# Patient Record
Sex: FEMALE | Race: WHITE | NOT HISPANIC OR LATINO | Employment: OTHER | URBAN - NONMETROPOLITAN AREA
[De-identification: names, ages, dates, MRNs, and addresses within clinical notes are randomized per-mention and may not be internally consistent; named-entity substitution may affect disease eponyms.]

---

## 2017-03-01 ENCOUNTER — OFFICE VISIT (OUTPATIENT)
Dept: ORTHOPEDIC SURGERY | Facility: CLINIC | Age: 67
End: 2017-03-01

## 2017-03-01 VITALS — WEIGHT: 158 LBS | BODY MASS INDEX: 26.33 KG/M2 | RESPIRATION RATE: 18 BRPM | HEIGHT: 65 IN

## 2017-03-01 DIAGNOSIS — M25.561 RIGHT KNEE PAIN, UNSPECIFIED CHRONICITY: Primary | ICD-10-CM

## 2017-03-01 DIAGNOSIS — M17.11 PRIMARY OSTEOARTHRITIS OF RIGHT KNEE: ICD-10-CM

## 2017-03-01 PROCEDURE — 73560 X-RAY EXAM OF KNEE 1 OR 2: CPT | Performed by: ORTHOPAEDIC SURGERY

## 2017-03-01 PROCEDURE — 99202 OFFICE O/P NEW SF 15 MIN: CPT | Performed by: ORTHOPAEDIC SURGERY

## 2017-03-01 RX ORDER — LISINOPRIL 10 MG/1
10 TABLET ORAL DAILY
COMMUNITY
Start: 2017-02-13 | End: 2018-01-22

## 2017-03-01 RX ORDER — HYDROCODONE BITARTRATE AND ACETAMINOPHEN 7.5; 325 MG/1; MG/1
TABLET ORAL
COMMUNITY
Start: 2017-02-16 | End: 2017-07-25

## 2017-03-01 RX ORDER — SIMVASTATIN 40 MG
40 TABLET ORAL NIGHTLY
COMMUNITY
Start: 2017-02-10 | End: 2018-08-01

## 2017-03-01 RX ORDER — CHLORHEXIDINE GLUCONATE 0.12 MG/ML
RINSE ORAL
COMMUNITY
Start: 2017-02-16 | End: 2017-07-25

## 2017-03-01 RX ORDER — TACROLIMUS 1 MG/1
CAPSULE ORAL
Refills: 2 | COMMUNITY
Start: 2017-01-26 | End: 2017-07-25

## 2017-03-01 NOTE — PROGRESS NOTES
Subjective   Patient ID: Sahara Hardwick is a 66 y.o. right hand dominant female is being seen for orthopaedic evaluation today for right knee pain  Pain of the Right Knee    Lower Extremity Issue   This is a chronic problem. The current episode started more than 1 month ago (January, 2017 getting up from a recliner noted increased right knee pain.  It has gradually subsided since that time. No acute injrury or trauma.  ). The problem occurs every several days. The problem has been gradually improving. Associated symptoms include arthralgias. Pertinent negatives include no abdominal pain, chest pain, fever, joint swelling or rash. The symptoms are aggravated by walking and stress. She has tried rest and ice (sitting down, brace, massage, physical therapy) for the symptoms.        Pain Score: 4  Pain Location: Knee  Pain Orientation: Right     Pain Descriptors: Stabbing  Pain Frequency: Several days a week  Pain Onset: Sudden  Date Pain First Started: 01/01/17     Aggravating Factors: Walking, Standing        Pain Intervention(s): Rest, Heat applied, Cold applied, Other (Comment), Massage (brace, physical therapy)  Result of Injury: Yes (got up from recliner)  Work-Related Injury: No    Past Medical History   Diagnosis Date   • Arthritis    • Diverticulitis    • GERD (gastroesophageal reflux disease)    • High cholesterol    • Hypertension         Past Surgical History   Procedure Laterality Date   • Hysterectomy  1991   • Gallbladder surgery  1991   • Hand surgery  2015     right middle finger arthritis nodule removed.       Family History   Problem Relation Age of Onset   • Osteoarthritis Mother    • Heart block Mother    • Cancer Mother      breast   • Heart block Father    • Diabetes Father    • Hyperlipidemia Father    • Cancer Father      colon   • Coronary artery disease Other         Social History     Social History   • Marital status:      Spouse name: N/A   • Number of children: N/A   • Years of  "education: N/A     Occupational History   • retired      Social History Main Topics   • Smoking status: Never Smoker   • Smokeless tobacco: Not on file   • Alcohol use No   • Drug use: No   • Sexual activity: Defer     Other Topics Concern   • Not on file     Social History Narrative       Allergies   Allergen Reactions   • Latex Rash       Review of Systems   Constitutional: Negative for fever.   HENT: Negative for voice change.    Eyes: Negative for visual disturbance.   Respiratory: Negative for shortness of breath.    Cardiovascular: Negative for chest pain.   Gastrointestinal: Negative for abdominal distention and abdominal pain.   Genitourinary: Negative for dysuria.   Musculoskeletal: Positive for arthralgias. Negative for gait problem and joint swelling.   Skin: Negative for rash.   Neurological: Negative for speech difficulty.   Hematological: Does not bruise/bleed easily.   Psychiatric/Behavioral: Negative for confusion.       Objective   Visit Vitals   • Resp 18   • Ht 65\" (165.1 cm)   • Wt 158 lb (71.7 kg)   • BMI 26.29 kg/m2      Physical Exam   Constitutional: She appears well-developed. No distress.   Musculoskeletal: She exhibits deformity (varus right knee).        Right knee: She exhibits no effusion.   Neurological: She is alert.   Skin: Skin is warm and dry. No rash noted. No erythema.   Psychiatric: She has a normal mood and affect. Her speech is normal.   Vitals reviewed.    Right Knee Exam     Tenderness   The patient is experiencing tenderness in the patella and medial retinaculum.    Range of Motion   Extension: 0   Flexion: 110     Muscle Strength     The patient has normal right knee strength.    Tests   Joseph:  Medial - negative   Drawer:       Anterior - 1+    Posterior - 1+  Varus: positive (pseudolaxity)  Valgus: negative  Patellar Apprehension: negative    Other   Erythema: absent  Sensation: normal  Pulse: present  Swelling: mild  Other tests: no effusion present      Back Exam "     Muscle Strength   Right Quadriceps:  5/5   Left Quadriceps:  5/5   Right Hamstrings:  5/5   Left Hamstrings:  5/5         Extremity DVT signs are Negative on physical exam with negative Castillo sign, with no calf pain and with no palpable cords   Neurologic Exam     Mental Status   Attention: normal.   Speech: speech is normal   Level of consciousness: alert  Knowledge: good.     Motor Exam   Overall muscle tone: normal    Strength   Right quadriceps: 5/5  Left quadriceps: 5/5  Right hamstrin/5  Left hamstrin/5    Gait, Coordination, and Reflexes     Gait  Gait: (favors right knee and with right knee varus posture gait.)     Right Knee Exam     Muscle Strength   Normal right knee strength        Assessment/Plan   Independent Review of Radiographic Studies:    Knee images taken at office visit today 3-1-17 with indication to evaluate knee joint condition, and with comparison views (report) available from 17, shows evident chronic advanced varus tri-compartment osteoarthritis.  Laboratory and Other Studies:  No new results reviewed today.   Medical Decision Making:    Ongoing with residual symptoms.  Continue current management and any additional treatments and workup as outlined in plan.    Procedures  [x] No procedures were performed in office today.    Sahara was seen today for pain.    Diagnoses and all orders for this visit:    Right knee pain, unspecified chronicity  -     XR Knee 1 or 2 View Right    Primary osteoarthritis of right knee       Regular exercise as tolerated  Orthopedic activities reviewed and patient expressed appreciation  Discussion of orthopedic goals  Risk, benefits, and merits of treatment alternatives reviewed with the patient and questions answered  Ice, heat, and/or modalities as beneficial    Orthopaedic knee osteoarthritis treatment options reviewed including:  Arthiritis exercises and activity modifications  Knee brace  Medications such as anti-inflammatory, as  tolerated, and if no contraindications  Corticosteroid injection  Visco supplementation injections  Elective knee arthroscopy or arthroplasty as appropriate  Orthopaedic surgery limitations and risks reviewed    Recommendations/Plan:  Exercise, medications, injections, other patient advice, and return appointment as noted.  Brace: No brace was given at today's visit  Referral: No referrals made at today's visit  Test/Studies: No additional studies ordered.  Surgery: No surgery proposed at this visit. and For intractable painful limiting condition, patient to consider elective surgical options.  Work/Activity Status: May perform usual activities as tolerated and No strenuous activity  Patient is encouraged to call or return for any issues or concerns.    Return if symptoms worsen or fail to improve, for as needed.  Patient agreeable to call or return sooner for any concerns.

## 2017-03-21 ENCOUNTER — OFFICE VISIT (OUTPATIENT)
Dept: ORTHOPEDIC SURGERY | Facility: CLINIC | Age: 67
End: 2017-03-21

## 2017-03-21 VITALS — HEIGHT: 65 IN | WEIGHT: 159 LBS | BODY MASS INDEX: 26.49 KG/M2 | RESPIRATION RATE: 16 BRPM

## 2017-03-21 DIAGNOSIS — M17.11 PRIMARY OSTEOARTHRITIS OF RIGHT KNEE: ICD-10-CM

## 2017-03-21 DIAGNOSIS — M25.561 RIGHT KNEE PAIN, UNSPECIFIED CHRONICITY: Primary | ICD-10-CM

## 2017-03-21 PROCEDURE — 20610 DRAIN/INJ JOINT/BURSA W/O US: CPT | Performed by: PHYSICIAN ASSISTANT

## 2017-03-21 PROCEDURE — 99213 OFFICE O/P EST LOW 20 MIN: CPT | Performed by: PHYSICIAN ASSISTANT

## 2017-03-21 RX ORDER — METHYLPREDNISOLONE ACETATE 40 MG/ML
40 INJECTION, SUSPENSION INTRA-ARTICULAR; INTRALESIONAL; INTRAMUSCULAR; SOFT TISSUE
Status: COMPLETED | OUTPATIENT
Start: 2017-03-21 | End: 2017-03-21

## 2017-03-21 RX ORDER — LIDOCAINE HYDROCHLORIDE 10 MG/ML
2 INJECTION, SOLUTION INFILTRATION; PERINEURAL
Status: COMPLETED | OUTPATIENT
Start: 2017-03-21 | End: 2017-03-21

## 2017-03-21 RX ADMIN — LIDOCAINE HYDROCHLORIDE 2 ML: 10 INJECTION, SOLUTION INFILTRATION; PERINEURAL at 11:27

## 2017-03-21 RX ADMIN — METHYLPREDNISOLONE ACETATE 40 MG: 40 INJECTION, SUSPENSION INTRA-ARTICULAR; INTRALESIONAL; INTRAMUSCULAR; SOFT TISSUE at 11:27

## 2017-03-21 NOTE — PROGRESS NOTES
Subjective   Patient ID: Sahara Hardwick is a 66 y.o.  female   Follow-up of the Right Knee         History of Present Illness   Patient presents with right knee pain.  She states she saw Dr. Dewitt proximally 2 weeks ago and was diagnosed with bone-on-bone arthritis to the right knee.  She denies injury or trauma.  She states the pain is interfering with her daily activity.  She requested an intra-articular cortisone injection.  She states she had Dr. Dewitt discussed cortisone injection.  She currently takes Aleve twice per day.  She denies redness or warmth to the knee.     Pain Location: Knee  Pain Orientation: Right     Pain Descriptors: Aching  Pain Frequency: Intermittent           Aggravating Factors: Bending, Walking, Standing           Result of Injury: No  Work-Related Injury: No    Past Medical History   Diagnosis Date   • Arthritis    • Diverticulitis    • GERD (gastroesophageal reflux disease)    • High cholesterol    • Hypertension         Past Surgical History   Procedure Laterality Date   • Hysterectomy  1991   • Gallbladder surgery  1991   • Hand surgery  2015     right middle finger arthritis nodule removed.       Family History   Problem Relation Age of Onset   • Osteoarthritis Mother    • Heart block Mother    • Cancer Mother      breast   • Heart block Father    • Diabetes Father    • Hyperlipidemia Father    • Cancer Father      colon   • Coronary artery disease Other         Social History     Social History   • Marital status:      Spouse name: N/A   • Number of children: N/A   • Years of education: N/A     Occupational History   • retired      Social History Main Topics   • Smoking status: Never Smoker   • Smokeless tobacco: Not on file   • Alcohol use No   • Drug use: No   • Sexual activity: Defer     Other Topics Concern   • Not on file     Social History Narrative       Allergies   Allergen Reactions   • Latex Rash       Review of Systems   Constitutional: Positive for activity  "change. Negative for appetite change, chills, diaphoresis, fatigue, fever and unexpected weight change.   HENT: Negative.    Eyes: Negative.    Respiratory: Negative for apnea, cough, choking, chest tightness, shortness of breath, wheezing and stridor.    Cardiovascular: Negative for chest pain, palpitations and leg swelling.   Gastrointestinal: Negative.    Endocrine: Negative.    Genitourinary: Negative.    Musculoskeletal: Positive for arthralgias. Negative for back pain, gait problem, joint swelling, myalgias, neck pain and neck stiffness.   Skin: Negative.    Allergic/Immunologic: Negative.    Neurological: Negative.    Hematological: Negative.    Psychiatric/Behavioral: Negative.        Objective   Visit Vitals   • Resp 16   • Ht 65\" (165.1 cm)   • Wt 159 lb (72.1 kg)   • BMI 26.46 kg/m2      Physical Exam   Constitutional: She is oriented to person, place, and time. She appears well-developed and well-nourished.   HENT:   Head: Normocephalic.   Neck: No tracheal deviation present.   Pulmonary/Chest: Effort normal.   Musculoskeletal:        Right knee: She exhibits swelling (pes anserine bursa with NO REDNESS). She exhibits no ecchymosis, no deformity, no erythema and normal alignment. Tenderness found. Medial joint line tenderness noted.   Neurological: She is alert and oriented to person, place, and time.   Skin: No rash noted.   Psychiatric: She has a normal mood and affect. Her behavior is normal.   Vitals reviewed.    Right Knee Exam     Range of Motion   Right knee extension: 4.   Flexion: 120     Tests   Drawer:       Anterior - negative        Other   Erythema: absent  Sensation: normal  Pulse: present          Extremity DVT signs are Negative on physical exam with negative Castillo sign, with no calf pain, with no palpable cords, with no increased pain with passive stretch/extension and with no skin tone change   Neurologic Exam     Mental Status   Oriented to person, place, and time.      Right Knee " Exam     Tenderness   The patient is experiencing tenderness in the medial joint line.            Assessment/Plan   Independent Review of Radiographic Studies:    No new imaging done today.   Xray of bilateral knee from recent 3/2017 was reviewed.  There is significant anterior medial joint space and bone on bone interaction with osteophytes and spurring to the right lateral femoral condyle.  Laboratory and Other Studies:  No new results reviewed today.     Large Joint Arthrocentesis  Date/Time: 3/21/2017 11:27 AM  Consent given by: patient  Site marked: site marked  Timeout: Immediately prior to procedure a time out was called to verify the correct patient, procedure, equipment, support staff and site/side marked as required   Supporting Documentation  Indications: pain   Procedure Details  Location: knee - R knee  Preparation: Patient was prepped and draped in the usual sterile fashion  Needle size: 22 G  Approach: anterolateral  Medications administered: 40 mg methylPREDNISolone acetate 40 MG/ML; 2 mL lidocaine 1 %  Patient tolerance: patient tolerated the procedure well with no immediate complications            Sahara was seen today for follow-up.    Diagnoses and all orders for this visit:    Right knee pain, unspecified chronicity  -     Large Joint Arthrocentesis    Primary osteoarthritis of right knee     Orthopedic activities reviewed and patient expressed appreciation  Discussion of orthopedic goals  Risk, benefits, and merits of treatment alternatives reviewed with the patient and questions answered  Call or notify for any adverse effect from injection therapy  Ice, heat, and/or modalities as beneficial    Recommendations/Plan:  Exercise, medications, injections, other patient advice, and return appointment as noted.  Patient is encouraged to call or return for any issues or concerns.    Fu in 3 months or as needed    Patient agreeable to call or return sooner for any concerns.

## 2017-06-28 ENCOUNTER — OFFICE VISIT (OUTPATIENT)
Dept: ORTHOPEDIC SURGERY | Facility: CLINIC | Age: 67
End: 2017-06-28

## 2017-06-28 VITALS — RESPIRATION RATE: 18 BRPM | HEIGHT: 65 IN | BODY MASS INDEX: 26.49 KG/M2 | WEIGHT: 159 LBS

## 2017-06-28 DIAGNOSIS — Z01.818 PRE-OP TESTING: ICD-10-CM

## 2017-06-28 DIAGNOSIS — G89.29 CHRONIC PAIN OF RIGHT KNEE: ICD-10-CM

## 2017-06-28 DIAGNOSIS — E83.19 OTHER DISORDERS OF IRON METABOLISM: ICD-10-CM

## 2017-06-28 DIAGNOSIS — M17.11 PRIMARY OSTEOARTHRITIS OF RIGHT KNEE: Primary | ICD-10-CM

## 2017-06-28 DIAGNOSIS — M25.561 CHRONIC PAIN OF RIGHT KNEE: ICD-10-CM

## 2017-06-28 DIAGNOSIS — Z01.818 PRE-OP EVALUATION: ICD-10-CM

## 2017-06-28 PROCEDURE — 99213 OFFICE O/P EST LOW 20 MIN: CPT | Performed by: ORTHOPAEDIC SURGERY

## 2017-06-29 RX ORDER — SODIUM CHLORIDE 0.9 % (FLUSH) 0.9 %
1-10 SYRINGE (ML) INJECTION AS NEEDED
Status: CANCELLED | OUTPATIENT
Start: 2017-06-29

## 2017-06-29 NOTE — PROGRESS NOTES
Sahara Hardwick is a 66 y.o. right hand dominant female is here today for a discussion of treatment plans, surgery, and rehabilitation.  Follow-up and Pain of the Right Knee       History of Present Illness    Lower Extremity Issue   This is a chronic problem. The current episode started more than 1 month ago (January, 2017 getting up from a recliner noted increased right knee pain. It has gradually subsided since that time. No acute injrury or trauma. ). The problem occurs every several days. The problem has been gradually improving. Associated symptoms include arthralgias. Pertinent negatives include no abdominal pain, chest pain, fever, joint swelling or rash. The symptoms are aggravated by walking and stress. She has tried rest and ice (sitting down, brace, massage, physical therapy) for the symptoms.     More recently she has also tried pain medication and knee injection therapy with only limited short term relief.    PAST MEDICAL HISTORY:    Past Medical History:   Diagnosis Date   • Arthritis    • Diverticulitis    • GERD (gastroesophageal reflux disease)    • High cholesterol    • Hypertension          Current Outpatient Prescriptions:   •  chlorhexidine (PERIDEX) 0.12 % solution, , Disp: , Rfl:   •  HYDROcodone-acetaminophen (NORCO) 7.5-325 MG per tablet, , Disp: , Rfl:   •  lisinopril (PRINIVIL,ZESTRIL) 10 MG tablet, , Disp: , Rfl:   •  simvastatin (ZOCOR) 40 MG tablet, , Disp: , Rfl:   •  tacrolimus (PROGRAF) 1 MG capsule, dissolve 1 capsule in 1000ml of purified water. rinse with 2 teaspoons and hold for 2 minutes, then spit. Repeat 4 times daily. Keep refrige, Disp: , Rfl: 2    Past Surgical History:   Procedure Laterality Date   • GALLBLADDER SURGERY  1991   • HAND SURGERY  2015    right middle finger arthritis nodule removed.   • HYSTERECTOMY  1991       Family History   Problem Relation Age of Onset   • Osteoarthritis Mother    • Heart block Mother    • Cancer Mother      breast   • Heart block Father   "  • Diabetes Father    • Hyperlipidemia Father    • Cancer Father      colon   • Coronary artery disease Other        Social History     Social History   • Marital status:      Spouse name: N/A   • Number of children: N/A   • Years of education: N/A     Occupational History   • retired      Social History Main Topics   • Smoking status: Never Smoker   • Smokeless tobacco: Not on file   • Alcohol use No   • Drug use: No   • Sexual activity: Defer     Other Topics Concern   • Not on file     Social History Narrative        Allergies   Allergen Reactions   • Latex Rash       Review of Systems   Constitutional: Negative for fever.   HENT: Negative for voice change.    Eyes: Negative for visual disturbance.   Respiratory: Negative for shortness of breath.    Cardiovascular: Negative for chest pain.   Gastrointestinal: Negative for abdominal distention and abdominal pain.   Genitourinary: Negative for dysuria.   Musculoskeletal: Positive for arthralgias. Negative for gait problem and joint swelling.   Skin: Negative for rash.   Neurological: Negative for speech difficulty.   Hematological: Does not bruise/bleed easily.   Psychiatric/Behavioral: Negative for confusion.       PHYSICAL EXAMINATION:       Resp 18  Ht 65\" (165.1 cm)  Wt 159 lb (72.1 kg)  BMI 26.46 kg/m2    GENERAL [x] Well developed  []Ill appearing [x] No acute distress    HEENT [x]No acute changes [x] Normocephalic, atraumatic    NECK [x]Supple  [] No midline tenderness    LUNGS [x]Clear bilaterally [x]No wheezes []Rhonchi [] Rales    HEART [x] Regular rate  [x] Regular rhythm [] Irregular    ABDOMEN [x] Soft [x] Not tender [x] Not distended [x] Normal sounds    VAS/EXT [x] Normal Pulses []Edema []Cyanosis             SKIN [x] Warm [x]Dry []Pink []Ecchymosis []Cool    NEURO [x] Sensation Intact [x] Motor Intact [x] Pulse intact  [] Dysesthesias:_________  []Weakness:__________    MUSCULOSKELETAL []          Physical Exam   Constitutional: She " appears well-developed. No distress.   HENT:   Head: Normocephalic and atraumatic.   Eyes: EOM are normal. Pupils are equal, round, and reactive to light.   Neck: Neck supple. No tracheal deviation present.   Cardiovascular: Normal rate and regular rhythm.    Pulmonary/Chest: Breath sounds normal. No respiratory distress. She has no wheezes.   Abdominal: Soft. Bowel sounds are normal. She exhibits no distension. There is no tenderness.   Musculoskeletal: She exhibits deformity (varus right knee).   Neurological: She is alert.   Skin: Skin is warm and dry. No rash noted. No erythema.   Psychiatric: She has a normal mood and affect. Her speech is normal.   Vitals reviewed.    Right Knee Exam     Tenderness   The patient is experiencing tenderness in the medial retinaculum, lateral retinaculum, medial joint line and patella.    Range of Motion   Right knee extension: 7 degrees.   Right knee flexion: 105 degrees.     Muscle Strength     The patient has normal right knee strength.    Tests   Joseph:  Medial - negative   Drawer:       Anterior - 1+    Posterior - 1+  Varus: positive  Valgus: negative  Patellar Apprehension: negative    Other   Erythema: absent  Pulse: present  Swelling: mild      Back Exam     Muscle Strength   Right Quadriceps:  5/5   Left Quadriceps:  5/5   Right Hamstrings:  5/5   Left Hamstrings:  5/5         Extremity DVT signs are Negative on physical exam with negative Castillo sign, with no calf pain and with no palpable cords   Neurologic Exam     Mental Status   Attention: normal.   Speech: speech is normal   Level of consciousness: alert  Knowledge: good.     Cranial Nerves     CN III, IV, VI   Pupils are equal, round, and reactive to light.  Extraocular motions are normal.     Motor Exam   Overall muscle tone: normal    Strength   Right quadriceps: 5/5  Left quadriceps: 5/5  Right hamstrin/5  Left hamstrin/5    Gait, Coordination, and Reflexes     Gait  Gait: (varus posture right knee  limp and mild extension lag.)    Right Knee Exam     Muscle Strength   Normal right knee strength            DVT Screening: No Yes   Smoker [x] []   Personal/Family History of DVT or PE [x] []   Sedentary Lifestyle [x] []   Overweight [x] []        Previous or Active DVT/PE: NO  Cardiac Stent within 1 year: NO  Embolic/Ischemic stroke within 1 year: NO  Creatinine less than 30ml/min: NO  Congenital Thrombophilia: NO  Hypersensitivity to TXA: NO  Color Blindness: NO  NOTE:  TXA  tranexemic acid summary: THIS PATIENT IS A CANDIDATE FOR IV TXA DURING SURGERY.    Independent Review of Radiographic Studies:    No new imaging done today.  Reviewed at a prior visit.  Laboratory and Other Studies:  No new results reviewed today.   Medical Decision Making:    Limited progress with persistent and/or progressive symptoms.  Continue current management and any additional treatments and workup as outlined in plan.        *SPECIAL INSTRUCTIONS:  Tranexamic Acid IV Protocol.  Multi-modal Analgesia, Multi-modal DVT prophylaxis.      Risks, benefits, and alternative treatments discussed with the patient: [x] Yes [] No    Risk benefits and merits of the proposed surgery were discussed and the patient's questions were answered risks discussed including and not limited to:  Anesthesia reactions  Blood loss and possible transfusion  Infection  Deep venous thrombosis and pulmonary embolus  Nerve, vascular or tendon injury  Fracture  Deformity  Stiffness  Weakness  Prosthesis and implant issues such as loosening, material wear or dislocation  Skin necrosis  Revision surgery or further treatment  Recurrence of problem and condition     Informed consent: [] Signed  [x] To be obtained at hospital  [] Both    Sahara was seen today for follow-up and pain.    Diagnoses and all orders for this visit:    Primary osteoarthritis of right knee  -     Inpatient Admission; Standing  -     Case Request; Standing  -     Consult Primary Care Physician  for Medical Clearance  -     Provide instructions to patient regarding NPO status  -     Obtain informed consent  -     Clorhexidine skin prep  -     CBC and Differential  -     Comprehensive metabolic panel  -     Protime-INR  -     APTT  -     MRSA screen  -     Urinalysis w/Culture if Indicated  -     ECG 12 Lead  -     XR chest 2 vw  -     Follow anesthesia standing orders.; Standing  -     Verify NPO Status; Standing  -     TRAVIS hose- To be placed on patient in pre-op; Standing  -     SCD (sequential compression device)- to be placed on patient in Pre-op; Standing  -     POC Glucose Fingerstick; Standing  -     Notify physician (specify); Standing  -     Insert Peripheral IV; Standing  -     Saline Lock & Maintain IV Access; Standing  -     sodium chloride 0.9 % flush 1-10 mL; Infuse 1-10 mL into a venous catheter As Needed for Line Care.  -     ceFAZolin (ANCEF) 2 g in sodium chloride 0.9 % 100 mL IVPB; Infuse 2 g into a venous catheter 1 (One) Time.  -     Tranexamic Acid 721 mg in sodium chloride 0.9 % 250 mL; Infuse 721 mg into a venous catheter 1 (One) Time.  -     Tranexamic Acid 721 mg in sodium chloride 0.9 % 250 mL; Infuse 721 mg into a venous catheter 1 (One) Time.  -     famotidine (PEPCID) injection 20 mg; Infuse 2 mL into a venous catheter On Call to the Operating Room.  -     Case Request    Chronic pain of right knee  -     Inpatient Admission; Standing  -     Case Request; Standing  -     Consult Primary Care Physician for Medical Clearance  -     Provide instructions to patient regarding NPO status  -     Obtain informed consent  -     Clorhexidine skin prep  -     CBC and Differential  -     Comprehensive metabolic panel  -     Protime-INR  -     APTT  -     MRSA screen  -     Urinalysis w/Culture if Indicated  -     ECG 12 Lead  -     XR chest 2 vw  -     Follow anesthesia standing orders.; Standing  -     Verify NPO Status; Standing  -     TRAVIS hose- To be placed on patient in pre-op;  Standing  -     SCD (sequential compression device)- to be placed on patient in Pre-op; Standing  -     POC Glucose Fingerstick; Standing  -     Notify physician (specify); Standing  -     Insert Peripheral IV; Standing  -     Saline Lock & Maintain IV Access; Standing  -     sodium chloride 0.9 % flush 1-10 mL; Infuse 1-10 mL into a venous catheter As Needed for Line Care.  -     ceFAZolin (ANCEF) 2 g in sodium chloride 0.9 % 100 mL IVPB; Infuse 2 g into a venous catheter 1 (One) Time.  -     Tranexamic Acid 721 mg in sodium chloride 0.9 % 250 mL; Infuse 721 mg into a venous catheter 1 (One) Time.  -     Tranexamic Acid 721 mg in sodium chloride 0.9 % 250 mL; Infuse 721 mg into a venous catheter 1 (One) Time.  -     famotidine (PEPCID) injection 20 mg; Infuse 2 mL into a venous catheter On Call to the Operating Room.  -     Case Request    Pre-op testing  -     Inpatient Admission; Standing  -     Case Request; Standing  -     Consult Primary Care Physician for Medical Clearance  -     Provide instructions to patient regarding NPO status  -     Obtain informed consent  -     Clorhexidine skin prep  -     CBC and Differential  -     Comprehensive metabolic panel  -     Protime-INR  -     APTT  -     MRSA screen  -     Urinalysis w/Culture if Indicated  -     ECG 12 Lead  -     XR chest 2 vw  -     Follow anesthesia standing orders.; Standing  -     Verify NPO Status; Standing  -     TRAVIS hose- To be placed on patient in pre-op; Standing  -     SCD (sequential compression device)- to be placed on patient in Pre-op; Standing  -     POC Glucose Fingerstick; Standing  -     Notify physician (specify); Standing  -     Insert Peripheral IV; Standing  -     Saline Lock & Maintain IV Access; Standing  -     sodium chloride 0.9 % flush 1-10 mL; Infuse 1-10 mL into a venous catheter As Needed for Line Care.  -     ceFAZolin (ANCEF) 2 g in sodium chloride 0.9 % 100 mL IVPB; Infuse 2 g into a venous catheter 1 (One) Time.  -      Tranexamic Acid 721 mg in sodium chloride 0.9 % 250 mL; Infuse 721 mg into a venous catheter 1 (One) Time.  -     Tranexamic Acid 721 mg in sodium chloride 0.9 % 250 mL; Infuse 721 mg into a venous catheter 1 (One) Time.  -     famotidine (PEPCID) injection 20 mg; Infuse 2 mL into a venous catheter On Call to the Operating Room.  -     Case Request    Pre-op evaluation  -     Inpatient Admission; Standing  -     Case Request; Standing  -     Consult Primary Care Physician for Medical Clearance  -     Provide instructions to patient regarding NPO status  -     Obtain informed consent  -     Clorhexidine skin prep  -     CBC and Differential  -     Comprehensive metabolic panel  -     Protime-INR  -     APTT  -     MRSA screen  -     Urinalysis w/Culture if Indicated  -     ECG 12 Lead  -     XR chest 2 vw  -     Follow anesthesia standing orders.; Standing  -     Verify NPO Status; Standing  -     TRAVIS hose- To be placed on patient in pre-op; Standing  -     SCD (sequential compression device)- to be placed on patient in Pre-op; Standing  -     POC Glucose Fingerstick; Standing  -     Notify physician (specify); Standing  -     Insert Peripheral IV; Standing  -     Saline Lock & Maintain IV Access; Standing  -     sodium chloride 0.9 % flush 1-10 mL; Infuse 1-10 mL into a venous catheter As Needed for Line Care.  -     ceFAZolin (ANCEF) 2 g in sodium chloride 0.9 % 100 mL IVPB; Infuse 2 g into a venous catheter 1 (One) Time.  -     Tranexamic Acid 721 mg in sodium chloride 0.9 % 250 mL; Infuse 721 mg into a venous catheter 1 (One) Time.  -     Tranexamic Acid 721 mg in sodium chloride 0.9 % 250 mL; Infuse 721 mg into a venous catheter 1 (One) Time.  -     famotidine (PEPCID) injection 20 mg; Infuse 2 mL into a venous catheter On Call to the Operating Room.  -     Case Request    Other disorders of iron metabolism   -     Inpatient Admission; Standing  -     Case Request; Standing  -     Consult Primary Care Physician  for Medical Clearance  -     Provide instructions to patient regarding NPO status  -     Obtain informed consent  -     Clorhexidine skin prep  -     CBC and Differential  -     Comprehensive metabolic panel  -     Protime-INR  -     APTT  -     MRSA screen  -     Urinalysis w/Culture if Indicated  -     ECG 12 Lead  -     XR chest 2 vw  -     Follow anesthesia standing orders.; Standing  -     Verify NPO Status; Standing  -     TRAVIS hose- To be placed on patient in pre-op; Standing  -     SCD (sequential compression device)- to be placed on patient in Pre-op; Standing  -     POC Glucose Fingerstick; Standing  -     Notify physician (specify); Standing  -     Insert Peripheral IV; Standing  -     Saline Lock & Maintain IV Access; Standing  -     sodium chloride 0.9 % flush 1-10 mL; Infuse 1-10 mL into a venous catheter As Needed for Line Care.  -     ceFAZolin (ANCEF) 2 g in sodium chloride 0.9 % 100 mL IVPB; Infuse 2 g into a venous catheter 1 (One) Time.  -     Tranexamic Acid 721 mg in sodium chloride 0.9 % 250 mL; Infuse 721 mg into a venous catheter 1 (One) Time.  -     Tranexamic Acid 721 mg in sodium chloride 0.9 % 250 mL; Infuse 721 mg into a venous catheter 1 (One) Time.  -     famotidine (PEPCID) injection 20 mg; Infuse 2 mL into a venous catheter On Call to the Operating Room.  -     Case Request      Recommendations/Plan:    Regular exercise as tolerated  Orthopedic activities reviewed and patient expressed appreciation  Discussion of orthopedic goals  Risk, benefits, and merits of treatment alternatives reviewed with the patient and questions answered  The nature of the proposed surgery reviewed with the patient including risks, benefits, rehabilitation, recovery timeframe, and outcome expectations  Take prescribed medications as instructed only as tolerated  Reduced physical activity as appropriate  Ice, heat, and/or modalities as beneficial  After care and dental prophylaxis for joint replacement  prosthesis  Watch for signs and symptoms of infection    Exercise, medications, injections, other patient advice, and return appointment as noted.  Brace: No brace was given at today's visit  Referral: No referrals made at today's visit  Test/Studies: No additional studies ordered.  Surgery: Surgery proposed at this visit as noted.  Work/Activity Status: May perform usual activities as tolerated and No strenuous activity.  Patient is encouraged to call or return for any issues or concerns.    PLANNED SURGICAL PROCEDURE: Elective right total knee replacement.    Return in about 6 weeks (around 8/9/2017) for Post-op, Recheck.

## 2017-07-25 ENCOUNTER — APPOINTMENT (OUTPATIENT)
Dept: PREADMISSION TESTING | Facility: HOSPITAL | Age: 67
End: 2017-07-25

## 2017-07-25 ENCOUNTER — OFFICE VISIT (OUTPATIENT)
Dept: ORTHOPEDIC SURGERY | Facility: CLINIC | Age: 67
End: 2017-07-25

## 2017-07-25 ENCOUNTER — HOSPITAL ENCOUNTER (OUTPATIENT)
Dept: GENERAL RADIOLOGY | Facility: HOSPITAL | Age: 67
Discharge: HOME | End: 2017-07-25
Admitting: ORTHOPAEDIC SURGERY

## 2017-07-25 VITALS — BODY MASS INDEX: 26.49 KG/M2 | HEIGHT: 65 IN | RESPIRATION RATE: 18 BRPM | WEIGHT: 159 LBS

## 2017-07-25 VITALS
WEIGHT: 157 LBS | DIASTOLIC BLOOD PRESSURE: 88 MMHG | HEIGHT: 65 IN | BODY MASS INDEX: 26.16 KG/M2 | HEART RATE: 72 BPM | SYSTOLIC BLOOD PRESSURE: 176 MMHG

## 2017-07-25 DIAGNOSIS — M17.11 PRIMARY OSTEOARTHRITIS OF RIGHT KNEE: Primary | ICD-10-CM

## 2017-07-25 DIAGNOSIS — G89.29 CHRONIC PAIN OF RIGHT KNEE: ICD-10-CM

## 2017-07-25 DIAGNOSIS — M25.561 CHRONIC PAIN OF RIGHT KNEE: ICD-10-CM

## 2017-07-25 LAB
ALBUMIN SERPL-MCNC: 4.8 G/DL (ref 3.5–5)
ALBUMIN/GLOB SERPL: 1.8 G/DL (ref 1–2)
ALP SERPL-CCNC: 66 U/L (ref 38–126)
ALT SERPL W P-5'-P-CCNC: 37 U/L (ref 13–69)
ANION GAP SERPL CALCULATED.3IONS-SCNC: 16.8 MMOL/L
APTT PPP: 27.7 SECONDS (ref 25–36)
AST SERPL-CCNC: 27 U/L (ref 15–46)
BASOPHILS # BLD AUTO: 0.05 10*3/MM3 (ref 0–0.2)
BASOPHILS NFR BLD AUTO: 0.6 % (ref 0–2.5)
BILIRUB SERPL-MCNC: 0.6 MG/DL (ref 0.2–1.3)
BILIRUB UR QL STRIP: NEGATIVE
BUN BLD-MCNC: 19 MG/DL (ref 7–20)
BUN/CREAT SERPL: 27.1 (ref 7.1–23.5)
CALCIUM SPEC-SCNC: 9.9 MG/DL (ref 8.4–10.2)
CHLORIDE SERPL-SCNC: 106 MMOL/L (ref 98–107)
CLARITY UR: CLEAR
CO2 SERPL-SCNC: 27 MMOL/L (ref 26–30)
COLOR UR: YELLOW
CREAT BLD-MCNC: 0.7 MG/DL (ref 0.6–1.3)
DEPRECATED RDW RBC AUTO: 46.4 FL (ref 37–54)
EOSINOPHIL # BLD AUTO: 0.06 10*3/MM3 (ref 0–0.7)
EOSINOPHIL NFR BLD AUTO: 0.7 % (ref 0–7)
ERYTHROCYTE [DISTWIDTH] IN BLOOD BY AUTOMATED COUNT: 13.7 % (ref 11.5–14.5)
GFR SERPL CREATININE-BSD FRML MDRD: 84 ML/MIN/1.73
GLOBULIN UR ELPH-MCNC: 2.6 GM/DL
GLUCOSE BLD-MCNC: 104 MG/DL (ref 74–98)
GLUCOSE UR STRIP-MCNC: NEGATIVE MG/DL
HCT VFR BLD AUTO: 46.9 % (ref 37–47)
HGB BLD-MCNC: 14.4 G/DL (ref 12–16)
HGB UR QL STRIP.AUTO: NEGATIVE
IMM GRANULOCYTES # BLD: 0.02 10*3/MM3 (ref 0–0.06)
IMM GRANULOCYTES NFR BLD: 0.2 % (ref 0–0.6)
INR PPP: 1.1 (ref 0.9–1.1)
KETONES UR QL STRIP: NEGATIVE
LEUKOCYTE ESTERASE UR QL STRIP.AUTO: NEGATIVE
LYMPHOCYTES # BLD AUTO: 2.49 10*3/MM3 (ref 0.6–3.4)
LYMPHOCYTES NFR BLD AUTO: 29.2 % (ref 10–50)
MCH RBC QN AUTO: 28 PG (ref 27–31)
MCHC RBC AUTO-ENTMCNC: 30.7 G/DL (ref 30–37)
MCV RBC AUTO: 91.1 FL (ref 81–99)
MONOCYTES # BLD AUTO: 0.73 10*3/MM3 (ref 0–0.9)
MONOCYTES NFR BLD AUTO: 8.6 % (ref 0–12)
NEUTROPHILS # BLD AUTO: 5.18 10*3/MM3 (ref 2–6.9)
NEUTROPHILS NFR BLD AUTO: 60.7 % (ref 37–80)
NITRITE UR QL STRIP: NEGATIVE
NRBC BLD MANUAL-RTO: 0 /100 WBC (ref 0–0)
PH UR STRIP.AUTO: 6 [PH] (ref 5–8)
PLATELET # BLD AUTO: 361 10*3/MM3 (ref 130–400)
PMV BLD AUTO: 9.2 FL (ref 6–12)
POTASSIUM BLD-SCNC: 4.8 MMOL/L (ref 3.5–5.1)
PROT SERPL-MCNC: 7.4 G/DL (ref 6.3–8.2)
PROT UR QL STRIP: NEGATIVE
PROTHROMBIN TIME: 12.1 SECONDS (ref 9.3–12.1)
RBC # BLD AUTO: 5.15 10*6/MM3 (ref 4.2–5.4)
SODIUM BLD-SCNC: 145 MMOL/L (ref 137–145)
SP GR UR STRIP: 1.01 (ref 1–1.03)
UROBILINOGEN UR QL STRIP: NORMAL
WBC NRBC COR # BLD: 8.53 10*3/MM3 (ref 4.8–10.8)

## 2017-07-25 PROCEDURE — 81003 URINALYSIS AUTO W/O SCOPE: CPT | Performed by: ORTHOPAEDIC SURGERY

## 2017-07-25 PROCEDURE — 80053 COMPREHEN METABOLIC PANEL: CPT | Performed by: ORTHOPAEDIC SURGERY

## 2017-07-25 PROCEDURE — S0260 H&P FOR SURGERY: HCPCS | Performed by: PHYSICIAN ASSISTANT

## 2017-07-25 PROCEDURE — 36415 COLL VENOUS BLD VENIPUNCTURE: CPT | Performed by: ORTHOPAEDIC SURGERY

## 2017-07-25 PROCEDURE — 85730 THROMBOPLASTIN TIME PARTIAL: CPT | Performed by: ORTHOPAEDIC SURGERY

## 2017-07-25 PROCEDURE — 85610 PROTHROMBIN TIME: CPT | Performed by: ORTHOPAEDIC SURGERY

## 2017-07-25 PROCEDURE — 71020 HC CHEST PA AND LATERAL: CPT

## 2017-07-25 PROCEDURE — 87081 CULTURE SCREEN ONLY: CPT | Performed by: ORTHOPAEDIC SURGERY

## 2017-07-25 PROCEDURE — 85025 COMPLETE CBC W/AUTO DIFF WBC: CPT | Performed by: ORTHOPAEDIC SURGERY

## 2017-07-25 RX ORDER — OMEPRAZOLE 20 MG/1
20 CAPSULE, DELAYED RELEASE ORAL DAILY
COMMUNITY
End: 2018-08-01

## 2017-07-25 RX ORDER — MELATONIN
1000 DAILY
COMMUNITY
End: 2019-01-07

## 2017-07-25 RX ORDER — ASPIRIN 81 MG/1
81 TABLET ORAL DAILY
COMMUNITY
End: 2017-08-03 | Stop reason: HOSPADM

## 2017-07-25 RX ORDER — TACROLIMUS 1 MG/1
1 CAPSULE ORAL TAKE AS DIRECTED
COMMUNITY

## 2017-07-25 RX ORDER — NAPROXEN SODIUM 220 MG
220 TABLET ORAL DAILY
COMMUNITY
End: 2017-08-03 | Stop reason: HOSPADM

## 2017-07-25 RX ORDER — CALCIUM CARBONATE 200(500)MG
1 TABLET,CHEWABLE ORAL DAILY
COMMUNITY
End: 2018-08-01

## 2017-07-25 NOTE — PROGRESS NOTES
Sahara Hardwick is a 66 y.o. right hand dominant female   is here today for a discussion of treatment plans, surgery, and rehabilitation.  Follow-up and Pain of the Right Knee       History of Present Illness      This is a chronic condition.  Patient presents for continued chronic right knee pain.  She states she has tried ice, rest, elevation, anti-inflammatories, cortisone injections all with no relief.  She had discussed with Dr. Dewitt at her last office visit the option for right knee replacement.  Patient states she is willing to proceed with the knee replacement.  She denies chest pain, shortness of air, fever or chills.    PAST MEDICAL HISTORY:    Past Medical History:   Diagnosis Date   • Arthritis    • Diverticulitis    • GERD (gastroesophageal reflux disease)    • High cholesterol    • Hypertension          Current Outpatient Prescriptions:   •  aspirin 81 MG EC tablet, Take 81 mg by mouth Daily., Disp: , Rfl:   •  calcium carbonate (TUMS) 500 MG chewable tablet, Chew 1 tablet Daily., Disp: , Rfl:   •  cholecalciferol (VITAMIN D3) 1000 UNITS tablet, Take 1,000 Units by mouth Daily., Disp: , Rfl:   •  cimetidine (TAGAMET) 200 MG tablet, Take 200 mg by mouth Daily As Needed., Disp: , Rfl:   •  lisinopril (PRINIVIL,ZESTRIL) 10 MG tablet, Take 10 mg by mouth Daily., Disp: , Rfl:   •  Multiple Vitamins-Minerals (MULTIVITAMIN ADULT PO), Take 1 tablet by mouth Daily., Disp: , Rfl:   •  naproxen sodium (ALEVE) 220 MG tablet, Take 220 mg by mouth Daily., Disp: , Rfl:   •  Omega-3 Fatty Acids (FISH OIL) 1200 MG capsule delayed-release, Take 1 capsule by mouth Daily., Disp: , Rfl:   •  omeprazole (priLOSEC) 20 MG capsule, Take 20 mg by mouth Daily., Disp: , Rfl:   •  simvastatin (ZOCOR) 40 MG tablet, Take 40 mg by mouth Every Night., Disp: , Rfl:   •  tacrolimus (PROGRAF) 1 MG capsule, Take 1 mg by mouth 4 (Four) Times a Day., Disp: , Rfl:     Past Surgical History:   Procedure Laterality Date   • GALLBLADDER SURGERY  " 1991   • HAND SURGERY  2015    right middle finger arthritis nodule removed.   • HYSTERECTOMY  1991       Family History   Problem Relation Age of Onset   • Osteoarthritis Mother    • Heart block Mother    • Cancer Mother      breast   • Heart block Father    • Diabetes Father    • Hyperlipidemia Father    • Cancer Father      colon   • Coronary artery disease Other        Social History     Social History   • Marital status:      Spouse name: N/A   • Number of children: N/A   • Years of education: N/A     Occupational History   • retired      Social History Main Topics   • Smoking status: Never Smoker   • Smokeless tobacco: Never Used   • Alcohol use No   • Drug use: No   • Sexual activity: Defer     Other Topics Concern   • Not on file     Social History Narrative        Allergies   Allergen Reactions   • Milk-Related Compounds GI Intolerance   • Latex Rash     Tape and adhesive bandages cause rash        Review of Systems   Constitutional: Negative for fever.   HENT: Negative for voice change.    Eyes: Negative for visual disturbance.   Respiratory: Negative for shortness of breath.    Cardiovascular: Negative for chest pain.   Gastrointestinal: Negative for abdominal distention and abdominal pain.   Genitourinary: Negative for dysuria.   Musculoskeletal: Positive for arthralgias. Negative for gait problem and joint swelling.   Skin: Negative for rash.   Neurological: Negative for speech difficulty.   Hematological: Does not bruise/bleed easily.   Psychiatric/Behavioral: Negative for confusion.       PHYSICAL EXAMINATION:       Resp 18  Ht 65\" (165.1 cm)  Wt 159 lb (72.1 kg)  BMI 26.46 kg/m2    GENERAL [x] Well developed  []Ill appearing [x] No acute distress    HEENT [x]No acute changes [x] Normocephalic, atraumatic    NECK [x]Supple  [] No midline tenderness    LUNGS [x]Clear bilaterally [x]No wheezes []Rhonchi [] Rales    HEART [x] Regular rate  [x] Regular rhythm [] Irregular    ABDOMEN [x] Soft " [x] Not tender [x] Not distended [x] Normal sounds    VAS/EXT [x] Normal Pulses []Edema []Cyanosis             SKIN [x] Warm [x]Dry []Pink []Ecchymosis []Cool    NEURO [x] Sensation Intact [x] Motor Intact [x] Pulse intact  [] Dysesthesias:_________  []Weakness:__________             Physical Exam   Constitutional: She is oriented to person, place, and time.   HENT:   Head: Normocephalic.   Neck: No tracheal deviation present.   Cardiovascular: Regular rhythm.    Pulmonary/Chest: Effort normal.   Abdominal: Soft. There is no tenderness.   Musculoskeletal:        Right knee: She exhibits decreased range of motion. She exhibits no swelling, no effusion, no ecchymosis and no erythema. Tenderness found. Medial joint line and lateral joint line tenderness noted.   Neurological: She is alert and oriented to person, place, and time.   Skin: No rash noted.   Psychiatric: She has a normal mood and affect. Her behavior is normal.   Vitals reviewed.    Right Knee Exam     Range of Motion   Right knee extension: 5.   Right knee flexion: 102.     Other   Erythema: absent  Sensation: normal  Pulse: present  Other tests: no effusion present          Extremity DVT signs are Negative on physical exam with negative Castillo sign, with no calf pain, with no palpable cords, with no increased pain with passive stretch/extension and with no skin tone change   Neurologic Exam     Mental Status   Oriented to person, place, and time.     Right Knee Exam     Tenderness   The patient is experiencing tenderness in the medial joint line, lateral joint line.            DVT Screening: No Yes   Smoker [x] []   Personal/Family History of DVT or PE [x] []   Sedentary Lifestyle [x] []   Overweight [x] []          Previous or Active DVT/PE: NO  Cardiac Stent within 1 year: NO  Embolic/Ischemic stroke within 1 year: NO  Creatinine less than 30ml/min: NO  Congenital Thrombophilia: NO  Hypersensitivity to TXA: NO  Color Blindness: NO  NOTE:  TXA  tranexemic  acid summary: THIS PATIENT IS A CANDIDATE FOR IV TXA DURING SURGERY.    Independent Review of Radiographic Studies:      Prior x-ray of the right knee reviewed.  There is significant anterior medial joint space narrowing.  Significant patellofemoral degenerative changes.    Risks, benefits, and alternative treatments discussed with the patient: [x] Yes [] No    Risk benefits and merits of the proposed surgery were discussed and the patient's questions were answered risks discussed including and not limited to:  Anesthesia reactions  Blood loss and possible transfusion  Infection  Deep venous thrombosis and pulmonary embolus  Nerve, vascular or tendon injury  Fracture  Deformity  Stiffness  Weakness  Prosthesis and implant issues such as loosening, material wear or dislocation  Skin necrosis  Revision surgery or further treatment  Recurrence of problem and condition     Informed consent: [] Signed  [x] To be obtained at hospital  [] Santa Shoemaker was seen today for follow-up and pain.    Diagnoses and all orders for this visit:    Primary osteoarthritis of right knee    Chronic pain of right knee       Recommendations/Plan:    Orthopedic activities reviewed and patient expressed appreciation  Discussion of orthopedic goals  Risk, benefits, and merits of treatment alternatives reviewed with the patient and questions answered  The nature of the proposed surgery reviewed with the patient including risks, benefits, rehabilitation, recovery timeframe, and outcome expectations    Exercise, medications, injections, other patient advice, and return appointment as noted.  Surgery: Surgery proposed at this visit as noted.  Patient is encouraged to call or return for any issues or concerns.    PLANNED SURGICAL PROCEDURE: Elective Right total knee replacement

## 2017-07-27 LAB — MRSA SPEC QL CULT: NORMAL

## 2017-08-01 ENCOUNTER — ANESTHESIA EVENT (OUTPATIENT)
Dept: PERIOP | Facility: HOSPITAL | Age: 67
End: 2017-08-01

## 2017-08-01 ENCOUNTER — APPOINTMENT (OUTPATIENT)
Dept: GENERAL RADIOLOGY | Facility: HOSPITAL | Age: 67
End: 2017-08-01

## 2017-08-01 ENCOUNTER — HOSPITAL ENCOUNTER (INPATIENT)
Facility: HOSPITAL | Age: 67
LOS: 2 days | Discharge: HOME HEALTH | End: 2017-08-03
Attending: ORTHOPAEDIC SURGERY | Admitting: ORTHOPAEDIC SURGERY

## 2017-08-01 ENCOUNTER — ANESTHESIA (OUTPATIENT)
Dept: PERIOP | Facility: HOSPITAL | Age: 67
End: 2017-08-01

## 2017-08-01 DIAGNOSIS — E83.19 OTHER DISORDERS OF IRON METABOLISM: ICD-10-CM

## 2017-08-01 DIAGNOSIS — Z96.651 STATUS POST TOTAL RIGHT KNEE REPLACEMENT: ICD-10-CM

## 2017-08-01 DIAGNOSIS — Z74.09 IMPAIRED FUNCTIONAL MOBILITY, BALANCE, GAIT, AND ENDURANCE: Primary | ICD-10-CM

## 2017-08-01 DIAGNOSIS — M17.11 PRIMARY OSTEOARTHRITIS OF RIGHT KNEE: ICD-10-CM

## 2017-08-01 DIAGNOSIS — Z01.818 PRE-OP EVALUATION: ICD-10-CM

## 2017-08-01 DIAGNOSIS — G89.29 CHRONIC PAIN OF RIGHT KNEE: ICD-10-CM

## 2017-08-01 DIAGNOSIS — M25.561 CHRONIC PAIN OF RIGHT KNEE: ICD-10-CM

## 2017-08-01 DIAGNOSIS — Z78.9 IMPAIRED MOBILITY AND ADLS: ICD-10-CM

## 2017-08-01 DIAGNOSIS — Z74.09 IMPAIRED MOBILITY AND ADLS: ICD-10-CM

## 2017-08-01 DIAGNOSIS — Z01.818 PRE-OP TESTING: ICD-10-CM

## 2017-08-01 PROCEDURE — 94760 N-INVAS EAR/PLS OXIMETRY 1: CPT

## 2017-08-01 PROCEDURE — 25010000003 CEFAZOLIN PER 500 MG: Performed by: ORTHOPAEDIC SURGERY

## 2017-08-01 PROCEDURE — 25010000002 ONDANSETRON PER 1 MG: Performed by: NURSE ANESTHETIST, CERTIFIED REGISTERED

## 2017-08-01 PROCEDURE — 99221 1ST HOSP IP/OBS SF/LOW 40: CPT | Performed by: NURSE PRACTITIONER

## 2017-08-01 PROCEDURE — 97162 PT EVAL MOD COMPLEX 30 MIN: CPT

## 2017-08-01 PROCEDURE — 25010000002 MEPERIDINE PER 100 MG: Performed by: NURSE ANESTHETIST, CERTIFIED REGISTERED

## 2017-08-01 PROCEDURE — 0SRC0J9 REPLACEMENT OF RIGHT KNEE JOINT WITH SYNTHETIC SUBSTITUTE, CEMENTED, OPEN APPROACH: ICD-10-PCS | Performed by: ORTHOPAEDIC SURGERY

## 2017-08-01 PROCEDURE — 27447 TOTAL KNEE ARTHROPLASTY: CPT | Performed by: ORTHOPAEDIC SURGERY

## 2017-08-01 PROCEDURE — 73560 X-RAY EXAM OF KNEE 1 OR 2: CPT

## 2017-08-01 PROCEDURE — 25010000002 DEXAMETHASONE PER 1 MG: Performed by: NURSE ANESTHETIST, CERTIFIED REGISTERED

## 2017-08-01 PROCEDURE — C1713 ANCHOR/SCREW BN/BN,TIS/BN: HCPCS | Performed by: ORTHOPAEDIC SURGERY

## 2017-08-01 PROCEDURE — 25010000002 MORPHINE PER 10 MG: Performed by: ORTHOPAEDIC SURGERY

## 2017-08-01 PROCEDURE — 25010000002 MIDAZOLAM PER 1 MG: Performed by: NURSE ANESTHETIST, CERTIFIED REGISTERED

## 2017-08-01 PROCEDURE — 97165 OT EVAL LOW COMPLEX 30 MIN: CPT

## 2017-08-01 PROCEDURE — 88300 SURGICAL PATH GROSS: CPT | Performed by: ORTHOPAEDIC SURGERY

## 2017-08-01 PROCEDURE — C1776 JOINT DEVICE (IMPLANTABLE): HCPCS | Performed by: ORTHOPAEDIC SURGERY

## 2017-08-01 DEVICE — COMP FEM/KN VANGUARD INTLK CR 62.5MM NS RT: Type: IMPLANTABLE DEVICE | Site: KNEE | Status: FUNCTIONAL

## 2017-08-01 DEVICE — CAP TOTL KN CMT PREMIUM: Type: IMPLANTABLE DEVICE | Site: KNEE | Status: FUNCTIONAL

## 2017-08-01 DEVICE — BEAR TIB/KN VANGUARD AS 14X71MM NS: Type: IMPLANTABLE DEVICE | Site: KNEE | Status: FUNCTIONAL

## 2017-08-01 DEVICE — TRY TIB CRUC 71MM: Type: IMPLANTABLE DEVICE | Site: KNEE | Status: FUNCTIONAL

## 2017-08-01 DEVICE — PAT 3PEG THN 31X6.2  31MM: Type: IMPLANTABLE DEVICE | Site: KNEE | Status: FUNCTIONAL

## 2017-08-01 DEVICE — CMT BONE SIMPLEX/P FULL DOSE 10/PK: Type: IMPLANTABLE DEVICE | Site: KNEE | Status: FUNCTIONAL

## 2017-08-01 RX ORDER — BUPIVACAINE HYDROCHLORIDE 5 MG/ML
INJECTION, SOLUTION EPIDURAL; INTRACAUDAL AS NEEDED
Status: DISCONTINUED | OUTPATIENT
Start: 2017-08-01 | End: 2017-08-01 | Stop reason: SURG

## 2017-08-01 RX ORDER — ASPIRIN 81 MG/1
81 TABLET ORAL DAILY
Status: DISCONTINUED | OUTPATIENT
Start: 2017-08-01 | End: 2017-08-03 | Stop reason: HOSPADM

## 2017-08-01 RX ORDER — GABAPENTIN 300 MG/1
300 CAPSULE ORAL ONCE
Status: COMPLETED | OUTPATIENT
Start: 2017-08-01 | End: 2017-08-01

## 2017-08-01 RX ORDER — ACETAMINOPHEN 500 MG
1000 TABLET ORAL ONCE
Status: DISCONTINUED | OUTPATIENT
Start: 2017-08-01 | End: 2017-08-01

## 2017-08-01 RX ORDER — ONDANSETRON 2 MG/ML
4 INJECTION INTRAMUSCULAR; INTRAVENOUS ONCE
Status: DISCONTINUED | OUTPATIENT
Start: 2017-08-01 | End: 2017-08-01 | Stop reason: HOSPADM

## 2017-08-01 RX ORDER — ACETAMINOPHEN 325 MG/1
TABLET ORAL
Status: COMPLETED
Start: 2017-08-01 | End: 2017-08-01

## 2017-08-01 RX ORDER — BUPIVACAINE HYDROCHLORIDE 7.5 MG/ML
INJECTION, SOLUTION INTRASPINAL AS NEEDED
Status: DISCONTINUED | OUTPATIENT
Start: 2017-08-01 | End: 2017-08-01 | Stop reason: SURG

## 2017-08-01 RX ORDER — NALOXONE HCL 0.4 MG/ML
0.4 VIAL (ML) INJECTION
Status: DISCONTINUED | OUTPATIENT
Start: 2017-08-01 | End: 2017-08-03 | Stop reason: HOSPADM

## 2017-08-01 RX ORDER — PROMETHAZINE HYDROCHLORIDE 25 MG/ML
6.25 INJECTION, SOLUTION INTRAMUSCULAR; INTRAVENOUS ONCE
Status: DISCONTINUED | OUTPATIENT
Start: 2017-08-01 | End: 2017-08-01 | Stop reason: HOSPADM

## 2017-08-01 RX ORDER — BISACODYL 10 MG
10 SUPPOSITORY, RECTAL RECTAL DAILY PRN
Status: DISCONTINUED | OUTPATIENT
Start: 2017-08-01 | End: 2017-08-03 | Stop reason: HOSPADM

## 2017-08-01 RX ORDER — GABAPENTIN 300 MG/1
CAPSULE ORAL
Status: COMPLETED
Start: 2017-08-01 | End: 2017-08-01

## 2017-08-01 RX ORDER — FONDAPARINUX SODIUM 2.5 MG/.5ML
2.5 INJECTION SUBCUTANEOUS
Status: DISCONTINUED | OUTPATIENT
Start: 2017-08-02 | End: 2017-08-03 | Stop reason: HOSPADM

## 2017-08-01 RX ORDER — DOCUSATE SODIUM 100 MG/1
100 CAPSULE, LIQUID FILLED ORAL 2 TIMES DAILY PRN
Status: DISCONTINUED | OUTPATIENT
Start: 2017-08-01 | End: 2017-08-03 | Stop reason: HOSPADM

## 2017-08-01 RX ORDER — ONDANSETRON 4 MG/1
4 TABLET, FILM COATED ORAL EVERY 6 HOURS PRN
Status: DISCONTINUED | OUTPATIENT
Start: 2017-08-01 | End: 2017-08-03 | Stop reason: HOSPADM

## 2017-08-01 RX ORDER — MIDAZOLAM HYDROCHLORIDE 1 MG/ML
INJECTION INTRAMUSCULAR; INTRAVENOUS AS NEEDED
Status: DISCONTINUED | OUTPATIENT
Start: 2017-08-01 | End: 2017-08-01 | Stop reason: SURG

## 2017-08-01 RX ORDER — TACROLIMUS 0.5 MG/1
5 CAPSULE ORAL EVERY 12 HOURS
Status: DISCONTINUED | OUTPATIENT
Start: 2017-08-01 | End: 2017-08-01

## 2017-08-01 RX ORDER — SODIUM CHLORIDE 0.9 % (FLUSH) 0.9 %
1-10 SYRINGE (ML) INJECTION AS NEEDED
Status: DISCONTINUED | OUTPATIENT
Start: 2017-08-01 | End: 2017-08-03 | Stop reason: HOSPADM

## 2017-08-01 RX ORDER — MEPERIDINE HYDROCHLORIDE 50 MG/ML
INJECTION INTRAMUSCULAR; INTRAVENOUS; SUBCUTANEOUS AS NEEDED
Status: DISCONTINUED | OUTPATIENT
Start: 2017-08-01 | End: 2017-08-01 | Stop reason: SURG

## 2017-08-01 RX ORDER — FAMOTIDINE 10 MG/ML
INJECTION, SOLUTION INTRAVENOUS
Status: COMPLETED
Start: 2017-08-01 | End: 2017-08-01

## 2017-08-01 RX ORDER — CALCIUM CARBONATE 200(500)MG
1 TABLET,CHEWABLE ORAL DAILY
Status: DISCONTINUED | OUTPATIENT
Start: 2017-08-01 | End: 2017-08-03 | Stop reason: HOSPADM

## 2017-08-01 RX ORDER — DEXAMETHASONE SODIUM PHOSPHATE 4 MG/ML
INJECTION, SOLUTION INTRA-ARTICULAR; INTRALESIONAL; INTRAMUSCULAR; INTRAVENOUS; SOFT TISSUE AS NEEDED
Status: DISCONTINUED | OUTPATIENT
Start: 2017-08-01 | End: 2017-08-01 | Stop reason: SURG

## 2017-08-01 RX ORDER — OXYCODONE AND ACETAMINOPHEN 10; 325 MG/1; MG/1
1 TABLET ORAL ONCE
Status: COMPLETED | OUTPATIENT
Start: 2017-08-01 | End: 2017-08-01

## 2017-08-01 RX ORDER — OXYCODONE AND ACETAMINOPHEN 10; 325 MG/1; MG/1
TABLET ORAL
Status: COMPLETED
Start: 2017-08-01 | End: 2017-08-01

## 2017-08-01 RX ORDER — ATORVASTATIN CALCIUM 20 MG/1
20 TABLET, FILM COATED ORAL NIGHTLY
Status: DISCONTINUED | OUTPATIENT
Start: 2017-08-01 | End: 2017-08-03 | Stop reason: HOSPADM

## 2017-08-01 RX ORDER — MELATONIN
1000 DAILY
Status: DISCONTINUED | OUTPATIENT
Start: 2017-08-01 | End: 2017-08-03 | Stop reason: HOSPADM

## 2017-08-01 RX ORDER — MORPHINE SULFATE 4 MG/ML
4 INJECTION, SOLUTION INTRAMUSCULAR; INTRAVENOUS
Status: DISCONTINUED | OUTPATIENT
Start: 2017-08-01 | End: 2017-08-03 | Stop reason: HOSPADM

## 2017-08-01 RX ORDER — SODIUM CHLORIDE 0.9 % (FLUSH) 0.9 %
1-10 SYRINGE (ML) INJECTION AS NEEDED
Status: DISCONTINUED | OUTPATIENT
Start: 2017-08-01 | End: 2017-08-01 | Stop reason: HOSPADM

## 2017-08-01 RX ORDER — HYDROCODONE BITARTRATE AND ACETAMINOPHEN 7.5; 325 MG/1; MG/1
1 TABLET ORAL EVERY 6 HOURS PRN
Status: DISCONTINUED | OUTPATIENT
Start: 2017-08-01 | End: 2017-08-03 | Stop reason: HOSPADM

## 2017-08-01 RX ORDER — LISINOPRIL 10 MG/1
10 TABLET ORAL NIGHTLY
Status: DISCONTINUED | OUTPATIENT
Start: 2017-08-01 | End: 2017-08-02

## 2017-08-01 RX ORDER — MORPHINE SULFATE 2 MG/ML
2 INJECTION, SOLUTION INTRAMUSCULAR; INTRAVENOUS
Status: DISCONTINUED | OUTPATIENT
Start: 2017-08-01 | End: 2017-08-01 | Stop reason: HOSPADM

## 2017-08-01 RX ORDER — CEFAZOLIN SODIUM 1 G/3ML
INJECTION, POWDER, FOR SOLUTION INTRAMUSCULAR; INTRAVENOUS
Status: DISPENSED
Start: 2017-08-01 | End: 2017-08-01

## 2017-08-01 RX ORDER — ONDANSETRON 4 MG/1
4 TABLET, ORALLY DISINTEGRATING ORAL EVERY 6 HOURS PRN
Status: DISCONTINUED | OUTPATIENT
Start: 2017-08-01 | End: 2017-08-03 | Stop reason: HOSPADM

## 2017-08-01 RX ORDER — SODIUM CHLORIDE, SODIUM LACTATE, POTASSIUM CHLORIDE, CALCIUM CHLORIDE 600; 310; 30; 20 MG/100ML; MG/100ML; MG/100ML; MG/100ML
100 INJECTION, SOLUTION INTRAVENOUS CONTINUOUS
Status: DISCONTINUED | OUTPATIENT
Start: 2017-08-01 | End: 2017-08-03

## 2017-08-01 RX ORDER — ACETAMINOPHEN 325 MG/1
650 TABLET ORAL ONCE
Status: COMPLETED | OUTPATIENT
Start: 2017-08-01 | End: 2017-08-01

## 2017-08-01 RX ORDER — MEPERIDINE HYDROCHLORIDE 25 MG/ML
25 INJECTION INTRAMUSCULAR; INTRAVENOUS; SUBCUTANEOUS
Status: DISCONTINUED | OUTPATIENT
Start: 2017-08-01 | End: 2017-08-01 | Stop reason: HOSPADM

## 2017-08-01 RX ORDER — SODIUM CHLORIDE, SODIUM LACTATE, POTASSIUM CHLORIDE, CALCIUM CHLORIDE 600; 310; 30; 20 MG/100ML; MG/100ML; MG/100ML; MG/100ML
1000 INJECTION, SOLUTION INTRAVENOUS CONTINUOUS PRN
Status: DISCONTINUED | OUTPATIENT
Start: 2017-08-01 | End: 2017-08-01 | Stop reason: HOSPADM

## 2017-08-01 RX ORDER — PANTOPRAZOLE SODIUM 40 MG/1
40 TABLET, DELAYED RELEASE ORAL EVERY MORNING
Status: DISCONTINUED | OUTPATIENT
Start: 2017-08-02 | End: 2017-08-03 | Stop reason: HOSPADM

## 2017-08-01 RX ORDER — ONDANSETRON 2 MG/ML
INJECTION INTRAMUSCULAR; INTRAVENOUS AS NEEDED
Status: DISCONTINUED | OUTPATIENT
Start: 2017-08-01 | End: 2017-08-01 | Stop reason: SURG

## 2017-08-01 RX ORDER — ACETAMINOPHEN 325 MG/1
650 TABLET ORAL EVERY 12 HOURS PRN
Status: DISCONTINUED | OUTPATIENT
Start: 2017-08-01 | End: 2017-08-03 | Stop reason: HOSPADM

## 2017-08-01 RX ORDER — MULTIPLE VITAMINS W/ MINERALS TAB 9MG-400MCG
1 TAB ORAL DAILY
Status: DISCONTINUED | OUTPATIENT
Start: 2017-08-01 | End: 2017-08-03 | Stop reason: HOSPADM

## 2017-08-01 RX ORDER — ONDANSETRON 2 MG/ML
4 INJECTION INTRAMUSCULAR; INTRAVENOUS EVERY 6 HOURS PRN
Status: DISCONTINUED | OUTPATIENT
Start: 2017-08-01 | End: 2017-08-03 | Stop reason: HOSPADM

## 2017-08-01 RX ADMIN — BUPIVACAINE HYDROCHLORIDE IN DEXTROSE 1.6 ML: 7.5 INJECTION, SOLUTION SUBARACHNOID at 10:52

## 2017-08-01 RX ADMIN — SODIUM CHLORIDE, POTASSIUM CHLORIDE, SODIUM LACTATE AND CALCIUM CHLORIDE 1000 ML: 600; 310; 30; 20 INJECTION, SOLUTION INTRAVENOUS at 10:09

## 2017-08-01 RX ADMIN — CEFAZOLIN 1 G: 1 INJECTION, POWDER, FOR SOLUTION INTRAMUSCULAR; INTRAVENOUS; PARENTERAL at 18:36

## 2017-08-01 RX ADMIN — DEXAMETHASONE SODIUM PHOSPHATE 4 MG: 4 INJECTION, SOLUTION INTRAMUSCULAR; INTRAVENOUS at 11:31

## 2017-08-01 RX ADMIN — GABAPENTIN 300 MG: 300 CAPSULE ORAL at 10:20

## 2017-08-01 RX ADMIN — Medication 100 MCG: at 12:11

## 2017-08-01 RX ADMIN — FAMOTIDINE 20 MG: 10 INJECTION, SOLUTION INTRAVENOUS at 10:09

## 2017-08-01 RX ADMIN — Medication 10 ML/HR: at 14:19

## 2017-08-01 RX ADMIN — OXYCODONE HYDROCHLORIDE AND ACETAMINOPHEN 1 TABLET: 10; 325 TABLET ORAL at 10:21

## 2017-08-01 RX ADMIN — MIDAZOLAM HYDROCHLORIDE 1 MG: 1 INJECTION, SOLUTION INTRAMUSCULAR; INTRAVENOUS at 12:32

## 2017-08-01 RX ADMIN — ACETAMINOPHEN 650 MG: 325 TABLET ORAL at 10:22

## 2017-08-01 RX ADMIN — MIDAZOLAM HYDROCHLORIDE 1 MG: 1 INJECTION, SOLUTION INTRAMUSCULAR; INTRAVENOUS at 11:46

## 2017-08-01 RX ADMIN — MIDAZOLAM HYDROCHLORIDE 0.5 MG: 1 INJECTION, SOLUTION INTRAMUSCULAR; INTRAVENOUS at 11:40

## 2017-08-01 RX ADMIN — LISINOPRIL 10 MG: 10 TABLET ORAL at 21:04

## 2017-08-01 RX ADMIN — ONDANSETRON 4 MG: 2 INJECTION INTRAMUSCULAR; INTRAVENOUS at 11:31

## 2017-08-01 RX ADMIN — MORPHINE SULFATE 4 MG: 4 INJECTION, SOLUTION INTRAMUSCULAR; INTRAVENOUS at 21:04

## 2017-08-01 RX ADMIN — ACETAMINOPHEN 650 MG: 325 TABLET, FILM COATED ORAL at 10:22

## 2017-08-01 RX ADMIN — Medication 100 MCG: at 11:52

## 2017-08-01 RX ADMIN — MIDAZOLAM HYDROCHLORIDE 0.5 MG: 1 INJECTION, SOLUTION INTRAMUSCULAR; INTRAVENOUS at 11:25

## 2017-08-01 RX ADMIN — SODIUM CHLORIDE, POTASSIUM CHLORIDE, SODIUM LACTATE AND CALCIUM CHLORIDE: 600; 310; 30; 20 INJECTION, SOLUTION INTRAVENOUS at 12:15

## 2017-08-01 RX ADMIN — OXYCODONE AND ACETAMINOPHEN 1 TABLET: 10; 325 TABLET ORAL at 10:21

## 2017-08-01 RX ADMIN — BUPIVACAINE HYDROCHLORIDE 30 ML: 5 INJECTION, SOLUTION EPIDURAL; INTRACAUDAL; PERINEURAL at 14:03

## 2017-08-01 RX ADMIN — CEFAZOLIN SODIUM 2 G: 2 SOLUTION INTRAVENOUS at 10:40

## 2017-08-01 RX ADMIN — HYDROCODONE BITARTRATE AND ACETAMINOPHEN 1 TABLET: 7.5; 325 TABLET ORAL at 22:42

## 2017-08-01 RX ADMIN — MIDAZOLAM HYDROCHLORIDE 2 MG: 1 INJECTION, SOLUTION INTRAMUSCULAR; INTRAVENOUS at 10:43

## 2017-08-01 RX ADMIN — SODIUM CHLORIDE, POTASSIUM CHLORIDE, SODIUM LACTATE AND CALCIUM CHLORIDE 100 ML/HR: 600; 310; 30; 20 INJECTION, SOLUTION INTRAVENOUS at 15:45

## 2017-08-01 RX ADMIN — MORPHINE SULFATE 4 MG: 4 INJECTION, SOLUTION INTRAMUSCULAR; INTRAVENOUS at 18:37

## 2017-08-01 RX ADMIN — MEPERIDINE HYDROCHLORIDE 50 MG: 50 INJECTION INTRAMUSCULAR; INTRAVENOUS; SUBCUTANEOUS at 10:52

## 2017-08-01 RX ADMIN — ATORVASTATIN CALCIUM 20 MG: 20 TABLET, FILM COATED ORAL at 21:04

## 2017-08-01 RX ADMIN — TRANEXAMIC ACID 721 MG: 100 INJECTION, SOLUTION INTRAVENOUS at 12:18

## 2017-08-01 RX ADMIN — TRANEXAMIC ACID 721 MG: 100 INJECTION, SOLUTION INTRAVENOUS at 11:15

## 2017-08-01 NOTE — ANESTHESIA POSTPROCEDURE EVALUATION
Patient: Sahara Hardwick    Procedure Summary     Date Anesthesia Start Anesthesia Stop Room / Location    08/01/17 1046   [facility] /  [facility]       Procedure Diagnosis Surgeon Provider    Elective right total knee replacement (BIOMET) (Right Knee) Pre-op testing; Other disorders of iron metabolism; Pre-op evaluation; Primary osteoarthritis of right knee; Chronic pain of right knee  (Pre-op testing [Z01.818]; Other disorders of iron metabolism [E83.19]; Pre-op evaluation [Z01.818]; Primary osteoarthritis of right knee [M17.11]; Chronic pain of right knee [M25.561, G89.29]) MD Juan R Oconnor CRNA          Anesthesia Type: spinal  Last vitals  BP   127/68 (08/01/17 1338)    Temp   97.8 °F (36.6 °C) (08/01/17 1323)    Pulse   70 (08/01/17 1338)   Resp   12 (08/01/17 1341)    SpO2   97 % (08/01/17 1338)      Post Anesthesia Care and Evaluation    Patient location during evaluation: PACU  Patient participation: complete - patient participated  Level of consciousness: awake and awake and alert  Pain score: 0  Pain management: adequate  Airway patency: patent  Anesthetic complications: No anesthetic complications  PONV Status: none  Cardiovascular status: acceptable  Respiratory status: acceptable, spontaneous ventilation, nonlabored ventilation and nasal cannula  Hydration status: acceptable    Comments: Patient able to move both legs but spinal block not yet totally resolved. Adductor canal OnQ catheter, Anterior Periosteal Infiltration of femur and iPak blocks already placed in PACU for POPC

## 2017-08-01 NOTE — ANESTHESIA PREPROCEDURE EVALUATION
Anesthesia Evaluation     Patient summary reviewed and Nursing notes reviewed   no history of anesthetic complications:  NPO Solid Status: > 8 hours  NPO Liquid Status: > 8 hours     Airway   Mallampati: I  TM distance: >3 FB  Neck ROM: full  no difficulty expected  Dental - normal exam     Pulmonary - negative pulmonary ROS and normal exam    breath sounds clear to auscultation  (-) not a smoker  Cardiovascular - normal exam    ECG reviewed  Rhythm: regular  Rate: normal    (+) hypertension well controlled, hyperlipidemia    ROS comment: EKG NSR 69 Normal    Neuro/Psych- negative ROS  GI/Hepatic/Renal/Endo    (+)  GERD well controlled,     Musculoskeletal     Abdominal    Substance History - negative use     OB/GYN negative ob/gyn ROS         Other   (+) arthritis     ROS/Med Hx Other: H/H 14.4/46.9  K 4.8  PT 12.1 INR 1.1                                Anesthesia Plan    ASA 3     spinal   (Risks of spinal anesthesia discussed , including but not limited to:  Headache, itching, nausea and vomiting, infection, failure of the block, decreased BP, permanent chronic back pain, nerve damage, paralysis, etc.    Patient told that a low dose narcotic, such as morphine, may be added to the spinal local anesthetic which can provide 12-15 hours of pain relief after the spinal block wears off. Side effects can be itching, nausea/vomiting but medicine will be availble to treat these symptoms if needed.  Discussed Adductor Canal block with OnQ catheter to be placed in PACU post procedure along with an iPack block.  All questions answered and informed consent obtained.)  intravenous induction   Anesthetic plan and risks discussed with patient.

## 2017-08-01 NOTE — THERAPY EVALUATION
Acute Care - Physical Therapy Initial Evaluation   Jacob     Patient Name: Sahara Hardwick  : 1950  MRN: [id number]  Today's Date: 2017   Onset of Illness/Injury or Date of Surgery Date: 17  Date of Referral to PT: 17  Referring Physician: Dr. Dewitt      Admit Date: 2017     Visit Dx:    ICD-10-CM ICD-9-CM   1. Impaired functional mobility, balance, gait, and endurance Z74.09 V49.89   2. Primary osteoarthritis of right knee M17.11 715.16   3. Chronic pain of right knee M25.561 719.46    G89.29 338.29   4. Pre-op testing Z01.818 V72.84   5. Pre-op evaluation Z01.818 V72.84   6. Other disorders of iron metabolism  E83.19 275.09   7. Other disorders of iron metabolism E83.19 275.09   8. Impaired mobility and ADLs Z74.09 799.89     Patient Active Problem List   Diagnosis   • Primary osteoarthritis of right knee     Past Medical History:   Diagnosis Date   • Arthritis    • Diverticulitis    • GERD (gastroesophageal reflux disease)    • High cholesterol    • Hypertension      Past Surgical History:   Procedure Laterality Date   • GALLBLADDER SURGERY     • HAND SURGERY      right middle finger arthritis nodule removed.   • HYSTERECTOMY            PT ASSESSMENT (last 72 hours)      PT Evaluation       17 1510 17 1508    Rehab Evaluation    Document Type evaluation  -SD evaluation  -JR    Subjective Information agree to therapy;no complaints  -SD agree to therapy;no complaints  -JR    Patient Effort, Rehab Treatment good  -SD good  -JR    Symptoms Noted During/After Treatment none  -SD none  -JR    General Information    Patient Profile Review yes  -SD yes  -JR    Onset of Illness/Injury or Date of Surgery Date 17  -SD 17  -JR    Referring Physician Dr. Dewitt  -SD John Dewitt MD  -JR    General Observations Supine with snell cath, on-Q in R LE, IV in L UE  -SD Supine with snell cath, On-Q in RLE, IV in LUE  -JR    Pertinent History Of Current Problem  chronic R knee pain, s/p TKA  -SD chronic Right knee pain s/p TKA  -JR    Precautions/Limitations fall precautions  -SD fall precautions  -JR    Prior Level of Function independent:;all household mobility;ADL's;gait  -SD independent:;all household mobility  -JR    Equipment Currently Used at Home cane, straight;shower chair  -SD cane, straight;commode;shower chair  -JR    Plans/Goals Discussed With patient;spouse/S.O.;agreed upon  -SD patient;spouse/S.O.;agreed upon  -JR    Risks Reviewed patient:;spouse/S.O.:;increased discomfort  -SD patient:;spouse/S.O.:;increased discomfort  -JR    Benefits Reviewed patient:;spouse/S.O.:;improve function  -SD patient:;spouse/S.O.:;increase balance;increase strength;increase independence;improve function  -JR    Barriers to Rehab none identified  -SD none identified  -JR    Living Environment    Lives With spouse  -SD spouse  -JR    Living Arrangements house  -SD house  -JR    Home Accessibility bed and bath on same level;stairs to enter home  -SD stairs to enter home  -JR    Number of Stairs to Enter Home 2  -SD 2  -JR    Stair Railings at Home none  -SD     Type of Financial/Environmental Concern none  -SD     Transportation Available family or friend will provide  -SD     Clinical Impression    Date of Referral to PT  08/01/17  -JR    PT Diagnosis  gait abnormality, weakness and decreased AROM of R knee  -JR    Prognosis  Good  -JR    Patient/Family Goals Statement  Pt wants to go home  -JR    Rehab Potential  good, to achieve stated therapy goals  -JR    Pain Assessment    Pain Assessment No/denies pain  -SD No/denies pain  -JR    Vision Assessment/Intervention    Visual Impairment WFL  -SD     Cognitive Assessment/Intervention    Current Cognitive/Communication Assessment functional  -SD functional  -JR    Orientation Status oriented x 4  -SD oriented x 4  -JR    Follows Commands/Answers Questions needs cueing  -SD able to follow multi-step instructions  -JR    Personal  Safety decreased awareness, need for assist;decreased awareness, need for safety  -SD mild impairment  -JR    Personal Safety Interventions  fall prevention program maintained;gait belt  -JR    ROM (Range of Motion)    General ROM lower extremity range of motion deficits identified  -SD     General ROM Detail  Rt knee=5 to 80  -JR    MMT (Manual Muscle Testing)    General MMT Assessment upper extremity strength deficits identified;lower extremity strength deficits identified  -SD     General MMT Assessment Detail  Rt knee = 3/5  -JR    Bed Mobility, Assessment/Treatment    Bed Mobility, Assistive Device bed rails;head of bed elevated  -SD head of bed elevated;bed rails  -JR    Bed Mob, Supine to Sit, [place] minimum assist (75% patient effort)  -SD minimum assist (75% patient effort)  -JR    Bed Mob, Sit to Supine, [place] minimum assist (75% patient effort)  -SD minimum assist (75% patient effort)  -JR    Bed Mobility, Safety Issues decreased use of legs for bridging/pushing  -SD     Bed Mobility, Impairments strength decreased  -SD strength decreased;ROM decreased;coordination impaired;motor control impaired  -JR    Transfer Assessment/Treatment    Transfers, Sit-Stand [place] minimum assist (75% patient effort)  -SD     Transfers, Stand-Sit [place] minimum assist (75% patient effort)  -SD minimum assist (75% patient effort)  -JR    Transfers, Sit-Stand-Sit, Assist Device rolling walker  -SD quad cane  -    Gait Assessment/Treatment    Gait, [place] Level  contact guard assist  -JR    Gait, Assistive Device  rolling walker  -JR    Gait, Distance (Feet)  140  -    Gait, Gait Pattern Analysis  swing-through gait  -JR    Gait, Gait Deviations  stride width increased;decreased heel strike;limb motion velocity decreased  -    Gait, Safety Issues  sequencing ability decreased;step length decreased;weight-shifting ability decreased  -    Positioning and Restraints    Pre-Treatment  Position in bed  -SD in bed  -JR    Post Treatment Position chair  -SD chair  -JR    In Chair reclined;call light within reach;encouraged to call for assist;with family/caregiver  -SD reclined;call light within reach;encouraged to call for assist;with family/caregiver  -      08/01/17 0939       General Information    Equipment Currently Used at Home none  -KR     Living Environment    Lives With spouse  -KR     Living Arrangements house  -KR     Home Accessibility no concerns  -KR     Stair Railings at Home none  -KR     Type of Financial/Environmental Concern none  -KR     Transportation Available none  -KR       User Key  (r) = Recorded By, (t) = Taken By, (c) = Cosigned By    Initials Name Provider Type    JR Betzaida Roy, PT Physical Therapist    EDWIN Blackwell, RN Registered Nurse    ARMAND Ledesma, OT Therapist          Physical Therapy Education     Title: PT OT SLP Therapies (Active)     Topic: Physical Therapy (Active)     Point: Mobility training (Done)    Learning Progress Summary    Learner Readiness Method Response Comment Documented by Status   Patient Acceptance E VU Role of PT/POC  08/01/17 1558 Done   Significant Other Acceptance E VU Role of PT/POC  08/01/17 1558 Done                      User Key     Initials Effective Dates Name Provider Type Discipline     10/26/16 -  Betzaida Roy, PT Physical Therapist PT                PT Recommendation and Plan  Anticipated Discharge Disposition: home with home health  Planned Therapy Interventions: strengthening, transfer training, balance training, bed mobility training, joint mobilization, patient/family education  PT Frequency: 2 times/day, daily (daily sat/sun)  Plan of Care Review  Plan Of Care Reviewed With: patient, spouse  Outcome Summary/Follow up Plan: Patient participated well in skilled PT and demonstrates decreased strength and AROM in Right knee.  She is expected to benefit from additional PT services while hospitalized and upon  discharge to home with home health.          IP PT Goals       08/01/17 1600          Bed Mobility PT LTG    Bed Mobility PT LTG, Date Established 08/01/17  -JR      Bed Mobility PT LTG, Time to Achieve 2 wks  -JR      Bed Mobility PT LTG, Activity Type all bed mobility  -JR      Bed Mobility PT LTG, [place] Level conditional independence  -JR      Bed Mobility PT Goal  LTG, Assist Device bed rails  -JR      Transfer Training PT LTG    Transfer Training PT LTG, Date Established 08/01/17  -JR      Transfer Training PT LTG, Time to Achieve 2 wks  -JR      Transfer Training PT LTG, Activity Type bed to chair /chair to bed;sit to stand/stand to sit  -JR      Transfer Training PT LTG, [place] Level supervision required  -JR      Transfer Training PT LTG, Assist Device walker, rolling  -JR      Gait Training PT LTG    Gait Training Goal PT LTG, Date Established 08/01/17  -JR      Gait Training Goal PT LTG, Time to Achieve 2 wks  -JR      Gait Training Goal PT LTG, [place] Level supervision required  -JR      Gait Training Goal PT LTG, Assist Device walker, rolling  -JR      Gait Training Goal PT LTG, Distance to Achieve 400  -JR      Gait Training Goal PT LTG, Additional Goal with good posture and even step lengths  -JR      Strength Goal PT LTG    Strength Goal PT LTG, Date Established 08/01/17  -JR      Strength Goal PT LTG, Time to Achieve 2 wks  -JR      Strength Goal PT LTG, Measure to Achieve perform exercises x 20 reps  -JR        User Key  (r) = Recorded By, (t) = Taken By, (c) = Cosigned By    Initials Name Provider Type    JR Betzaida Roy PT Physical Therapist                Outcome Measures       08/01/17 1510 08/01/17 1508       How much help from another person do you currently need...    Turning from your back to your side while in flat bed without using bedrails?  3  -JR     Moving from lying on back to sitting on the side of a flat bed without bedrails?  3  -JR     Moving to and from a bed  to a chair (including a wheelchair)?  3  -JR     Standing up from a chair using your arms (e.g., wheelchair, bedside chair)?  3  -JR     Climbing 3-5 steps with a railing?  3  -JR     To walk in hospital room?  3  -JR     AM-PAC 6 Clicks Score  18  -JR     How much help from another is currently needed...    Putting on and taking off regular lower body clothing? 3  -SD      Bathing (including washing, rinsing, and drying) 3  -SD      Toileting (which includes using toilet bed pan or urinal) 3  -SD      Putting on and taking off regular upper body clothing 3  -SD      Taking care of personal grooming (such as brushing teeth) 4  -SD      Eating meals 4  -SD      Score 20  -SD      Functional Assessment    Outcome Measure Options AM-PAC 6 Clicks Daily Activity (OT)  -SD AM-PAC 6 Clicks Basic Mobility (PT)  -       User Key  (r) = Recorded By, (t) = Taken By, (c) = Cosigned By    Initials Name Provider Type    JR Betzaida Roy PT Physical Therapist    ARMAND Ledesma OT Therapist           Time Calculation:         PT Charges       08/01/17 1559          Time Calculation    Start Time 1508  -JR      PT Received On 08/01/17  -      PT Goal Re-Cert Due Date 08/11/17  -        User Key  (r) = Recorded By, (t) = Taken By, (c) = Cosigned By    Initials Name Provider Type    JR Betzaida Roy PT Physical Therapist          Therapy Charges for Today     Code Description Service Date Service Provider Modifiers Qty    42533782388 HC PT EVAL MOD COMPLEXITY 4 8/1/2017 Betzaida Roy, PT GP 1          PT G-Codes  Outcome Measure Options: AM-PAC 6 Clicks Daily Activity (OT)      Betzaida Roy PT  8/1/2017

## 2017-08-01 NOTE — PLAN OF CARE
Problem: Patient Care Overview (Adult)  Goal: Plan of Care Review  Outcome: Ongoing (interventions implemented as appropriate)    08/01/17 1602   Coping/Psychosocial Response Interventions   Plan Of Care Reviewed With patient;spouse   Outcome Evaluation   Outcome Summary/Follow up Plan Patient participated well in skilled PT and demonstrates decreased strength and AROM in Right knee. She is expected to benefit from additional PT services while hospitalized and upon discharge to home with home health.

## 2017-08-01 NOTE — ANESTHESIA PROCEDURE NOTES
Peripheral Block    Patient location during procedure: post-op  Start time: 8/1/2017 1:45 PM  Stop time: 8/1/2017 2:03 PM  Reason for block: at surgeon's request and post-op pain management  Preanesthetic Checklist  Completed: patient identified, site marked, surgical consent, pre-op evaluation, timeout performed, IV checked, risks and benefits discussed and monitors and equipment checked  Prep:  Sterile barriers:cap, gloves, mask and sterile barriers  Prep: ChloraPrep  Patient monitoring: blood pressure monitoring, continuous pulse oximetry and EKG  Procedure  Sedation:no  Guidance:ultrasound guided  Images:still images obtained    Laterality:right  Block Type:adductor canal block    Needle Type:echogenic and Tuohy  Needle Gauge:18 G    Medications  Comment:Time out for blocks done at 13:29  Local Injected:bupivacaine 0.25% Local Amount Injected:20 (10 ml of NS and 10 ml of 0.5% Bup)mL  Post Assessment  Injection Assessment: negative aspiration for heme, no paresthesia on injection and incremental injection  Patient Tolerance:comfortable throughout block  Complications:no  Additional Notes  Procedure:          CATHETER ADDUCTOR CANAL BLOCK                                                             CATHETER at skin: 4                                Patient analgesia was achieved with SAB       The pt was placed in the Supine position.  The Insertion site was  prepped and Draped in sterile fashion.  A  I-Flow 18g echogenic needle was then  inserted approximately midline, mid-thigh and advanced In-plane with Ultrasound guidance.  Normal Saline PSF was utilized for hydrodissection of tissue.  The Vastus medialis and Sartorius muscle where visualized and the needle tip was placed in the adductor canal,  lateral to the femoral artery.  LA injection spread was visualized, injection was incremental 1-5 ml, injection pressure was normal or little; no intraneural injection; no vascular injection.  LA dose was injected thru  the needle (see dose above).  I-flow 20g catheter was placed via the needle with ultrasound visualization and confirmation with NS fluid bolus or air injection. The needle was then removed and the skin was sealed with Skin Affiix at catheter insertion site.  Skin was prepped with benzoin or mastisol and the labeled curled catheter was secured with steristrips and a transparent dressing.

## 2017-08-01 NOTE — PLAN OF CARE
Problem: Patient Care Overview (Adult)  Goal: Plan of Care Review  Outcome: Ongoing (interventions implemented as appropriate)    08/01/17 1944   Coping/Psychosocial Response Interventions   Plan Of Care Reviewed With patient;spouse       Goal: Adult Individualization and Mutuality  Outcome: Ongoing (interventions implemented as appropriate)  Goal: Discharge Needs Assessment  Outcome: Ongoing (interventions implemented as appropriate)    Problem: Perioperative Period (Adult)  Goal: Signs and Symptoms of Listed Potential Problems Will be Absent or Manageable (Perioperative Period)  Outcome: Ongoing (interventions implemented as appropriate)    Problem: Anesthesia/Analgesia, Neuraxial (Adult)  Goal: Signs and Symptoms of Listed Potential Problems Will be Absent or Manageable (Anesthesia/Analgesia, Neuraxial)  Outcome: Ongoing (interventions implemented as appropriate)

## 2017-08-01 NOTE — PLAN OF CARE
Problem: Perioperative Period (Adult)  Intervention: Promote Pulmonary Hygiene and Secretion Clearance    08/01/17 1323   Activity   Activity Type bedrest       Intervention: Monitor/Manage Pain    08/01/17 1323   Safety Interventions   Medication Review/Management medications reviewed   Manage Acute Burn Pain   Pain Management Interventions (nerve block catheter)       Intervention: Promote Normothermia    08/01/17 1323   Cardiac Interventions   Warming Thermoregulation Maintenance skin exposure time minimized;warm blankets applied         Goal: Signs and Symptoms of Listed Potential Problems Will be Absent or Manageable (Perioperative Period)  Outcome: Ongoing (interventions implemented as appropriate)    08/01/17 1426   Perioperative Period   Problems Assessed (Perioperative Period) all   Problems Present (Perioperative Period) none   Pt meets PACU discharge criteria

## 2017-08-01 NOTE — PLAN OF CARE
Problem: Anesthesia/Analgesia, Neuraxial (Adult)  Intervention: Prevent/Manage Hypothermia    08/01/17 1323   Cardiac Interventions   Warming Thermoregulation Maintenance skin exposure time minimized;warm blankets applied       Intervention: Monitor/Manage Urinary Retention    08/01/17 1323   Genitourinary () Interventions   Urinary Elimination Promotion catheter patency maintained         Goal: Signs and Symptoms of Listed Potential Problems Will be Absent or Manageable (Anesthesia/Analgesia, Neuraxial)  Outcome: Ongoing (interventions implemented as appropriate)    08/01/17 1427   Anesthesia/Analgesia, Neuraxial   Problems Assessed (Neuraxial Anesthesia/Analgesia) all   Problems Present (Neuraxial Anesthesia/Analgesia) none   Pt meets PACU discharge criteria.

## 2017-08-01 NOTE — PLAN OF CARE
Problem: Inpatient Physical Therapy  Goal: Bed Mobility Goal LTG- PT  Outcome: Ongoing (interventions implemented as appropriate)    08/01/17 1600   Bed Mobility PT LTG   Bed Mobility PT LTG, Date Established 08/01/17   Bed Mobility PT LTG, Time to Achieve 2 wks   Bed Mobility PT LTG, Activity Type all bed mobility   Bed Mobility PT LTG, [place] Level conditional independence   Bed Mobility PT Goal LTG, Assist Device bed rails       Goal: Transfer Training Goal 1 LTG- PT  Outcome: Ongoing (interventions implemented as appropriate)    08/01/17 1600   Transfer Training PT LTG   Transfer Training PT LTG, Date Established 08/01/17   Transfer Training PT LTG, Time to Achieve 2 wks   Transfer Training PT LTG, Activity Type bed to chair /chair to bed;sit to stand/stand to sit   Transfer Training PT LTG, [place] Level supervision required   Transfer Training PT LTG, Assist Device walker, rolling       Goal: Gait Training Goal LTG- PT  Outcome: Ongoing (interventions implemented as appropriate)    08/01/17 1600   Gait Training PT LTG   Gait Training Goal PT LTG, Date Established 08/01/17   Gait Training Goal PT LTG, Time to Achieve 2 wks   Gait Training Goal PT LTG, [place] Level supervision required   Gait Training Goal PT LTG, Assist Device walker, rolling   Gait Training Goal PT LTG, Distance to Achieve 400   Gait Training Goal PT LTG, Additional Goal with good posture and even step lengths       Goal: Strength Goal LTG- PT  Outcome: Ongoing (interventions implemented as appropriate)    08/01/17 1600   Strength Goal PT LTG   Strength Goal PT LTG, Date Established 08/01/17   Strength Goal PT LTG, Time to Achieve 2 wks   Strength Goal PT LTG, Measure to Achieve perform exercises x 20 reps

## 2017-08-01 NOTE — PLAN OF CARE
Problem: Patient Care Overview (Adult)  Goal: Plan of Care Review  Outcome: Ongoing (interventions implemented as appropriate)    08/01/17 1604   Coping/Psychosocial Response Interventions   Plan Of Care Reviewed With patient   Outcome Evaluation   Outcome Summary/Follow up Plan Patient seen for OT eval this date. Good participation noted. No c/o pain. Patient required Min A for transfers, bed mobility and LB self care; CGA for functional mobility using '. Patient is expected to benefit from OT services to increase strength, mobility and ADL performance.    Patient Care Overview   Progress no change         Problem: Inpatient Occupational Therapy  Goal: Strength Goal LTG- OT  Outcome: Ongoing (interventions implemented as appropriate)    08/01/17 1604   Strength OT LTG   Strength Goal OT LTG, Date Established 08/01/17   Strength Goal OT LTG, Time to Achieve 2 wks   Strength Goal OT LTG, Measure to Achieve Patient will perform 15 reps using red therband in order to increase strength for carryover with ADL tasks   Strength Goal OT LTG, Outcome goal ongoing       Goal: Patient Education Goal LTG- OT  Outcome: Ongoing (interventions implemented as appropriate)    08/01/17 1604   Patient Education OT LTG   Patient Education OT LTG, Date Established 08/01/17   Patient Education OT LTG, Time to Achieve 2 wks   Patient Education OT LTG, Education Type adaptive equipment mgmt   Patient Education OT LTG, Education Understanding independent   Patient Education OT LTG Outcome goal ongoing       Goal: Bathing Goal LTG- OT  Outcome: Ongoing (interventions implemented as appropriate)    08/01/17 1604   Bathing OT LTG   Bathing Goal OT LTG, Date Established 08/01/17   Bathing Goal OT LTG, Time to Achieve 2 wks   Bathing Goal OT LTG, Activity Type lower body bathing   Bathing Goal OT LTG, Assist Device sponge, long handled   Bathing Goal OT LTG, Outcome goal ongoing       Goal: LB Dressing Goal LTG- OT  Outcome: Ongoing  (interventions implemented as appropriate)    08/01/17 1604   LB Dressing OT LTG   LB Dressing Goal OT LTG, Date Established 08/01/17   LB Dressing Goal OT LTG, Time to Achieve 2 wks   LB Dressing Goal OT LTG, [place] Level supervision required   LB Dressing Goal OT LTG, Adaptive Equipment reacher;sock-aid;shoe horn, long handled   LB Dressing Goal OT LTG, Outcome goal ongoing

## 2017-08-01 NOTE — OP NOTE
[facility] [address], [address]  [place]  [address] [phone]      OPERATIVE REPORT      PATIENT NAME:  Sahara Hardwick                            YOB: 1950                     PREOP DIAGNOSIS: Right knee advanced degenerative joint disease    POSTOP DIAGNOSIS: Same.    PROCEDURE:  Right total knee replacement    SURGEON:   John Dewitt M.D.    FIRST ASSIST:  Circulator: Greg Baxter RN; Catherine Rosales RN      Scrub Person: Rena Borjas; Debbi Rios; Maurizio Ventura; Urmila Rios    ANESTHETIST:  CRNA: Juan R Pedraza CRNA    ANESTHESIA:   General    FLUIDS:   1500 ml crystalloid    ESTIM BLOOD LOSS: 50 mL    URINE OUTPUT:  100 ml    SPECIMENS:   Knee bone cuts sent to pathology    DRAINS:   Benjamin to gravity    TOURNIQUET TIME:  57 min @ 275mm Hg    HARDWARE: A Biomet Vanguard cemented cruciate retaining total knee replacement with a size 62.5 mm cobalt chrome femoral component, a size 71 mm tibial component, 14 mm AS anterior stabilized polyethylene liner, and a 31 mm x 6.2 mm polyethylene patella    FINDINGS:                              End-stage DJD, osteophytes, erosions, cysts, bone-on-bone wear, three compartment involvement, significant varus deformity, extensor lag and ligament imbalance.    COMPLICATIONS:  None    DISPOSITION:  Stable to recovery.     INDICATIONS AND NARRATIVE:  Patient present for elective knee arthroplasty to address painful intractable advanced degenerative joint disease with knee swelling, stiffness, deformity, instability and dysfunction.  The patient presents for planned elective total knee replacement.  Risks and benefits of the surgery were discussed and informed consent was obtained with the patient.  Risks discussed including but not limited to anesthesia, infection, nerve/vessel/tendon injury, fracture, prosthetic loosening or wear, blood loss and possible need for transfusion, DVT and pulmonary embolus.  Goals  include pain relief, improved knee alignment, improved knee range of motion and better function for ambulation and activities.      Antibiotic prophylaxis was given.  Tranexemic acid protocol followed.  Surgeon site marking and a time out were performed prior to the procedure.  Anesthesia was effective and well tolerated.  The knee was prepped and draped in the usual sterile fashion.  A padded thigh tourniquet was placed for the procedure.    After sterile prep and drape, an anterior incision was made at the knee.  A medial para-patella arthrotomy was made and dissection continued proximally along the medial one-third of the quadriceps tendon and distally along the medial border of the patella tendon.  A partial inferior patella fat pad release was performed as well as a superior synovectomy.   Next, steps were taken to prepare the femoral, tibial and patella surfaces for the knee replacement implant.  Care was taken to incorporate 4 degrees of valgus and 3 degrees of external rotation in the femoral cut, neutral varus/valgus, neutral to slight external rotation and 3-5 degrees of posterior slope of the tibial cut, and the patella cut prepared to restore the original thickness with implants.  Equal flexion and extension gaps were achieved.  The minimally invasive instruments, sizing templates, cutting blocks, and guides were used together with radiographic x-ray templates.  The knee trial components achieved good alignment, fit, motion and stability. Soft tissue balancing was achieved in flexion and extension. The bone surfaces were thoroughly irrigated with antibiotic pulsed irrigation.  The entry to the femoral canal was closed with a bone plug.      Next, using standard cement technique including vacuum mixing, centrifugation and pressurization, the actual prosthetic components as described above were cemented in place.  Again, a trial liner was placed to check for the best range of motion and stability of the  knee.  The trial was removed and the actual polyethylene liner was placed onto the tibial tray and secured with the locking mechanism.  The knee was again taken through a final range of motion, alignment and stability check which was excellent.  There was also excellent patella tracking through the entire range of motion. The knee and leg showed good alignment.  The tourniquet was taken down and there was good hemostasis.  Minor venous bleeding was controlled with electrocautery.  The wound was irrigated copiously again as well as throughout the procedure and during closure with pulse lavage antibiotic irrigation.  Routine closure was performed consisting of interrupted figure-of-eight #1 Vicryl for the extensor mechanism, 2-0 Vicryl for deep and superficial subcutaneous layers and skin staples.  A sterile xeroform, gauze and Covaderm dressing was applied.  Anesthesia was effective and well tolerated.  There were no complications with the procedure.  The patient was transferred stable to recovery.      John Dewitt MD  8/1/2017  1:42 PM

## 2017-08-01 NOTE — THERAPY EVALUATION
Acute Care - Occupational Therapy Initial Evaluation   Jacob     Patient Name: Sahara Hardwick  : 1950  MRN: [id number]  Today's Date: 2017  Onset of Illness/Injury or Date of Surgery Date: 17  Date of Referral to OT: 17  Referring Physician: Dr. Dewitt    Admit Date: 2017       ICD-10-CM ICD-9-CM   1. Impaired functional mobility, balance, gait, and endurance Z74.09 V49.89   2. Primary osteoarthritis of right knee M17.11 715.16   3. Chronic pain of right knee M25.561 719.46    G89.29 338.29   4. Pre-op testing Z01.818 V72.84   5. Pre-op evaluation Z01.818 V72.84   6. Other disorders of iron metabolism  E83.19 275.09   7. Other disorders of iron metabolism E83.19 275.09   8. Impaired mobility and ADLs Z74.09 799.89     Patient Active Problem List   Diagnosis   • Primary osteoarthritis of right knee     Past Medical History:   Diagnosis Date   • Arthritis    • Diverticulitis    • GERD (gastroesophageal reflux disease)    • High cholesterol    • Hypertension      Past Surgical History:   Procedure Laterality Date   • GALLBLADDER SURGERY     • HAND SURGERY      right middle finger arthritis nodule removed.   • HYSTERECTOMY            OT ASSESSMENT FLOWSHEET (last 72 hours)      OT Evaluation       17 1510 17 1508 17 0939          Rehab Evaluation    Document Type evaluation  -SD evaluation  -JR       Subjective Information agree to therapy;no complaints  -SD agree to therapy;no complaints  -JR       Patient Effort, Rehab Treatment good  -SD good  -JR       Symptoms Noted During/After Treatment none  -SD none  -JR       General Information    Patient Profile Review yes  -SD yes  -JR       Onset of Illness/Injury or Date of Surgery Date 17  -SD 17  -JR       Referring Physician Dr. Dewitt  -SD John Dewitt MD  -JR       General Observations Supine with snell cath, on-Q in R LE, IV in L UE  -SD Supine with snell cath, On-Q in RLE, IV in LUE  -JR        Pertinent History Of Current Problem chronic R knee pain, s/p TKA  -SD chronic Right knee pain s/p TKA  -JR       Precautions/Limitations fall precautions  -SD fall precautions  -JR       Prior Level of Function independent:;all household mobility;ADL's;gait  -SD independent:;all household mobility  -JR       Equipment Currently Used at Home cane, straight;shower chair  -SD cane, straight;commode;shower chair  -JR none  -KR      Plans/Goals Discussed With patient;spouse/S.O.;agreed upon  -SD patient;spouse/S.O.;agreed upon  -JR       Risks Reviewed patient:;spouse/S.O.:;increased discomfort  -SD patient:;spouse/S.O.:;increased discomfort  -JR       Benefits Reviewed patient:;spouse/S.O.:;improve function  -SD patient:;spouse/S.O.:;increase balance;increase strength;increase independence;improve function  -JR       Barriers to Rehab none identified  -SD none identified  -JR       Living Environment    Lives With spouse  -SD spouse  -JR spouse  -KR      Living Arrangements house  -SD house  -JR house  -KR      Home Accessibility bed and bath on same level;stairs to enter home  -SD stairs to enter home  -JR no concerns  -KR      Number of Stairs to Enter Home 2  -SD 2  -JR       Stair Railings at Home none  -SD  none  -KR      Type of Financial/Environmental Concern none  -SD  none  -KR      Transportation Available family or friend will provide  -SD  none  -KR      Clinical Impression    Date of Referral to OT 08/01/17  -SD        OT Diagnosis ADL decline  -SD        Impairments Found (describe specific impairments) aerobic capacity/endurance;gait, locomotion, and balance   ADL decline  -SD        Criteria for Skilled Therapeutic Interventions Met yes  -SD        Rehab Potential good, to achieve stated therapy goals  -SD        Therapy Frequency 3-5 times/wk  -SD        Anticipated Discharge Disposition home with home health  -SD        Functional Level Prior    Ambulation 0-->independent  -SD  0-->independent   -KR      Transferring 0-->independent  -SD  0-->independent  -KR      Toileting 0-->independent  -SD  0-->independent  -KR      Bathing 0-->independent  -SD  0-->independent  -KR      Dressing 0-->independent  -SD  0-->independent  -KR      Eating 0-->independent  -SD  0-->independent  -KR      Communication 0-->understands/communicates without difficulty  -SD  0-->understands/communicates without difficulty  -KR      Swallowing 0-->swallows foods/liquids without difficulty  -SD  0-->swallows foods/liquids without difficulty  -KR      Pain Assessment    Pain Assessment No/denies pain  -SD No/denies pain  -JR       Vision Assessment/Intervention    Visual Impairment WFL  -SD        Cognitive Assessment/Intervention    Current Cognitive/Communication Assessment functional  -SD functional  -JR       Orientation Status oriented x 4  -SD oriented x 4  -JR       Follows Commands/Answers Questions needs cueing  -SD able to follow multi-step instructions  -       Personal Safety decreased awareness, need for assist;decreased awareness, need for safety  -SD mild impairment  -JR       Personal Safety Interventions  fall prevention program maintained;gait belt  -JR       ROM (Range of Motion)    General ROM lower extremity range of motion deficits identified  -SD        General ROM Detail  Rt knee=5 to 80  -       MMT (Manual Muscle Testing)    General MMT Assessment upper extremity strength deficits identified;lower extremity strength deficits identified  -SD        General MMT Assessment Detail  Rt knee = 3/5  -       Bed Mobility, Assessment/Treatment    Bed Mobility, Assistive Device bed rails;head of bed elevated  -SD head of bed elevated;bed rails  -       Bed Mob, Supine to Sit, [place] minimum assist (75% patient effort)  -SD minimum assist (75% patient effort)  -       Bed Mob, Sit to Supine, [place] minimum assist (75% patient effort)  -SD minimum assist (75% patient effort)  -       Bed  Mobility, Safety Issues decreased use of legs for bridging/pushing  -SD        Bed Mobility, Impairments strength decreased  -SD strength decreased;ROM decreased;coordination impaired;motor control impaired  -JR       Transfer Assessment/Treatment    Transfers, Sit-Stand [place] minimum assist (75% patient effort)  -SD        Transfers, Stand-Sit [place] minimum assist (75% patient effort)  -SD minimum assist (75% patient effort)  -JR       Transfers, Sit-Stand-Sit, Assist Device rolling walker  -SD quad cane  -JR       Upper Body Bathing Assessment/Training    UB Bathing Assess/Train, [place] Level contact guard assist  -SD        Lower Body Bathing Assessment/Training    LB Bathing Assess/Train, [place] Level minimum assist (75% patient effort)  -SD        Upper Body Dressing Assessment/Training    UB Dressing Assess/Train, [place] contact guard assist  -SD        Lower Body Dressing Assessment/Training    LB Dressing Assess/Train, [place] minimum assist (75% patient effort)  -SD        Toileting Assessment/Training    Toileting Assess/Train, [place] Level minimum assist (75% patient effort)  -SD        Grooming Assessment/Training    Grooming Assess/Train, [place] Level set up required  -SD        Positioning and Restraints    Pre-Treatment Position in bed  -SD in bed  -JR       Post Treatment Position chair  -SD chair  -JR       In Chair reclined;call light within reach;encouraged to call for assist;with family/caregiver  -SD reclined;call light within reach;encouraged to call for assist;with family/caregiver  -JR         User Key  (r) = Recorded By, (t) = Taken By, (c) = Cosigned By    Initials Name Effective Dates    JR Betzaida Roy, PT 10/26/16 -     EDWIN Blackwell RN 11/07/16 -     ARMAND Ledesma OT 06/30/17 -            Occupational Therapy Education     Title: PT OT SLP Therapies (Active)     Topic: Occupational Therapy (Active)     Point: ADL training (Done)     Description: Instruct learner(s) on proper safety adaptation and remediation techniques during self care or transfers.   Instruct in proper use of assistive devices.    Learning Progress Summary    Learner Readiness Method Response Comment Documented by Status   Patient Acceptance E JEYSON  SD 08/01/17 1601 Done    Acceptance E JEYSON Educated patient on benefits of participating with OT services during her in-patient stay in order to increase strength, mobility and functional independence. SD 08/01/17 1601 Done                      User Key     Initials Effective Dates Name Provider Type Discipline    SD 06/30/17 -  Yeni Ledesma, OT Therapist OT                  OT Recommendation and Plan  Anticipated Discharge Disposition: home with home health  Therapy Frequency: 3-5 times/wk  Plan of Care Review  Plan Of Care Reviewed With: patient  Progress: no change  Outcome Summary/Follow up Plan: Patient seen for OT eval this date. Good participation noted. No c/o pain. Patient required Min A for transfers, bed mobility and LB self care; CGA for functional mobility using '. Patient is expected to benefit from OT services to increase strength, mobility and ADL performance.           OT Goals       08/01/17 1604          Strength OT LTG    Strength Goal OT LTG, Date Established 08/01/17  -SD      Strength Goal OT LTG, Time to Achieve 2 wks  -SD      Strength Goal OT LTG, Measure to Achieve Patient will perform 15 reps using red therband in order to increase strength for carryover with ADL tasks  -SD      Strength Goal OT LTG, Outcome goal ongoing  -SD      Patient Education OT LTG    Patient Education OT LTG, Date Established 08/01/17  -SD      Patient Education OT LTG, Time to Achieve 2 wks  -SD      Patient Education OT LTG, Education Type adaptive equipment mgmt  -SD      Patient Education OT LTG, Education Understanding independent  -SD      Patient Education OT LTG Outcome goal ongoing  -SD      Bathing OT LTG     Bathing Goal OT LTG, Date Established 08/01/17  -SD      Bathing Goal OT LTG, Time to Achieve 2 wks  -SD      Bathing Goal OT LTG, Activity Type lower body bathing  -SD      Bathing Goal OT LTG, Assist Device sponge, long handled  -SD      Bathing Goal OT LTG, Outcome goal ongoing  -SD      LB Dressing OT LTG    LB Dressing Goal OT LTG, Date Established 08/01/17  -SD      LB Dressing Goal OT LTG, Time to Achieve 2 wks  -SD      LB Dressing Goal OT LTG, [place] Level supervision required  -SD      LB Dressing Goal OT LTG, Adaptive Equipment reacher;sock-aid;shoe horn, long handled  -SD      LB Dressing Goal OT LTG, Outcome goal ongoing  -SD        User Key  (r) = Recorded By, (t) = Taken By, (c) = Cosigned By    Initials Name Provider Type    ARMAND Ledesma, OT Therapist                Outcome Measures       08/01/17 1510 08/01/17 1508       How much help from another person do you currently need...    Turning from your back to your side while in flat bed without using bedrails?  3  -JR     Moving from lying on back to sitting on the side of a flat bed without bedrails?  3  -JR     Moving to and from a bed to a chair (including a wheelchair)?  3  -JR     Standing up from a chair using your arms (e.g., wheelchair, bedside chair)?  3  -JR     Climbing 3-5 steps with a railing?  3  -JR     To walk in hospital room?  3  -JR     AM-PAC 6 Clicks Score  18  -JR     How much help from another is currently needed...    Putting on and taking off regular lower body clothing? 3  -SD      Bathing (including washing, rinsing, and drying) 3  -SD      Toileting (which includes using toilet bed pan or urinal) 3  -SD      Putting on and taking off regular upper body clothing 3  -SD      Taking care of personal grooming (such as brushing teeth) 4  -SD      Eating meals 4  -SD      Score 20  -SD      Functional Assessment    Outcome Measure Options AM-PAC 6 Clicks Daily Activity (OT)  -SD AM-PAC 6 Clicks Basic Mobility (PT)   -JR       User Key  (r) = Recorded By, (t) = Taken By, (c) = Cosigned By    Initials Name Provider Type    JR Betzaida Roy, PT Physical Therapist    SD Yeni Ledesma, OT Therapist          Time Calculation:   OT Start Time: 1510    Therapy Charges for Today     Code Description Service Date Service Provider Modifiers Qty    [id number]  OT EVAL LOW COMPLEXITY 4 8/1/2017 Yeni Ledesma OT GO 1               Yeni Ledesma OT  8/1/2017

## 2017-08-01 NOTE — ANESTHESIA PROCEDURE NOTES
Peripheral Block    Patient location during procedure: post-op  Start time: 8/1/2017 1:29 PM  Stop time: 8/1/2017 1:40 PM  Reason for block: at surgeon's request and post-op pain management  Performed by  CRNA: LEXY WHEELER  Preanesthetic Checklist  Completed: patient identified, site marked, surgical consent, pre-op evaluation, timeout performed, IV checked, risks and benefits discussed and monitors and equipment checked  Prep:  Sterile barriers:gloves, sterile barriers, cap and mask  Prep: ChloraPrep  Patient monitoring: continuous pulse oximetry, EKG and blood pressure monitoring  Procedure  Sedation:no  Guidance:ultrasound guided  Images:still images obtained    Laterality:rightInjection Technique:single-shot  Needle Type:echogenic  Needle Gauge:20 G    Medications  Comment:.iPak block with 20 ml 0.5% bupivacaine and Anterior Periosteal Infiltration of femur with 20 ml 0.5% bupivacaine  Local Injected:bupivacaine 0.25% Local Amount Injected:40 (20 ml NS and 20 ml 0.5% Bup)mL  Post Assessment  Injection Assessment: negative aspiration for heme, no paresthesia on injection and incremental injection  Patient Tolerance:comfortable throughout block  Complications:no  Additional Notes  iPak block done with leg elevated on blankets.  Time Out done at 1328

## 2017-08-01 NOTE — ANESTHESIA PROCEDURE NOTES
Spinal Block    Patient location during procedure: OR  Indication:procedure for pain  Performed By  CRNA: LEXY WHEELER  Preanesthetic Checklist  Completed: patient identified, site marked, surgical consent, pre-op evaluation, timeout performed, IV checked, risks and benefits discussed and monitors and equipment checked  Spinal Block Prep:  Patient Position:right lateral decubitus  Sterile Tech:cap, gloves, mask and sterile barriers  Prep:Chloraprep  Patient Monitoring:blood pressure monitoring, continuous pulse oximetry and EKG  Spinal Block Procedure  Approach:midline  Guidance:landmark technique and palpation technique  Location:L3-L4  Needle Type:Pencan  Needle Gauge:25 G  Placement of Spinal needle event:cerebrospinal fluid aspirated  Paresthesia: no  Fluid Appearance:clear  Post Assessment  Patient Tolerance:patient tolerated the procedure well with no apparent complications  Complications no  Additional Notes  Risks of spinal anesthesia discussed , including but not limited to:  Headache, itching, nausea and vomiting, infection, failure of the block, decreased BP, permanent chronic back pain, nerve damage, paralysis, etc.    Skin localized with lidocaine skin wheal.    SPINAL INJECTED INTRATHECALLY WITH    1.6 ml 0.75% Bupivacaine with dextrose  0.1 ml of Epinephrine 1:1000

## 2017-08-01 NOTE — INTERVAL H&P NOTE
"H&P reviewed. The patient was examined and there are no changes to the H&P.     /90 (BP Location: Left arm, Patient Position: Lying)  Pulse 85  Temp 98.3 °F (36.8 °C) (Temporal Artery )   Resp 18  Ht 65\" (165.1 cm)  Wt 156 lb (70.8 kg)  LMP  (LMP Unknown)  SpO2 97%  BMI 25.96 kg/m2       John Dewitt MD  8/1/2017  10:41 AM      "

## 2017-08-01 NOTE — CONSULTS
[facility] HOSPITALIST   CONSULTATION      Name:  Sahara Hardwick   Age:  66 y.o.  Sex:  female  :  1950  MRN:  [id number]   Visit Number:  [id number]  Admission Date:  2017  Date Of Service:  17  Primary Care Physician:  Suzanne Islas MD    Consulting Physician:    PRASAD Rosa    Referring Physician:  Dr. Dewitt      Reason For Consult:  Medical managment      Chief Complaint:   Right knee pain      History Of Presenting Illness:    This is a 66-year-old female with past medical history significant for arthritis, dyslipidemia, hypertension who presented to Dr. Dewitt with complaints of right knee pain.  According to the patient in a been increasingly worse she denied any injury or trauma.  She had tried rest and eyes as well as massage, brace, physical therapy and analgesics with little to no relief.  At the time of evaluation by Dr. Dewitt treatment options were discussed including a right total knee replacement.  At that time patient opted to undergo a right total knee replacement performed by Dr. Dewitt.  She was brought into [facility] today for the procedure for which she tolerated with no complications.  A consult was requested of the hospitalist service for postoperative medical management.  At the bedside in a chair.  Her pain is minimal at this time as she has been given analgesics.  PT OT has been consulted.  Patient was noted to have a home medication of Prograf and upon further questioning patient does take it for lichen planus and she uses it as a swish periodically every few weeks.  This is not a daily medication for this patient.      Review Of Systems:     General ROS: Patient denies any fevers, chills or loss of consciousness. Complains of generalized weakness.   Psychological ROS: Denies any hallucinations and delusions.  Ophthalmic ROS: Denies any diplopia or transient loss of vision.  ENT ROS: Denies sore throat, nasal congestion or  ear pain.   Allergy and Immunology ROS: Denies rash or itching.  Hematological and Lymphatic ROS: Denies neck swelling or easy bleeding.  Endocrine ROS: Denies any recent unintentional weight gain or loss.  Breast ROS: Denies any pain or swelling.  Respiratory ROS: Denies cough or shortness of breath.   Cardiovascular ROS: Denies chest pain or palpitations. No history of exertional chest pain.   Gastrointestinal ROS: Denies nausea and vomiting. Denies any abdominal pain. No diarrhea.   Genito-Urinary ROS: Denies dysuria or hematuria.  Musculoskeletal ROS: Complains of chronic back pain. No muscle pain. No calf pain. Neurological ROS: Denies any focal weakness. No loss of consciousness. Denies any numbness. Denies neck pain.   Dermatological ROS: Denies any redness or pruritis.     Past Medical History:    Past Medical History:   Diagnosis Date   • Arthritis    • Diverticulitis    • GERD (gastroesophageal reflux disease)    • High cholesterol    • Hypertension        Past Surgical history:    Past Surgical History:   Procedure Laterality Date   • GALLBLADDER SURGERY  1991   • HAND SURGERY  2015    right middle finger arthritis nodule removed.   • HYSTERECTOMY  1991       Social History:    Social History     Social History   • Marital status:      Spouse name: N/A   • Number of children: N/A   • Years of education: N/A     Occupational History   • retired      Social History Main Topics   • Smoking status: Never Smoker   • Smokeless tobacco: Never Used   • Alcohol use No   • Drug use: No   • Sexual activity: Defer     Other Topics Concern   • Not on file     Social History Narrative       Family History:    Family History   Problem Relation Age of Onset   • Osteoarthritis Mother    • Heart block Mother    • Cancer Mother      breast   • Heart block Father    • Diabetes Father    • Hyperlipidemia Father    • Cancer Father      colon   • Coronary artery disease Other        Allergies:      Milk-related compounds  and Latex    Home Medications:    Prior to Admission Medications     Prescriptions Last Dose Informant Patient Reported? Taking?    aspirin 81 MG EC tablet Past Week Self Yes Yes    Take 81 mg by mouth Daily.    calcium carbonate (TUMS) 500 MG chewable tablet Past Week Self Yes Yes    Chew 1 tablet Daily.    cholecalciferol (VITAMIN D3) 1000 UNITS tablet Past Week Self Yes Yes    Take 1,000 Units by mouth Daily.    cimetidine (TAGAMET) 200 MG tablet Past Week Self Yes Yes    Take 200 mg by mouth Daily As Needed.    lisinopril (PRINIVIL,ZESTRIL) 10 MG tablet 7/31/2017 Self Yes Yes    Take 10 mg by mouth Daily.    Multiple Vitamins-Minerals (MULTIVITAMIN ADULT PO) Past Week Self Yes Yes    Take 1 tablet by mouth Daily.    naproxen sodium (ALEVE) 220 MG tablet Past Week Self Yes Yes    Take 220 mg by mouth Daily.    Omega-3 Fatty Acids (FISH OIL) 1200 MG capsule delayed-release Past Week Self Yes Yes    Take 1 capsule by mouth Daily.    simvastatin (ZOCOR) 40 MG tablet 7/31/2017 Self Yes Yes    Take 40 mg by mouth Every Night.    omeprazole (priLOSEC) 20 MG capsule More than a month Self Yes No    Take 20 mg by mouth Daily.    tacrolimus (PROGRAF) 1 MG capsule More than a month Self Yes No    Take 1 mg by mouth 4 (Four) Times a Day.             Hospital Scheduled Meds:      aspirin 81 mg Oral Daily   atorvastatin 20 mg Oral Nightly   calcium carbonate 1 tablet Oral Daily   ceFAZolin      ceFAZolin 1 g Intravenous Q8H   ceFAZolin      cholecalciferol 1,000 Units Oral Daily   [START ON 8/2/2017] fondaparinux 2.5 mg Subcutaneous Q24H   lisinopril 10 mg Oral Nightly   multivitamin with minerals 1 tablet Oral Daily   [START ON 8/2/2017] pantoprazole 40 mg Oral QAM         Bupivacaine HCl-NaCl (PF) 10 mL/hr Last Rate: 10 mL/hr (08/01/17 1419)   lactated ringers 100 mL/hr         Vital Signs:    Temp:  [97.6 °F (36.4 °C)-98.3 °F (36.8 °C)] 97.6 °F (36.4 °C)  Heart Rate:  [60-85] 65  Resp:  [11-18] 14  BP: (113-146)/(61-90)  133/69    Last 3 weights    08/01/17  0934   Weight: 156 lb (70.8 kg)       Body mass index is 25.96 kg/(m^2).    Physical Exam:    General Appearance:    Alert and cooperative, not in any acute distress.   Head:    Atraumatic and normocephalic, without obvious abnormality.   Eyes:            PERRLA, conjunctivae and sclerae normal, no Icterus. No pallor. Extra-occular movements are within normal limits.   Ears:    Ears appear intact with no abnormalities noted.   Throat:   No oral lesions, no thrush, oral mucosa moist.   Neck:   Supple, trachea midline, no thyromegaly, no carotid bruit.   Back:     No kyphoscoliosis present. No tenderness to palpation,   range of motion normal.   Lungs:     Chest shape is normal. Breath sounds heard bilaterally equally.  No crackles or wheezing. No Pleural rub or bronchial breathing.      Heart:    Normal S1 and S2, no murmur, no gallop, no rub. No JVD   Abdomen:     Normal bowel sounds, no masses, no organomegaly. Soft        non-tender, non-distended, no guarding, no rebound                tenderness   Extremities:   Moves all extremities well, no edema, no cyanosis, no             clubbing   Pulses:   Pulses palpable and equal bilaterally   Skin:   No bleeding, bruising or rash, no skin breakdown, pink, warm and dry   Lymph nodes:   No palpable adenopathy   Neurologic:   Cranial nerves 2 - 12 grossly intact, sensation intact, Motor power is normal and equal bilaterally.           Labs:    Lab Results (last 24 hours)     Procedure Component Value Units Date/Time    Tissue Pathology Exam [[id number]] Collected:  08/01/17 1132    Specimen:  Bone from Knee, Right Updated:  08/01/17 1500          Radiology:    Imaging Results (last 72 hours)     Procedure Component Value Units Date/Time    XR Knee 1 or 2 View Right [[id number]] Collected:  08/01/17 1427     Updated:  08/01/17 1431    Narrative:       PROCEDURE: XR KNEE 1 OR 2 VW RIGHT-     HISTORY: post op R TKR; M17.11-Unilateral  primary osteoarthritis, right  knee; M25.561-Pain in right knee; G89.29-Other chronic pain;  Z01.818-Encounter for other preprocedural examination; Z01.818-Encounter  for other preprocedural examination; E83.19-Other disorders of iron  metabolism; E83.19-Other disorders of iron metabolism     COMPARISON: None.     FINDINGS:  A 2 view exam demonstrates total right knee arthroplasty.   There are no hardware complications.  There are no fractures. The  expected postoperative fluid and gas is seen within the joint.       Impression:       Status post total right knee arthroplasty with no hardware  complications.                 This report was finalized on 8/1/2017 2:29 PM by Jose Alberto Gonsales M.D..          Assessment and Recommendations/Plan:   1.  Chronic essential hypertension continue home antihypertensive medications    2.  Right total knee replacement-patient on analgesics, DVT prophylaxis, PT OT has been consulted and case management for discharge planning      Oksana Montgomery, PRASAD  08/01/17  3:51 PM

## 2017-08-02 LAB
ANION GAP SERPL CALCULATED.3IONS-SCNC: 14.4 MMOL/L
BASOPHILS # BLD AUTO: 0.02 10*3/MM3 (ref 0–0.2)
BASOPHILS NFR BLD AUTO: 0.1 % (ref 0–2.5)
BUN BLD-MCNC: 12 MG/DL (ref 7–20)
BUN/CREAT SERPL: 17.1 (ref 7.1–23.5)
CALCIUM SPEC-SCNC: 9 MG/DL (ref 8.4–10.2)
CHLORIDE SERPL-SCNC: 104 MMOL/L (ref 98–107)
CO2 SERPL-SCNC: 29 MMOL/L (ref 26–30)
CREAT BLD-MCNC: 0.7 MG/DL (ref 0.6–1.3)
DEPRECATED RDW RBC AUTO: 45.7 FL (ref 37–54)
EOSINOPHIL # BLD AUTO: 0.01 10*3/MM3 (ref 0–0.7)
EOSINOPHIL NFR BLD AUTO: 0.1 % (ref 0–7)
ERYTHROCYTE [DISTWIDTH] IN BLOOD BY AUTOMATED COUNT: 13.5 % (ref 11.5–14.5)
GFR SERPL CREATININE-BSD FRML MDRD: 84 ML/MIN/1.73
GLUCOSE BLD-MCNC: 118 MG/DL (ref 74–98)
HCT VFR BLD AUTO: 40.1 % (ref 37–47)
HGB BLD-MCNC: 12.8 G/DL (ref 12–16)
IMM GRANULOCYTES # BLD: 0.07 10*3/MM3 (ref 0–0.06)
IMM GRANULOCYTES NFR BLD: 0.5 % (ref 0–0.6)
LYMPHOCYTES # BLD AUTO: 2.25 10*3/MM3 (ref 0.6–3.4)
LYMPHOCYTES NFR BLD AUTO: 16.4 % (ref 10–50)
MCH RBC QN AUTO: 29.2 PG (ref 27–31)
MCHC RBC AUTO-ENTMCNC: 31.9 G/DL (ref 30–37)
MCV RBC AUTO: 91.3 FL (ref 81–99)
MONOCYTES # BLD AUTO: 1.64 10*3/MM3 (ref 0–0.9)
MONOCYTES NFR BLD AUTO: 12 % (ref 0–12)
NEUTROPHILS # BLD AUTO: 9.73 10*3/MM3 (ref 2–6.9)
NEUTROPHILS NFR BLD AUTO: 70.9 % (ref 37–80)
NRBC BLD MANUAL-RTO: 0 /100 WBC (ref 0–0)
PLATELET # BLD AUTO: 310 10*3/MM3 (ref 130–400)
PMV BLD AUTO: 9.4 FL (ref 6–12)
POTASSIUM BLD-SCNC: 5.4 MMOL/L (ref 3.5–5.1)
RBC # BLD AUTO: 4.39 10*6/MM3 (ref 4.2–5.4)
SODIUM BLD-SCNC: 142 MMOL/L (ref 137–145)
WBC NRBC COR # BLD: 13.72 10*3/MM3 (ref 4.8–10.8)

## 2017-08-02 PROCEDURE — 97110 THERAPEUTIC EXERCISES: CPT

## 2017-08-02 PROCEDURE — 25010000002 FONDAPARINUX PER 0.5 MG: Performed by: ORTHOPAEDIC SURGERY

## 2017-08-02 PROCEDURE — 99024 POSTOP FOLLOW-UP VISIT: CPT | Performed by: PHYSICIAN ASSISTANT

## 2017-08-02 PROCEDURE — 97530 THERAPEUTIC ACTIVITIES: CPT

## 2017-08-02 PROCEDURE — 99232 SBSQ HOSP IP/OBS MODERATE 35: CPT | Performed by: NURSE PRACTITIONER

## 2017-08-02 PROCEDURE — 85025 COMPLETE CBC W/AUTO DIFF WBC: CPT | Performed by: ORTHOPAEDIC SURGERY

## 2017-08-02 PROCEDURE — 80048 BASIC METABOLIC PNL TOTAL CA: CPT | Performed by: ORTHOPAEDIC SURGERY

## 2017-08-02 PROCEDURE — 97116 GAIT TRAINING THERAPY: CPT

## 2017-08-02 PROCEDURE — 25010000002 MORPHINE PER 10 MG: Performed by: ORTHOPAEDIC SURGERY

## 2017-08-02 RX ORDER — SODIUM POLYSTYRENE SULFONATE 15 G/60ML
15 SUSPENSION ORAL; RECTAL ONCE
Status: COMPLETED | OUTPATIENT
Start: 2017-08-02 | End: 2017-08-02

## 2017-08-02 RX ORDER — HYDROCODONE BITARTRATE AND ACETAMINOPHEN 7.5; 325 MG/1; MG/1
1 TABLET ORAL EVERY 6 HOURS PRN
Qty: 40 TABLET | Refills: 0 | Status: SHIPPED | OUTPATIENT
Start: 2017-08-02 | End: 2017-08-14 | Stop reason: SDUPTHER

## 2017-08-02 RX ADMIN — SODIUM CHLORIDE, POTASSIUM CHLORIDE, SODIUM LACTATE AND CALCIUM CHLORIDE 100 ML/HR: 600; 310; 30; 20 INJECTION, SOLUTION INTRAVENOUS at 04:46

## 2017-08-02 RX ADMIN — PANTOPRAZOLE SODIUM 40 MG: 40 TABLET, DELAYED RELEASE ORAL at 06:52

## 2017-08-02 RX ADMIN — MORPHINE SULFATE 4 MG: 4 INJECTION, SOLUTION INTRAMUSCULAR; INTRAVENOUS at 20:05

## 2017-08-02 RX ADMIN — MULTIPLE VITAMINS W/ MINERALS TAB 1 TABLET: TAB at 08:58

## 2017-08-02 RX ADMIN — MORPHINE SULFATE 4 MG: 4 INJECTION, SOLUTION INTRAMUSCULAR; INTRAVENOUS at 12:11

## 2017-08-02 RX ADMIN — MORPHINE SULFATE 4 MG: 4 INJECTION, SOLUTION INTRAMUSCULAR; INTRAVENOUS at 02:54

## 2017-08-02 RX ADMIN — HYDROCODONE BITARTRATE AND ACETAMINOPHEN 1 TABLET: 7.5; 325 TABLET ORAL at 04:46

## 2017-08-02 RX ADMIN — VITAMIN D, TAB 1000IU (100/BT) 1000 UNITS: 25 TAB at 09:00

## 2017-08-02 RX ADMIN — CEFAZOLIN 1 G: 1 INJECTION, POWDER, FOR SOLUTION INTRAMUSCULAR; INTRAVENOUS; PARENTERAL at 02:56

## 2017-08-02 RX ADMIN — Medication: at 15:39

## 2017-08-02 RX ADMIN — ATORVASTATIN CALCIUM 20 MG: 20 TABLET, FILM COATED ORAL at 20:05

## 2017-08-02 RX ADMIN — FONDAPARINUX SODIUM 2.5 MG: 2.5 INJECTION, SOLUTION SUBCUTANEOUS at 08:59

## 2017-08-02 RX ADMIN — Medication 10 ML: at 08:58

## 2017-08-02 RX ADMIN — MORPHINE SULFATE 4 MG: 4 INJECTION, SOLUTION INTRAMUSCULAR; INTRAVENOUS at 00:57

## 2017-08-02 RX ADMIN — HYDROCODONE BITARTRATE AND ACETAMINOPHEN 1 TABLET: 7.5; 325 TABLET ORAL at 08:59

## 2017-08-02 RX ADMIN — CALCIUM CARBONATE 1 TABLET: 500 TABLET, CHEWABLE ORAL at 08:58

## 2017-08-02 RX ADMIN — SODIUM CHLORIDE, POTASSIUM CHLORIDE, SODIUM LACTATE AND CALCIUM CHLORIDE 100 ML/HR: 600; 310; 30; 20 INJECTION, SOLUTION INTRAVENOUS at 19:05

## 2017-08-02 RX ADMIN — MORPHINE SULFATE 4 MG: 4 INJECTION, SOLUTION INTRAMUSCULAR; INTRAVENOUS at 06:52

## 2017-08-02 RX ADMIN — ASPIRIN 81 MG: 81 TABLET, COATED ORAL at 08:58

## 2017-08-02 RX ADMIN — SODIUM POLYSTYRENE SULFONATE 15 G: 15 SUSPENSION ORAL; RECTAL at 13:38

## 2017-08-02 NOTE — PROGRESS NOTES
[facility]    Nerve Cath Post Op Call    Patient Name: Sahara Hardwick  :  1950  MRN:  [id number]  Date of Discharge:     Nerve Cath Post Op Call:    Analgesia:Good  Pain Score:2/10  Side Effects:None  Patient Controlled ON Q pump infusion rate: 8ml/hr

## 2017-08-02 NOTE — THERAPY TREATMENT NOTE
Acute Care - Physical Therapy Treatment Note  [facility]     Patient Name: Sahara Hardwick  : 1950  MRN: [id number]  Today's Date: 2017  Onset of Illness/Injury or Date of Surgery Date: 17  Date of Referral to PT: 17  Referring Physician: Dr. Dewitt    Admit Date: 2017    Visit Dx:    ICD-10-CM ICD-9-CM   1. Impaired functional mobility, balance, gait, and endurance Z74.09 V49.89   2. Primary osteoarthritis of right knee M17.11 715.16   3. Chronic pain of right knee M25.561 719.46    G89.29 338.29   4. Pre-op testing Z01.818 V72.84   5. Pre-op evaluation Z01.818 V72.84   6. Other disorders of iron metabolism  E83.19 275.09   7. Other disorders of iron metabolism E83.19 275.09   8. Impaired mobility and ADLs Z74.09 799.89     Patient Active Problem List   Diagnosis   • Primary osteoarthritis of right knee               Adult Rehabilitation Note       17 1357 17 1032 17 1002    Rehab Assessment/Intervention    Discipline physical therapy assistant  -RM occupational therapist  - physical therapy assistant  -    Document Type therapy note (daily note)  - therapy note (daily note)  - therapy note (daily note)  -    Subjective Information agree to therapy;complains of;fatigue;pain  -RM agree to therapy;complains of;pain  - agree to therapy;complains of;pain  -RM    Patient Effort, Rehab Treatment good  -RM good  - good  -RM    Symptoms Noted During/After Treatment   none  -RM    Precautions/Limitations fall precautions  -RM fall precautions  - fall precautions  -RM    Recorded by [] Derrek Parekh PTA [] Regla Yin [RM] Derrek Parekh PTA    Pain Assessment    Pain Assessment 0-10  -RM 0-10  -AH 0-10  -RM    Pain Score 6  -RM 4  -AH 4  -RM    Post Pain Score 7  -RM 4  -AH 4  -RM    Pain Type Acute pain;Surgical pain  -RM Acute pain;Surgical pain  -AH Acute pain;Surgical pain  -RM    Pain Location Knee  -RM Knee  -AH Knee  -RM    Pain Orientation  Right  -RM Right  -AH Right  -RM    Recorded by [] Derrek Parekh PTA [] Regla Yin [] Derrek Parekh PTA    Bed Mobility, Assessment/Treatment    Bed Mobility, Assistive Device   bed rails;head of bed elevated  -RM    Bed Mob, Supine to Sit, [place] verbal cues required;conditional independence  -RM  verbal cues required;set up required;contact guard assist  -RM    Bed Mob, Sit to Supine, [place] verbal cues required;conditional independence  -RM  verbal cues required;set up required;contact guard assist  -RM    Bed Mobility, Safety Issues   decreased use of legs for bridging/pushing  -RM    Bed Mobility, Impairments   strength decreased;pain;ROM decreased  -RM    Recorded by [] Derrek Parekh PTA  [] Derrek Parekh PTA    Transfer Assessment/Treatment    Transfers, Sit-Stand [place] stand by assist;contact guard assist  -RM contact guard assist  -AH verbal cues required;contact guard assist  -RM    Transfers, Stand-Sit [place] stand by assist;contact guard assist  -RM contact guard assist  -AH verbal cues required;contact guard assist  -RM    Transfers, Sit-Stand-Sit, Assist Device rolling walker  -RM rolling walker  -AH rolling walker  -RM    Toilet Transfer, [place]  contact guard assist  -AH     Toilet Transfer, Assistive Device  elevated toilet seat;rolling walker  -AH     Recorded by [] Derrek Parekh PTA [] Regla Yin [] Derrek Parekh PTA    Gait Assessment/Treatment    Gait, [place] Level stand by assist;contact guard assist  -RM  contact guard assist  -RM    Gait, Assistive Device rolling walker  -RM  rolling walker  -RM    Gait, Distance (Feet) 412  -RM  392  -RM    Gait, Gait Pattern Analysis swing-through gait  -RM  swing-through gait  -RM    Gait, Gait Deviations toe-to-floor clearance decreased;antalgic  -RM  antalgic;toe-to-floor clearance decreased  -RM    Gait, Maintain Weight Bearing Status   able to maintain weight bearing  status  -RM    Gait, Impairments strength decreased;pain  -RM  strength decreased;ROM decreased;pain  -RM    Recorded by [] Derrek Parekh PTA  [] Derrek Parekh PTA    Functional Mobility    Functional Mobility- Ind. Level  contact guard assist  -     Functional Mobility- Device  rolling walker  -     Functional Mobility-Distance (Feet)  396  -     Recorded by  [] Regla Yin     Toileting Assessment/Training    Toileting Assess/Train, Assistive Device  raised toilet seat  -     Toileting Assess/Train, Position  sitting  -     Toileting Assess/Train, Indepen Level  supervision required  -     Recorded by  [] Regla Yin     Therapy Exercises    Right Lower Extremity   AAROM:;10 reps;supine;ankle pumps/circles;glut sets;heel slides;hip abduction/adduction;SAQ;SLR;quad sets  -    Bilateral Upper Extremity  AROM:;15 reps;elbow flexion/extension;shoulder extension/flexion;shoulder horizontal abd/add;shoulder ER/IR  -     BUE Resistance  theraband  -     Recorded by  [] Regla Yin [] Derrek Parekh PTA    Positioning and Restraints    Pre-Treatment Position in bed  -RM in bed  - in bed  -    Post Treatment Position bed  -RM chair  - chair  -RM    In Bed supine;call light within reach;encouraged to call for assist;notified nsg  -RM      In Chair  reclined;call light within reach;encouraged to call for assist;with family/caregiver  - reclined;call light within reach;encouraged to call for assist;with family/caregiver;notified nsg  -RM    Recorded by [] Derrek Parekh PTA [] Regla Yin [] Derrek Parekh PTA      User Key  (r) = Recorded By, (t) = Taken By, (c) = Cosigned By    Initials Name Effective Dates     Regla Yin 10/26/16 -     JUAN Parekh PTA 10/26/16 -                 IP PT Goals       08/02/17 1641 08/02/17 1600 08/01/17 1600    Bed Mobility PT LTG    Bed Mobility PT LTG, Date Established   08/01/17  -JR    Bed Mobility PT LTG,  Time to Achieve   2 wks  -JR    Bed Mobility PT LTG, Activity Type   all bed mobility  -JR    Bed Mobility PT LTG, [place] Level   conditional independence  -JR    Bed Mobility PT Goal  LTG, Assist Device   bed rails  -JR    Bed Mobility PT LTG, Outcome goal met  -RM goal ongoing  -RM     Transfer Training PT LTG    Transfer Training PT LTG, Date Established   08/01/17  -JR    Transfer Training PT LTG, Time to Achieve   2 wks  -JR    Transfer Training PT LTG, Activity Type   bed to chair /chair to bed;sit to stand/stand to sit  -JR    Transfer Training PT LTG, [place] Level   supervision required  -JR    Transfer Training PT LTG, Assist Device   walker, rolling  -JR    Transfer Training PT LTG, Outcome goal partially met  -RM goal partially met  -RM     Gait Training PT LTG    Gait Training Goal PT LTG, Date Established   08/01/17  -JR    Gait Training Goal PT LTG, Time to Achieve   2 wks  -JR    Gait Training Goal PT LTG, [place] Level   supervision required  -JR    Gait Training Goal PT LTG, Assist Device   walker, rolling  -JR    Gait Training Goal PT LTG, Distance to Achieve   400  -JR    Gait Training Goal PT LTG, Additional Goal   with good posture and even step lengths  -JR    Gait Training Goal PT LTG, Outcome goal partially met  -RM goal partially met  -RM     Strength Goal PT LTG    Strength Goal PT LTG, Date Established   08/01/17  -JR    Strength Goal PT LTG, Time to Achieve   2 wks  -JR    Strength Goal PT LTG, Measure to Achieve   perform exercises x 20 reps  -JR    Strength Goal PT LTG, Outcome goal ongoing  -RM goal ongoing  -RM       User Key  (r) = Recorded By, (t) = Taken By, (c) = Cosigned By    Initials Name Provider Type    JR Betzaida Roy, PT Physical Therapist    JUAN Parekh PTA Physical Therapy Assistant          Physical Therapy Education     Title: PT OT SLP Therapies (Active)     Topic: Physical Therapy (Done)     Point: Mobility training (Done)    Learning Progress  Summary    Learner Readiness Method Response Comment Documented by Status   Patient Acceptance E,TB VU,DU   08/02/17 1646 Done    Acceptance E,D,H VU,DU,NR Exercise tech as well as written HEP copy to pt and copy placed in chart,  08/02/17 1555 Done    Acceptance E VU Role of PT/POC  08/01/17 1558 Done   Significant Other Acceptance E VU Role of PT/POC  08/01/17 1558 Done               Point: Home exercise program (Done)    Learning Progress Summary    Learner Readiness Method Response Comment Documented by Status   Patient Acceptance E,D,H VU,DU,NR Exercise tech as well as written HEP copy to pt and copy placed in chart,  08/02/17 1555 Done               Point: Body mechanics (Done)    Learning Progress Summary    Learner Readiness Method Response Comment Documented by Status   Patient Acceptance E,D,H VU,DU,NR Exercise tech as well as written HEP copy to pt and copy placed in chart,  08/02/17 1555 Done               Point: Precautions (Done)    Learning Progress Summary    Learner Readiness Method Response Comment Documented by Status   Patient Acceptance E,D,H VU,DU,NR Exercise tech as well as written HEP copy to pt and copy placed in chart,  08/02/17 1555 Done                      User Key     Initials Effective Dates Name Provider Type Discipline     10/26/16 -  Betzaida Roy, PT Physical Therapist PT     10/26/16 -  Derrek Parekh PTA Physical Therapy Assistant PT                    PT Recommendation and Plan  Anticipated Discharge Disposition: home with home health  Planned Therapy Interventions: strengthening, transfer training, balance training, bed mobility training, joint mobilization, patient/family education  PT Frequency: 2 times/day, daily (daily sat/sun)  Plan of Care Review  Plan Of Care Reviewed With: patient  Progress: improving  Outcome Summary/Follow up Plan: Pt tolerated increased ambulation distance with decreased antagia as well as decreased assistance with bed mobility  requiring verbal cues only.            Outcome Measures       08/02/17 1357 08/02/17 1032 08/01/17 1510    How much help from another person do you currently need...    Turning from your back to your side while in flat bed without using bedrails? 4  -RM      Moving from lying on back to sitting on the side of a flat bed without bedrails? 4  -RM      Moving to and from a bed to a chair (including a wheelchair)? 3  -RM      Standing up from a chair using your arms (e.g., wheelchair, bedside chair)? 3  -RM      Climbing 3-5 steps with a railing? 3  -RM      To walk in hospital room? 3  -RM      AM-PAC 6 Clicks Score 20  -RM      How much help from another is currently needed...    Putting on and taking off regular lower body clothing?  3  -AH 3  -SD    Bathing (including washing, rinsing, and drying)  3  -AH 3  -SD    Toileting (which includes using toilet bed pan or urinal)  3  -AH 3  -SD    Putting on and taking off regular upper body clothing  4  -AH 3  -SD    Taking care of personal grooming (such as brushing teeth)  4  -AH 4  -SD    Eating meals  4  - 4  -SD    Score  21  -AH 20  -SD    Functional Assessment    Outcome Measure Options AM-PAC 6 Clicks Basic Mobility (PT)  -RM  AM-PAC 6 Clicks Daily Activity (OT)  -SD      08/01/17 1508          How much help from another person do you currently need...    Turning from your back to your side while in flat bed without using bedrails? 3  -JR      Moving from lying on back to sitting on the side of a flat bed without bedrails? 3  -JR      Moving to and from a bed to a chair (including a wheelchair)? 3  -JR      Standing up from a chair using your arms (e.g., wheelchair, bedside chair)? 3  -JR      Climbing 3-5 steps with a railing? 3  -JR      To walk in hospital room? 3  -JR      AM-PAC 6 Clicks Score 18  -JR      Functional Assessment    Outcome Measure Options AM-PAC 6 Clicks Basic Mobility (PT)  -JR        User Key  (r) = Recorded By, (t) = Taken By, (c) =  Cosigned By    Initials Name Provider Type     Regla Yin Occupational Therapist    JR Betzaida Roy, PT Physical Therapist    RM Derrek Parekh PTA Physical Therapy Assistant    ARMAND Ledesma, OT Therapist           Time Calculation:         PT Charges       08/02/17 1647 08/02/17 1604       Time Calculation    Start Time 1357  -RM 1002  -RM     PT Received On 08/02/17  -RM      PT Goal Re-Cert Due Date 08/11/17  -RM      Time Calculation- PT    Total Timed Code Minutes- PT 15 minute(s)  -RM 41 minute(s)  -RM       User Key  (r) = Recorded By, (t) = Taken By, (c) = Cosigned By    Initials Name Provider Type     Derrek Parekh PTA Physical Therapy Assistant          Therapy Charges for Today     Code Description Service Date Service Provider Modifiers Qty    47680777104 HC GAIT TRAINING EA 15 MIN 8/2/2017 Derrek Parekh, RODRIGUE GP 1    57964577963 HC PT THER PROC EA 15 MIN 8/2/2017 Derrek Parekh PTA GP 1    31321861706 HC GAIT TRAINING EA 15 MIN 8/2/2017 Derrek Parekh PTA GP 1          PT G-Codes  Outcome Measure Options: AM-PAC 6 Clicks Basic Mobility (PT)    Derrek Parekh PTA  8/2/2017

## 2017-08-02 NOTE — PROGRESS NOTES
PROGRESS NOTE        Date of Admission: 8/1/2017  Length of Stay: 1  Primary Care Physician: Suzanne Islas MD    Subjective   Chief Complaint: Right knee replacement  HPI:  Patient seen today.  She is sitting up to bedside in chair.  She denies any chest pain, shortness breath, nausea, vomiting.  She did undergo a right total knee replacement yesterday performed by Dr. Dewitt.  She has had no events overnight.  Her pain is adequately controlled at this time.  PT OT has been consulted.  Case management is also been consulted for discharge planning.  Patient is otherwise hemodynamically stable.     I have reviewed labs/imaging/records from this hospitalization, including ER staff and admitting/attending physicians H/P's and progress notes to establish a comprehensive understanding of this patient's clinical hospital course, as well as to establish a transition of care appropriately.        Review Of Systems:   Review of Systems   General ROS: Patient denies any fevers, chills or loss of consciousness. Complains of generalized weakness.   Psychological ROS: Denies any hallucinations and delusions.  Ophthalmic ROS: Denies any diplopia or transient loss of vision.  ENT ROS: Denies sore throat, nasal congestion or ear pain.   Allergy and Immunology ROS: Denies rash or itching.  Hematological and Lymphatic ROS: Denies neck swelling or easy bleeding.  Endocrine ROS: Denies any recent unintentional weight gain or loss.  Breast ROS: Denies any pain or swelling.  Respiratory ROS: Denies cough or shortness of breath.   Cardiovascular ROS: Denies chest pain or palpitations. No history of exertional chest pain.   Gastrointestinal ROS: Denies nausea and vomiting. Denies any abdominal pain. No diarrhea.  Genito-Urinary ROS: Denies dysuria or hematuria.  Musculoskeletal ROS: Denies back pain. No muscle pain. No calf pain.   Neurological ROS: Denies any focal weakness. No loss of consciousness. Denies any numbness. Denies neck pain.    Dermatological ROS: Denies any redness or pruritis.    Objective      Temp:  [97.6 °F (36.4 °C)-98.6 °F (37 °C)] 98.4 °F (36.9 °C)  Heart Rate:  [60-71] 63  Resp:  [11-20] 18  BP: (106-135)/(54-76) 106/54  Physical Exam    General Appearance:  Alert and cooperative, not in any acute distress.   Head:  Atraumatic and normocephalic, without obvious abnormality.   Eyes:          PERRLA, conjunctivae and sclerae normal, no Icterus. No pallor. Extra-occular movements are within normal limits.   Ears:  Ears appear intact with no abnormalities noted.   Throat: No oral lesions, no thrush, oral mucosa moist.   Neck: Supple, trachea midline, no thyromegaly, no carotid bruit.   Back:   No kyphoscoliosis present. No tenderness to palpation,   range of motion normal.   Lungs:   Chest shape is normal. Breath sounds heard bilaterally equally.  No crackles or wheezing. No Pleural rub or bronchial breathing.   Heart:  Normal S1 and S2, no murmur, no gallop, no rub. No JVD   Abdomen:   Normal bowel sounds, no masses, no organomegaly. Soft     non-tender, non-distended, no guarding, no rebound                tenderness   Extremities: Moves all extremities well, no edema, no cyanosis, no clubbing.  Dressing right knee clean and dry   Pulses: Pulses palpable and equal bilaterally   Skin: No bleeding, bruising or rash   Lymph nodes: No palpable adenopathy   Neurologic:    Psychiatric/Behavior:     Cranial nerves 2 - 12 grossly intact, sensation intact, Motor power is normal and equal bilaterally.  Mood normal, behavior normal       Results Review:    I have reviewed the labs, radiology results and diagnostic studies.      Results from last 7 days  Lab Units 08/02/17  0453   WBC 10*3/mm3 13.72*   HEMOGLOBIN g/dL 12.8   PLATELETS 10*3/mm3 310       Results from last 7 days  Lab Units 08/02/17  0453   SODIUM mmol/L 142   POTASSIUM mmol/L 5.4*   CO2 mmol/L 29.0   CREATININE mg/dL 0.70   GLUCOSE mg/dL 118*       Culture Data:    Radiology  Data:   Cardiology Data:    I have reviewed the medications.    Assessment/Plan     Assessment and Plan:  1.  Chronic essential hypertension continue home antihypertensive medications-  BP is stable.  Continue to monitor.       2.  Right total knee replacement-patient on analgesics, DVT prophylaxis, PT OT has been consulted and case management for discharge planning    3.  Hyperkalemia-  Hold Lisinopril.  Give dose of Kayexalate. Recheck in am.             DVT prophylaxis:  Arixtra  Discharge Planning:   PRASAD Rosa 08/02/17 12:08 PM

## 2017-08-02 NOTE — PROGRESS NOTES
Discharge Planning Assessment   Jacob     Patient Name: Sahara Hardwick  MRN: [id number]  Today's Date: 8/2/2017    Admit Date: 8/1/2017          Discharge Needs Assessment       08/02/17 0950    Living Environment    Provides Primary Care For no one    Quality Of Family Relationships supportive    Discharge Needs Assessment    Concerns To Be Addressed no discharge needs identified    Readmission Within The Last 30 Days no previous admission in last 30 days    Anticipated Changes Related to Illness none    Equipment Needed After Discharge walker, rolling;commode            Discharge Plan       08/02/17 0966    Case Management/Social Work Plan    Plan Spoke to pt in room, lives in ranch house with her , has a cane but no other medical equipment, is independent with all ADLS, plans to go home at discharge, will need  a walker with wheels, and a BSC.  Will also need  Home Health.  Chose from list [facility] and [facility] for DME. Plans to discharge tomorrow.  Cm will continue to follow.        Discharge Placement     No information found                Demographic Summary       08/02/17 0949    Referral Information    Admission Type inpatient    Record Reviewed history and physical            Functional Status       08/02/17 0928    IADL    Medications independent    Meal Preparation independent    Housekeeping independent    Laundry independent    Shopping independent    Oral Care independent    Cognitive/Perceptual/Developmental    Current Mental Status/Cognitive Functioning no deficits noted    Recent Changes in Mental Status/Cognitive Functioning no changes            Psychosocial     None            Abuse/Neglect     None            Legal     None            Substance Abuse     None            Patient Forms     None          Machelle Mark

## 2017-08-02 NOTE — PLAN OF CARE
Problem: Knee Replacement, Total (Adult)  Goal: Signs and Symptoms of Listed Potential Problems Will be Absent or Manageable (Knee Replacement, Total)  Outcome: Ongoing (interventions implemented as appropriate)    08/01/17 1561   Knee Replacement, Total   Problems Assessed (Total Knee Replacement) all   Problems Present (Total Knee Replacement) pain

## 2017-08-02 NOTE — PLAN OF CARE
Problem: Patient Care Overview (Adult)  Goal: Plan of Care Review  Outcome: Ongoing (interventions implemented as appropriate)    08/02/17 7142   Coping/Psychosocial Response Interventions   Plan Of Care Reviewed With patient;spouse       Goal: Adult Individualization and Mutuality  Outcome: Ongoing (interventions implemented as appropriate)  Goal: Discharge Needs Assessment  Outcome: Ongoing (interventions implemented as appropriate)    Problem: Perioperative Period (Adult)  Goal: Signs and Symptoms of Listed Potential Problems Will be Absent or Manageable (Perioperative Period)  Outcome: Ongoing (interventions implemented as appropriate)    Problem: Anesthesia/Analgesia, Neuraxial (Adult)  Goal: Signs and Symptoms of Listed Potential Problems Will be Absent or Manageable (Anesthesia/Analgesia, Neuraxial)  Outcome: Ongoing (interventions implemented as appropriate)    Problem: Knee Replacement, Total (Adult)  Goal: Signs and Symptoms of Listed Potential Problems Will be Absent or Manageable (Knee Replacement, Total)  Outcome: Ongoing (interventions implemented as appropriate)

## 2017-08-02 NOTE — PLAN OF CARE
Problem: Patient Care Overview (Adult)  Goal: Plan of Care Review  Outcome: Ongoing (interventions implemented as appropriate)    08/02/17 1641   Coping/Psychosocial Response Interventions   Plan Of Care Reviewed With patient   Outcome Evaluation   Outcome Summary/Follow up Plan Pt tolerated increased ambulation distance with decreased antagia as well as decreased assistance with bed mobility requiring verbal cues only.    Patient Care Overview   Progress improving         Problem: Inpatient Physical Therapy  Goal: Bed Mobility Goal LTG- PT  Outcome: Outcome(s) achieved Date Met:  08/02/17 08/01/17 1600 08/02/17 1641   Bed Mobility PT LTG   Bed Mobility PT LTG, Date Established 08/01/17 --    Bed Mobility PT LTG, Time to Achieve 2 wks --    Bed Mobility PT LTG, Activity Type all bed mobility --    Bed Mobility PT LTG, [place] Level conditional independence --    Bed Mobility PT Goal LTG, Assist Device bed rails --    Bed Mobility PT LTG, Outcome --  goal met       Goal: Transfer Training Goal 1 LTG- PT  Outcome: Ongoing (interventions implemented as appropriate)    08/01/17 1600 08/02/17 1641   Transfer Training PT LTG   Transfer Training PT LTG, Date Established 08/01/17 --    Transfer Training PT LTG, Time to Achieve 2 wks --    Transfer Training PT LTG, Activity Type bed to chair /chair to bed;sit to stand/stand to sit --    Transfer Training PT LTG, [place] Level supervision required --    Transfer Training PT LTG, Assist Device walker, rolling --    Transfer Training PT LTG, Outcome --  goal partially met       Goal: Gait Training Goal LTG- PT  Outcome: Ongoing (interventions implemented as appropriate)    08/01/17 1600 08/02/17 1641   Gait Training PT LTG   Gait Training Goal PT LTG, Date Established 08/01/17 --    Gait Training Goal PT LTG, Time to Achieve 2 wks --    Gait Training Goal PT LTG, [place] Level supervision required --    Gait Training Goal PT LTG, Assist Device walker, rolling  --    Gait Training Goal PT LTG, Distance to Achieve 400 --    Gait Training Goal PT LTG, Additional Goal with good posture and even step lengths --    Gait Training Goal PT LTG, Outcome --  goal partially met       Goal: Strength Goal LTG- PT  Outcome: Ongoing (interventions implemented as appropriate)    08/01/17 1600 08/02/17 1641   Strength Goal PT LTG   Strength Goal PT LTG, Date Established 08/01/17 --    Strength Goal PT LTG, Time to Achieve 2 wks --    Strength Goal PT LTG, Measure to Achieve perform exercises x 20 reps --    Strength Goal PT LTG, Outcome --  goal ongoing

## 2017-08-02 NOTE — PLAN OF CARE
Problem: Patient Care Overview (Adult)  Goal: Plan of Care Review  Outcome: Ongoing (interventions implemented as appropriate)    08/02/17 1600   Coping/Psychosocial Response Interventions   Plan Of Care Reviewed With patient;spouse   Outcome Evaluation   Outcome Summary/Follow up Plan Pt participated well with therapy this date. Pt demonstrated improved ambulation toleranc ewith increased distance and quality . Pt required decreased assist with mobility this treatment Pt also performed HEP and was given copy as well as copy placed in chart.    Patient Care Overview   Progress improving         Problem: Inpatient Physical Therapy  Goal: Bed Mobility Goal LTG- PT  Outcome: Ongoing (interventions implemented as appropriate)    08/01/17 1600 08/02/17 1600   Bed Mobility PT LTG   Bed Mobility PT LTG, Date Established 08/01/17 --    Bed Mobility PT LTG, Time to Achieve 2 wks --    Bed Mobility PT LTG, Activity Type all bed mobility --    Bed Mobility PT LTG, [place] Level conditional independence --    Bed Mobility PT Goal LTG, Assist Device bed rails --    Bed Mobility PT LTG, Outcome --  goal ongoing       Goal: Transfer Training Goal 1 LTG- PT  Outcome: Ongoing (interventions implemented as appropriate)    08/01/17 1600 08/02/17 1600   Transfer Training PT LTG   Transfer Training PT LTG, Date Established 08/01/17 --    Transfer Training PT LTG, Time to Achieve 2 wks --    Transfer Training PT LTG, Activity Type bed to chair /chair to bed;sit to stand/stand to sit --    Transfer Training PT LTG, [place] Level supervision required --    Transfer Training PT LTG, Assist Device walker, rolling --    Transfer Training PT LTG, Outcome --  goal partially met       Goal: Gait Training Goal LTG- PT  Outcome: Ongoing (interventions implemented as appropriate)    08/01/17 1600 08/02/17 1600   Gait Training PT LTG   Gait Training Goal PT LTG, Date Established 08/01/17 --    Gait Training Goal PT LTG, Time to Achieve 2  wks --    Gait Training Goal PT LTG, [place] Level supervision required --    Gait Training Goal PT LTG, Assist Device walker, rolling --    Gait Training Goal PT LTG, Distance to Achieve 400 --    Gait Training Goal PT LTG, Additional Goal with good posture and even step lengths --    Gait Training Goal PT LTG, Outcome --  goal partially met       Goal: Strength Goal LTG- PT  Outcome: Ongoing (interventions implemented as appropriate)    08/01/17 1600 08/02/17 1600   Strength Goal PT LTG   Strength Goal PT LTG, Date Established 08/01/17 --    Strength Goal PT LTG, Time to Achieve 2 wks --    Strength Goal PT LTG, Measure to Achieve perform exercises x 20 reps --    Strength Goal PT LTG, Outcome --  goal ongoing

## 2017-08-02 NOTE — PLAN OF CARE
Problem: Patient Care Overview (Adult)  Goal: Plan of Care Review  Outcome: Ongoing (interventions implemented as appropriate)    08/02/17 1522   Coping/Psychosocial Response Interventions   Plan Of Care Reviewed With patient;spouse   Outcome Evaluation   Outcome Summary/Follow up Plan Pt able to walk 396' with cga using RW. Pt able to manage clothing and toileting hygiene tasks with supervision for safety. Pt educated on BUE strengthening ex and was given a HEP which she was able to verbalize/demonstrate independent understanding of ex.   Patient Care Overview   Progress improving         Problem: Inpatient Occupational Therapy  Goal: Strength Goal LTG- OT  Outcome: Outcome(s) achieved Date Met:  08/02/17 08/01/17 1604 08/02/17 1522   Strength OT LTG   Strength Goal OT LTG, Date Established 08/01/17 --    Strength Goal OT LTG, Time to Achieve 2 wks --    Strength Goal OT LTG, Measure to Achieve Patient will perform 15 reps using red therband in order to increase strength for carryover with ADL tasks --    Strength Goal OT LTG, Date Goal Reviewed --  08/02/17   Strength Goal OT LTG, Outcome --  goal met       Goal: Patient Education Goal LTG- OT  Outcome: Ongoing (interventions implemented as appropriate)    08/01/17 1604 08/02/17 1522   Patient Education OT LTG   Patient Education OT LTG, Date Established 08/01/17 --    Patient Education OT LTG, Time to Achieve 2 wks --    Patient Education OT LTG, Education Type adaptive equipment mgmt --    Patient Education OT LTG, Education Understanding independent --    Patient Education OT LTG, Date Goal Reviewed --  08/02/17   Patient Education OT LTG Outcome --  goal ongoing       Goal: Bathing Goal LTG- OT  Outcome: Ongoing (interventions implemented as appropriate)    08/01/17 1604 08/02/17 1522   Bathing OT LTG   Bathing Goal OT LTG, Date Established 08/01/17 --    Bathing Goal OT LTG, Time to Achieve 2 wks --    Bathing Goal OT LTG, Activity Type lower body bathing  --    Bathing Goal OT LTG, Assist Device sponge, long handled --    Bathing Goal OT LTG, Date Goal Reviewed --  08/02/17   Bathing Goal OT LTG, Outcome --  goal ongoing       Goal: LB Dressing Goal LTG- OT  Outcome: Ongoing (interventions implemented as appropriate)    08/01/17 1604 08/02/17 1522   LB Dressing OT LTG   LB Dressing Goal OT LTG, Date Established 08/01/17 --    LB Dressing Goal OT LTG, Time to Achieve 2 wks --    LB Dressing Goal OT LTG, [place] Level supervision required --    LB Dressing Goal OT LTG, Adaptive Equipment reacher;sock-aid;shoe horn, long handled --    LB Dressing Goal OT LTG, Date Goal Reviewed --  08/02/17   LB Dressing Goal OT LTG, Outcome --  goal ongoing

## 2017-08-02 NOTE — THERAPY TREATMENT NOTE
Acute Care - Physical Therapy Treatment Note  [facility]     Patient Name: Sahara Hardwick  : 1950  MRN: [id number]  Today's Date: 2017  Onset of Illness/Injury or Date of Surgery Date: 17  Date of Referral to PT: 17  Referring Physician: Dr. Dewitt    Admit Date: 2017    Visit Dx:    ICD-10-CM ICD-9-CM   1. Impaired functional mobility, balance, gait, and endurance Z74.09 V49.89   2. Primary osteoarthritis of right knee M17.11 715.16   3. Chronic pain of right knee M25.561 719.46    G89.29 338.29   4. Pre-op testing Z01.818 V72.84   5. Pre-op evaluation Z01.818 V72.84   6. Other disorders of iron metabolism  E83.19 275.09   7. Other disorders of iron metabolism E83.19 275.09   8. Impaired mobility and ADLs Z74.09 799.89     Patient Active Problem List   Diagnosis   • Primary osteoarthritis of right knee               Adult Rehabilitation Note       17 1032 17 1002       Rehab Assessment/Intervention    Discipline occupational therapist  - physical therapy assistant  -     Document Type therapy note (daily note)  - therapy note (daily note)  -     Subjective Information agree to therapy;complains of;pain  - agree to therapy;complains of;pain  -RM     Patient Effort, Rehab Treatment good  - good  -RM     Symptoms Noted During/After Treatment  none  -RM     Precautions/Limitations fall precautions  - fall precautions  -RM     Recorded by [] Regla Yin [] Derrek Parekh PTA     Pain Assessment    Pain Assessment 0-10  - 0-10  -RM     Pain Score 4  -AH 4  -RM     Post Pain Score 4  -AH 4  -RM     Pain Type Acute pain;Surgical pain  - Acute pain;Surgical pain  -RM     Pain Location Knee  - Knee  -RM     Pain Orientation Right  - Right  -RM     Recorded by [] Regla Yin [] Derrek Parekh PTA     Bed Mobility, Assessment/Treatment    Bed Mobility, Assistive Device  bed rails;head of bed elevated  -RM     Bed Mob, Supine to Sit, [place]   verbal cues required;set up required;contact guard assist  -RM     Bed Mob, Sit to Supine, [place]  verbal cues required;set up required;contact guard assist  -RM     Bed Mobility, Safety Issues  decreased use of legs for bridging/pushing  -RM     Bed Mobility, Impairments  strength decreased;pain;ROM decreased  -RM     Recorded by  [] Derrek Parekh, PTA     Transfer Assessment/Treatment    Transfers, Sit-Stand [place] contact guard assist  -AH verbal cues required;contact guard assist  -RM     Transfers, Stand-Sit [place] contact guard assist  -AH verbal cues required;contact guard assist  -RM     Transfers, Sit-Stand-Sit, Assist Device rolling walker  - rolling walker  -RM     Toilet Transfer, [place] contact guard assist  -AH      Toilet Transfer, Assistive Device elevated toilet seat;rolling walker  -      Recorded by [] Regla Yin [] Derrek Parekh, PTA     Gait Assessment/Treatment    Gait, [place] Level  contact guard assist  -RM     Gait, Assistive Device  rolling walker  -RM     Gait, Distance (Feet)  392  -     Gait, Gait Pattern Analysis  swing-through gait  -     Gait, Gait Deviations  antalgic;toe-to-floor clearance decreased  -     Gait, Maintain Weight Bearing Status  able to maintain weight bearing status  -RM     Gait, Impairments  strength decreased;ROM decreased;pain  -RM     Recorded by  [] Derrek Parekh, PTA     Functional Mobility    Functional Mobility- Ind. Level contact guard assist  -      Functional Mobility- Device rolling walker  -      Functional Mobility-Distance (Feet) 396  -      Recorded by [] Relga Yin      Toileting Assessment/Training    Toileting Assess/Train, Assistive Device raised toilet seat  -      Toileting Assess/Train, Position sitting  -      Toileting Assess/Train, Indepen Level supervision required  -      Recorded by [] Regla Yin      Therapy Exercises    Right Lower Extremity  AAROM:;10  reps;supine;ankle pumps/circles;glut sets;heel slides;hip abduction/adduction;SAQ;SLR;quad sets  -RM     Bilateral Upper Extremity AROM:;15 reps;elbow flexion/extension;shoulder extension/flexion;shoulder horizontal abd/add;shoulder ER/IR  -      BUE Resistance theraband  -      Recorded by [] Regla Yin [] Derrek Parekh PTA     Positioning and Restraints    Pre-Treatment Position in bed  - in bed  -     Post Treatment Position chair  - chair  -RM     In Chair reclined;call light within reach;encouraged to call for assist;with family/caregiver  - reclined;call light within reach;encouraged to call for assist;with family/caregiver;notified nsg  -     Recorded by [] Regla Yin [] Derrek Parekh PTA       User Key  (r) = Recorded By, (t) = Taken By, (c) = Cosigned By    Initials Name Effective Dates     Regla Yin 10/26/16 -     RM Derrek Parekh PTA 10/26/16 -                 IP PT Goals       08/02/17 1600 08/01/17 1600       Bed Mobility PT LTG    Bed Mobility PT LTG, Date Established  08/01/17  -JR     Bed Mobility PT LTG, Time to Achieve  2 wks  -JR     Bed Mobility PT LTG, Activity Type  all bed mobility  -JR     Bed Mobility PT LTG, [place] Level  conditional independence  -JR     Bed Mobility PT Goal  LTG, Assist Device  bed rails  -JR     Bed Mobility PT LTG, Outcome goal ongoing  -RM      Transfer Training PT LTG    Transfer Training PT LTG, Date Established  08/01/17  -JR     Transfer Training PT LTG, Time to Achieve  2 wks  -JR     Transfer Training PT LTG, Activity Type  bed to chair /chair to bed;sit to stand/stand to sit  -JR     Transfer Training PT LTG, [place] Level  supervision required  -JR     Transfer Training PT LTG, Assist Device  walker, rolling  -JR     Transfer Training PT LTG, Outcome goal partially met  -RM      Gait Training PT LTG    Gait Training Goal PT LTG, Date Established  08/01/17  -JR     Gait Training Goal PT LTG, Time to  Achieve  2 wks  -JR     Gait Training Goal PT LTG, [place] Level  supervision required  -JR     Gait Training Goal PT LTG, Assist Device  walker, rolling  -JR     Gait Training Goal PT LTG, Distance to Achieve  400  -JR     Gait Training Goal PT LTG, Additional Goal  with good posture and even step lengths  -JR     Gait Training Goal PT LTG, Outcome goal partially met  -RM      Strength Goal PT LTG    Strength Goal PT LTG, Date Established  08/01/17  -JR     Strength Goal PT LTG, Time to Achieve  2 wks  -JR     Strength Goal PT LTG, Measure to Achieve  perform exercises x 20 reps  -JR     Strength Goal PT LTG, Outcome goal ongoing  -RM        User Key  (r) = Recorded By, (t) = Taken By, (c) = Cosigned By    Initials Name Provider Type    JR Betzaida Roy, PT Physical Therapist    RM Derrek Parekh, PTA Physical Therapy Assistant          Physical Therapy Education     Title: PT OT SLP Therapies (Active)     Topic: Physical Therapy (Done)     Point: Mobility training (Done)    Learning Progress Summary    Learner Readiness Method Response Comment Documented by Status   Patient Acceptance E,D,H VU,DU,NR Exercise tech as well as written HEP copy to pt and copy placed in chart,  08/02/17 1555 Done    Acceptance E VU Role of PT/POC  08/01/17 1558 Done   Significant Other Acceptance E VU Role of PT/POC  08/01/17 1558 Done               Point: Home exercise program (Done)    Learning Progress Summary    Learner Readiness Method Response Comment Documented by Status   Patient Acceptance E,D,H VU,DU,NR Exercise tech as well as written HEP copy to pt and copy placed in chart,  08/02/17 1555 Done               Point: Body mechanics (Done)    Learning Progress Summary    Learner Readiness Method Response Comment Documented by Status   Patient Acceptance E,D,H VU,DU,NR Exercise tech as well as written HEP copy to pt and copy placed in chart,  08/02/17 1555 Done               Point: Precautions (Done)    Learning  Progress Summary    Learner Readiness Method Response Comment Documented by Status   Patient Acceptance E,D,H VU,DU,NR Exercise tech as well as written HEP copy to pt and copy placed in chart,  08/02/17 5163 Done                      User Key     Initials Effective Dates Name Provider Type Discipline    JR 10/26/16 -  Betzaida Roy, PT Physical Therapist PT     10/26/16 -  Derrek Parekh, PTA Physical Therapy Assistant PT                    PT Recommendation and Plan  Anticipated Discharge Disposition: home with home health  Planned Therapy Interventions: strengthening, transfer training, balance training, bed mobility training, joint mobilization, patient/family education  PT Frequency: 2 times/day, daily (daily sat/sun)  Plan of Care Review  Plan Of Care Reviewed With: patient, spouse  Progress: improving  Outcome Summary/Follow up Plan: Pt participated well with therapy this date. Pt demonstrated improved ambulation toleranc ewith increased distance and quality . Pt required decreased assist with mobility this treatment Pt also performed HEP and was given copy as well as copy placed in chart.            Outcome Measures       08/02/17 1032 08/01/17 1510 08/01/17 1508    How much help from another person do you currently need...    Turning from your back to your side while in flat bed without using bedrails?   3  -JR    Moving from lying on back to sitting on the side of a flat bed without bedrails?   3  -JR    Moving to and from a bed to a chair (including a wheelchair)?   3  -JR    Standing up from a chair using your arms (e.g., wheelchair, bedside chair)?   3  -JR    Climbing 3-5 steps with a railing?   3  -JR    To walk in hospital room?   3  -JR    AM-PAC 6 Clicks Score   18  -JR    How much help from another is currently needed...    Putting on and taking off regular lower body clothing? 3  -AH 3  -SD     Bathing (including washing, rinsing, and drying) 3  -AH 3  -SD     Toileting (which includes using  toilet bed pan or urinal) 3  -AH 3  -SD     Putting on and taking off regular upper body clothing 4  -AH 3  -SD     Taking care of personal grooming (such as brushing teeth) 4  -AH 4  -SD     Eating meals 4  -AH 4  -SD     Score 21  -AH 20  -SD     Functional Assessment    Outcome Measure Options  AM-PAC 6 Clicks Daily Activity (OT)  -SD AM-PAC 6 Clicks Basic Mobility (PT)  -JR      User Key  (r) = Recorded By, (t) = Taken By, (c) = Cosigned By    Initials Name Provider Type     Regla Yin Occupational Therapist    JR Betzaida Roy, PT Physical Therapist    SD Yeni Ledesma, OT Therapist           Time Calculation:         PT Charges       08/02/17 1604          Time Calculation    Start Time 1002  -RM      Time Calculation- PT    Total Timed Code Minutes- PT 41 minute(s)  -RM        User Key  (r) = Recorded By, (t) = Taken By, (c) = Cosigned By    Initials Name Provider Type     Derrek Parekh PTA Physical Therapy Assistant          Therapy Charges for Today     Code Description Service Date Service Provider Modifiers Qty    04607760234 HC GAIT TRAINING EA 15 MIN 8/2/2017 Derrek Parekh PTA GP 1    93322270339 HC PT THER PROC EA 15 MIN 8/2/2017 Derrek Parekh PTA GP 1          PT G-Codes  Outcome Measure Options: AM-PAC 6 Clicks Daily Activity (OT)    Derrek Parekh PTA  8/2/2017

## 2017-08-02 NOTE — PROGRESS NOTES
[facility]  PROGRESS NOTE    Name:  Sahara Hardwick   Age:  66 y.o.  Sex:  female  :  1950  MRN:  [id number]   Visit Number:  [id number]  Admission Date:  2017  9:06 AM  Date Of Service:  17  Primary Care Physician:  Suzanne Islas MD     LOS: 1 day :  Patient Care Team:  Suzanne Islas MD as PCP - General (Internal Medicine):    Chief Complaint:      Patient offers no complaints    Subjective / Interval History:     Patient is status post day #1 right total knee arthroplasty.  Patient has tried numerous conservative measures to treat her right knee osteoarthritis pain with no success.  Patient is sitting upright in the bed offering no complaints.  She denies chest pain or shortness of breath.    Review of Systems:     General ROS: No fever spike, no chills or loss of consciousness.  Ophthalmic ROS: no acute visual disturbance.  ENT ROS: No sinus congestion or sore throat.   Respiratory ROS: No shortness of breath.   Cardiovascular ROS: No chest pain or palpitations.  Gastrointestinal ROS: No acute abdominal change. Non-distended.  Genito-Urinary ROS: No reported dysuria or hematuria.  Musculoskeletal ROS: No deep calf pain. No acute focal motor deficit  Neurological ROS: No cyanosis, no new numbness or tingling.  Dermatological ROS: No erythema.  No rash or pruritis.    Vital Signs:    Temp:  [97.6 °F (36.4 °C)-98.6 °F (37 °C)] 98.4 °F (36.9 °C)  Heart Rate:  [60-71] 63  Resp:  [11-20] 18  BP: (106-135)/(54-76) 106/54    Intake and output:    I/O last 3 completed shifts:  In: 4160 [P.O.:360; I.V.:3800]  Out: 600 [Urine:550; Blood:50]  I/O this shift:  In: 240 [P.O.:240]  Out: 200 [Urine:200]    Physical Examination:    General Appearance:    Alert and cooperative, no acute distress.   Head:    Atraumatic and normocephalic, without obvious abnormality.   Eyes:    PERRLA.  Extraocular movements are within normal limits.   ENT:   No acute change.   Neck:   Supple, trachea midline.   Lungs:      Normal respirations, unlabored.    Heart:    Regular rate.   Abdomen:     Soft non-tender, non-distended.   Extremities:   No new motor deficit, no calf pain, Castillo sign negative.   Skin:     No rash.  No cyanosis.  No erythema.  No active drainage.  With contact precautions, sterile dressing change done.   Neurologic:   Sensation intact, pulses intact.     Laboratory results:    Lab Results (last 24 hours)     Procedure Component Value Units Date/Time    Tissue Pathology Exam [[id number]] Collected:  08/01/17 1132    Specimen:  Bone from Knee, Right Updated:  08/01/17 1500    CBC & Differential [[id number]] Collected:  08/02/17 0453    Specimen:  Blood Updated:  08/02/17 0633    Narrative:       The following orders were created for panel order CBC & Differential.  Procedure                               Abnormality         Status                     ---------                               -----------         ------                     CBC Auto Differential[[id number]]        Abnormal            Final result                 Please view results for these tests on the individual orders.    CBC Auto Differential [[id number]]  (Abnormal) Collected:  08/02/17 0453    Specimen:  Blood Updated:  08/02/17 0633     WBC 13.72 (H) 10*3/mm3      RBC 4.39 10*6/mm3      Hemoglobin 12.8 g/dL      Hematocrit 40.1 %      MCV 91.3 fL      MCH 29.2 pg      MCHC 31.9 g/dL      RDW 13.5 %      RDW-SD 45.7 fl      MPV 9.4 fL      Platelets 310 10*3/mm3      Neutrophil % 70.9 %      Lymphocyte % 16.4 %      Monocyte % 12.0 %      Eosinophil % 0.1 %      Basophil % 0.1 %      Immature Grans % 0.5 %      Neutrophils, Absolute 9.73 (H) 10*3/mm3      Lymphocytes, Absolute 2.25 10*3/mm3      Monocytes, Absolute 1.64 (H) 10*3/mm3      Eosinophils, Absolute 0.01 10*3/mm3      Basophils, Absolute 0.02 10*3/mm3      Immature Grans, Absolute 0.07 (H) 10*3/mm3      nRBC 0.0 /100 WBC     Basic Metabolic Panel [[id number]]  (Abnormal) Collected:   08/02/17 0453    Specimen:  Blood Updated:  08/02/17 0639     Glucose 118 (H) mg/dL      BUN 12 mg/dL      Creatinine 0.70 mg/dL      Sodium 142 mmol/L      Potassium 5.4 (H) mmol/L      Chloride 104 mmol/L      CO2 29.0 mmol/L      Calcium 9.0 mg/dL      eGFR Non African Amer 84 mL/min/1.73      BUN/Creatinine Ratio 17.1     Anion Gap 14.4 mmol/L     Narrative:       Abnormal estimated GFR should be followed by more specific studies to confirm end stage chronic renal disease. The equation used for calculation may not be accurate for patients less than 19 years old, greater than 70 years old, patients at extremes of weight, malnutrition, or with acute renal dysfunction.          I have reviewed the patient's laboratory results.    Radiology results:    Imaging Results (last 24 hours)     Procedure Component Value Units Date/Time    XR Knee 1 or 2 View Right [[id number]] Collected:  08/01/17 1427     Updated:  08/01/17 1431    Narrative:       PROCEDURE: XR KNEE 1 OR 2 VW RIGHT-     HISTORY: post op R TKR; M17.11-Unilateral primary osteoarthritis, right  knee; M25.561-Pain in right knee; G89.29-Other chronic pain;  Z01.818-Encounter for other preprocedural examination; Z01.818-Encounter  for other preprocedural examination; E83.19-Other disorders of iron  metabolism; E83.19-Other disorders of iron metabolism     COMPARISON: None.     FINDINGS:  A 2 view exam demonstrates total right knee arthroplasty.   There are no hardware complications.  There are no fractures. The  expected postoperative fluid and gas is seen within the joint.       Impression:       Status post total right knee arthroplasty with no hardware  complications.                 This report was finalized on 8/1/2017 2:29 PM by Jose Alberto Gonsales M.D..          I have reviewed the patient's radiology reports.    AP and lateral taken in recovery room shows no acute concern.  Good position and alignment of prosthesis and/or hardware.        Assessment/Plan    Active Problems:    Primary osteoarthritis of right knee      Plan:  S/P Right TKA    Continue current management per the detailed orders and instructions, including skin, safety and fall precautions, respiratory therapy with incentive spirometer, advance diet as tolerated, bowel regimen, multi-modal analgesia, multi-modal DVT prophylaxis, Hospitalist consult, PT/OT following post-surgical rehab protocol, and Case Management for discharge plans.  Patient states she anticipates discharge home with home health.  She states she will require a walker with wheels as well as a bedside commode.    Jose Wisdom PA-C  08/02/17  12:45 PM

## 2017-08-02 NOTE — THERAPY TREATMENT NOTE
Acute Care - Occupational Therapy Treatment Note  [facility]     Patient Name: Sahara Hardwick  : 1950  MRN: [id number]  Today's Date: 2017  Onset of Illness/Injury or Date of Surgery Date: 17  Date of Referral to OT: 17  Referring Physician: Dr. Dewitt      Admit Date: 2017    Visit Dx:     ICD-10-CM ICD-9-CM   1. Impaired functional mobility, balance, gait, and endurance Z74.09 V49.89   2. Primary osteoarthritis of right knee M17.11 715.16   3. Chronic pain of right knee M25.561 719.46    G89.29 338.29   4. Pre-op testing Z01.818 V72.84   5. Pre-op evaluation Z01.818 V72.84   6. Other disorders of iron metabolism  E83.19 275.09   7. Other disorders of iron metabolism E83.19 275.09   8. Impaired mobility and ADLs Z74.09 799.89     Patient Active Problem List   Diagnosis   • Primary osteoarthritis of right knee             Adult Rehabilitation Note       17 1032          Rehab Assessment/Intervention    Discipline occupational therapist  -      Document Type therapy note (daily note)  -      Subjective Information agree to therapy;complains of;pain  -      Patient Effort, Rehab Treatment good  -      Precautions/Limitations fall precautions  -      Recorded by [] Regla Yin      Pain Assessment    Pain Assessment 0-10  -      Pain Score 4  -      Post Pain Score 4  -      Pain Type Acute pain;Surgical pain  -      Pain Location Knee  -      Pain Orientation Right  -      Recorded by [] Regla Yin      Transfer Assessment/Treatment    Transfers, Sit-Stand [place] contact guard assist  -      Transfers, Stand-Sit [place] contact guard assist  -      Transfers, Sit-Stand-Sit, Assist Device rolling walker  -      Toilet Transfer, [place] contact guard assist  -      Toilet Transfer, Assistive Device elevated toilet seat;rolling walker  -      Recorded by [] Regla Yin      Functional Mobility    Functional Mobility- Ind. Level  contact guard assist  -      Functional Mobility- Device rolling walker  -      Functional Mobility-Distance (Feet) 396  -      Recorded by [] Regla Yin      Toileting Assessment/Training    Toileting Assess/Train, Assistive Device raised toilet seat  -      Toileting Assess/Train, Position sitting  -      Toileting Assess/Train, Indepen Level supervision required  -      Recorded by [] Regla Yin      Therapy Exercises    Bilateral Upper Extremity AROM:;15 reps;elbow flexion/extension;shoulder extension/flexion;shoulder horizontal abd/add;shoulder ER/IR  -      BUE Resistance theraband  -      Recorded by [] Regla Yin      Positioning and Restraints    Pre-Treatment Position in bed  -      Post Treatment Position chair  -      In Chair reclined;call light within reach;encouraged to call for assist;with family/caregiver  -      Recorded by [] Regla Yin        User Key  (r) = Recorded By, (t) = Taken By, (c) = Cosigned By    Initials Name Effective Dates     Regla Yin 10/26/16 -                 OT Goals       08/02/17 1522 08/01/17 1604       Strength OT LTG    Strength Goal OT LTG, Date Established  08/01/17  -SD     Strength Goal OT LTG, Time to Achieve  2 wks  -SD     Strength Goal OT LTG, Measure to Achieve  Patient will perform 15 reps using red therband in order to increase strength for carryover with ADL tasks  -SD     Strength Goal OT LTG, Date Goal Reviewed 08/02/17  -      Strength Goal OT LTG, Outcome goal met  - goal ongoing  -SD     Patient Education OT LTG    Patient Education OT LTG, Date Established  08/01/17  -SD     Patient Education OT LTG, Time to Achieve  2 wks  -SD     Patient Education OT LTG, Education Type  adaptive equipment mgmt  -SD     Patient Education OT LTG, Education Understanding  independent  -SD     Patient Education OT LTG, Date Goal Reviewed 08/02/17  -      Patient Education OT LTG Outcome goal ongoing  - goal ongoing   -SD     Bathing OT LTG    Bathing Goal OT LTG, Date Established  08/01/17  -SD     Bathing Goal OT LTG, Time to Achieve  2 wks  -SD     Bathing Goal OT LTG, Activity Type  lower body bathing  -SD     Bathing Goal OT LTG, Assist Device  sponge, long handled  -SD     Bathing Goal OT LTG, Date Goal Reviewed 08/02/17  -AH      Bathing Goal OT LTG, Outcome goal ongoing  -AH goal ongoing  -SD     LB Dressing OT LTG    LB Dressing Goal OT LTG, Date Established  08/01/17  -SD     LB Dressing Goal OT LTG, Time to Achieve  2 wks  -SD     LB Dressing Goal OT LTG, [place] Level  supervision required  -SD     LB Dressing Goal OT LTG, Adaptive Equipment  reacher;sock-aid;shoe horn, long handled  -SD     LB Dressing Goal OT LTG, Date Goal Reviewed 08/02/17  -AH      LB Dressing Goal OT LTG, Outcome goal ongoing  -AH goal ongoing  -SD       User Key  (r) = Recorded By, (t) = Taken By, (c) = Cosigned By    Initials Name Provider Type     Regla Yin Occupational Therapist    ARMAND Ledesma, OT Therapist          Occupational Therapy Education     Title: PT OT SLP Therapies (Active)     Topic: Occupational Therapy (Active)     Point: ADL training (Done)    Description: Instruct learner(s) on proper safety adaptation and remediation techniques during self care or transfers.   Instruct in proper use of assistive devices.    Learning Progress Summary    Learner Readiness Method Response Comment Documented by Status   Patient Acceptance E,H,UBALDO CEJA Pt educated on safety with toileting tasks.  Pt educated on BUE strengthening ex and was given HEP handout along with red theraband.  08/02/17 1521 Done    Acceptance E VU  SD 08/01/17 1601 Done    Acceptance E VU Educated patient on benefits of participating with OT services during her in-patient stay in order to increase strength, mobility and functional independence. SD 08/01/17 1601 Done   Family Acceptance TONNYH,UBALDO CEJA Pt educated on safety with toileting tasks.  Pt educated  on BUE strengthening ex and was given HEP handout along with red theraband.  08/02/17 1521 Done               Point: Home exercise program (Done)    Description: Instruct learner(s) on appropriate technique for monitoring, assisting and/or progressing therapeutic exercises/activities.    Learning Progress Summary    Learner Readiness Method Response Comment Documented by Status   Patient Acceptance KARLOS LANCASTER,VIKRAM BENÍTEZ,UBALDO Pt educated on safety with toileting tasks.  Pt educated on BUE strengthening ex and was given HEP handout along with red theraband.  08/02/17 1521 Done   Family Acceptance E,H,VIKRAM BENÍTEZ,UBALDO Pt educated on safety with toileting tasks.  Pt educated on BUE strengthening ex and was given HEP handout along with red theraband.  08/02/17 1521 Done                      User Key     Initials Effective Dates Name Provider Type Discipline     10/26/16 -  Regla Yin Occupational Therapist OT    SD 06/30/17 -  Yeni Ledesma OT Therapist OT                  OT Recommendation and Plan  Anticipated Discharge Disposition: home with home health  Therapy Frequency: 3-5 times/wk  Plan of Care Review  Plan Of Care Reviewed With: patient, spouse  Progress: improving  Outcome Summary/Follow up Plan: Pt able to walk 396' with cga using RW.  Pt able to manage clothing and toileting hygiene tasks with supervision for safety.  Pt educated on BUE strengthening ex and was given a HEP which she was able to verbalize/demonstrate independent understanding of ex.        Outcome Measures       08/02/17 1032 08/01/17 1510 08/01/17 1508    How much help from another person do you currently need...    Turning from your back to your side while in flat bed without using bedrails?   3  -JR    Moving from lying on back to sitting on the side of a flat bed without bedrails?   3  -JR    Moving to and from a bed to a chair (including a wheelchair)?   3  -JR    Standing up from a chair using your arms (e.g., wheelchair, bedside chair)?   3  -JR     Climbing 3-5 steps with a railing?   3  -JR    To walk in hospital room?   3  -JR    AM-PAC 6 Clicks Score   18  -JR    How much help from another is currently needed...    Putting on and taking off regular lower body clothing? 3  -AH 3  -SD     Bathing (including washing, rinsing, and drying) 3  -AH 3  -SD     Toileting (which includes using toilet bed pan or urinal) 3  -AH 3  -SD     Putting on and taking off regular upper body clothing 4  -AH 3  -SD     Taking care of personal grooming (such as brushing teeth) 4  -AH 4  -SD     Eating meals 4  -AH 4  -SD     Score 21  -AH 20  -SD     Functional Assessment    Outcome Measure Options  AM-PAC 6 Clicks Daily Activity (OT)  -SD AM-PAC 6 Clicks Basic Mobility (PT)  -      User Key  (r) = Recorded By, (t) = Taken By, (c) = Cosigned By    Initials Name Provider Type     Regla Yin Occupational Therapist    JR Betzaida Roy, PT Physical Therapist    ARMAND Ledesma, OT Therapist           Time Calculation:         Time Calculation- OT       08/02/17 1525          Time Calculation- OT    OT Start Time 1032  -      Total Timed Code Minutes- OT 28 minute(s)  -      OT Received On 08/02/17  -      OT Goal Re-Cert Due Date 08/11/17  -        User Key  (r) = Recorded By, (t) = Taken By, (c) = Cosigned By    Initials Name Provider Type     Regla Yin Occupational Therapist           Therapy Charges for Today     Code Description Service Date Service Provider Modifiers Qty    33494692861 HC OT THER PROC EA 15 MIN 8/2/2017 Regla Yin GO 1    [id number]  OT THERAPEUTIC ACT EA 15 MIN 8/2/2017 Regla Yin GO 1               Regla Yin  8/2/2017

## 2017-08-03 VITALS
RESPIRATION RATE: 16 BRPM | BODY MASS INDEX: 30.04 KG/M2 | WEIGHT: 180.3 LBS | TEMPERATURE: 99.5 F | HEIGHT: 65 IN | OXYGEN SATURATION: 95 % | DIASTOLIC BLOOD PRESSURE: 74 MMHG | HEART RATE: 73 BPM | SYSTOLIC BLOOD PRESSURE: 158 MMHG

## 2017-08-03 LAB
ANION GAP SERPL CALCULATED.3IONS-SCNC: 14.4 MMOL/L
BUN BLD-MCNC: 10 MG/DL (ref 7–20)
BUN/CREAT SERPL: 16.7 (ref 7.1–23.5)
CALCIUM SPEC-SCNC: 8.5 MG/DL (ref 8.4–10.2)
CHLORIDE SERPL-SCNC: 101 MMOL/L (ref 98–107)
CO2 SERPL-SCNC: 29 MMOL/L (ref 26–30)
CREAT BLD-MCNC: 0.6 MG/DL (ref 0.6–1.3)
DEPRECATED RDW RBC AUTO: 45.8 FL (ref 37–54)
ERYTHROCYTE [DISTWIDTH] IN BLOOD BY AUTOMATED COUNT: 13.8 % (ref 11.5–14.5)
GFR SERPL CREATININE-BSD FRML MDRD: 100 ML/MIN/1.73
GLUCOSE BLD-MCNC: 112 MG/DL (ref 74–98)
HCT VFR BLD AUTO: 37.4 % (ref 37–47)
HGB BLD-MCNC: 11.9 G/DL (ref 12–16)
MCH RBC QN AUTO: 29 PG (ref 27–31)
MCHC RBC AUTO-ENTMCNC: 31.8 G/DL (ref 30–37)
MCV RBC AUTO: 91 FL (ref 81–99)
PLATELET # BLD AUTO: 292 10*3/MM3 (ref 130–400)
PMV BLD AUTO: 9.2 FL (ref 6–12)
POTASSIUM BLD-SCNC: 3.4 MMOL/L (ref 3.5–5.1)
RBC # BLD AUTO: 4.11 10*6/MM3 (ref 4.2–5.4)
SODIUM BLD-SCNC: 141 MMOL/L (ref 137–145)
WBC NRBC COR # BLD: 11.78 10*3/MM3 (ref 4.8–10.8)

## 2017-08-03 PROCEDURE — 85027 COMPLETE CBC AUTOMATED: CPT | Performed by: NURSE PRACTITIONER

## 2017-08-03 PROCEDURE — 99024 POSTOP FOLLOW-UP VISIT: CPT | Performed by: PHYSICIAN ASSISTANT

## 2017-08-03 PROCEDURE — 99232 SBSQ HOSP IP/OBS MODERATE 35: CPT | Performed by: NURSE PRACTITIONER

## 2017-08-03 PROCEDURE — 80048 BASIC METABOLIC PNL TOTAL CA: CPT | Performed by: NURSE PRACTITIONER

## 2017-08-03 PROCEDURE — 25010000002 FONDAPARINUX PER 0.5 MG: Performed by: ORTHOPAEDIC SURGERY

## 2017-08-03 PROCEDURE — 25010000002 MORPHINE PER 10 MG: Performed by: ORTHOPAEDIC SURGERY

## 2017-08-03 RX ORDER — LISINOPRIL 10 MG/1
10 TABLET ORAL NIGHTLY
Status: DISCONTINUED | OUTPATIENT
Start: 2017-08-03 | End: 2017-08-03 | Stop reason: HOSPADM

## 2017-08-03 RX ADMIN — PANTOPRAZOLE SODIUM 40 MG: 40 TABLET, DELAYED RELEASE ORAL at 06:00

## 2017-08-03 RX ADMIN — MULTIPLE VITAMINS W/ MINERALS TAB 1 TABLET: TAB at 08:39

## 2017-08-03 RX ADMIN — FONDAPARINUX SODIUM 2.5 MG: 2.5 INJECTION, SOLUTION SUBCUTANEOUS at 08:39

## 2017-08-03 RX ADMIN — CALCIUM CARBONATE 1 TABLET: 500 TABLET, CHEWABLE ORAL at 08:39

## 2017-08-03 RX ADMIN — VITAMIN D, TAB 1000IU (100/BT) 1000 UNITS: 25 TAB at 09:40

## 2017-08-03 RX ADMIN — HYDROCODONE BITARTRATE AND ACETAMINOPHEN 1 TABLET: 7.5; 325 TABLET ORAL at 08:40

## 2017-08-03 RX ADMIN — SODIUM CHLORIDE, POTASSIUM CHLORIDE, SODIUM LACTATE AND CALCIUM CHLORIDE 100 ML/HR: 600; 310; 30; 20 INJECTION, SOLUTION INTRAVENOUS at 05:15

## 2017-08-03 RX ADMIN — MORPHINE SULFATE 4 MG: 4 INJECTION, SOLUTION INTRAMUSCULAR; INTRAVENOUS at 03:15

## 2017-08-03 RX ADMIN — ASPIRIN 81 MG: 81 TABLET, COATED ORAL at 08:39

## 2017-08-03 NOTE — DISCHARGE SUMMARY
Name:  Sahara Hardwick   Age:  66 y.o.  Sex:  female  :  1950  MRN:  [id number]   Visit Number:  [id number]  Primary Care Physician:  Suzanne Islas MD  Date of Discharge:  8/3/2017  Admission Date:  2017  9:06 AM      Discharge Diagnosis:     S/P Right knee replacement    Patient Active Problem List   Diagnosis   • Primary osteoarthritis of right knee       Presenting Problem/History of Present Illness:    Pre-op testing [Z01.818]  Other disorders of iron metabolism [E83.19]  Pre-op evaluation [Z01.818]  Primary osteoarthritis of right knee [M17.11]  Chronic pain of right knee [M25.561, G89.29]  Primary osteoarthritis of right knee [M17.11]         Consults:     Consults     Date and Time Order Name Status Description    2017 1502 Inpatient Consult to Hospitalist Completed           Procedures Performed:    Procedure(s):  Elective right total knee replacement (BIOMET)         History of presenting illness:    Ms. Hardwick is a pleasant 66-year-old  female who elected to proceed with right total knee arthroplasty secondary to osteoarthritis.  Patient has tried numerous conservative measures to treat her knee pain all without success.  Patient is status post day #2 right knee replacement.  Patient tolerated the surgery well.  No postop complications.    Patient is pleased with her progress thus far.  Patient denies chest pain, shortness of air, abdominal pain or dizziness.            Vital Signs:    Temp:  [98.3 °F (36.8 °C)-99.6 °F (37.6 °C)] 99.5 °F (37.5 °C)  Heart Rate:  [70-81] 73  Resp:  [14-18] 16  BP: (120-158)/(58-74) 158/74    Physical Exam:    General Appearance: alert, appears stated age and cooperative  Eyes: conjunctivae and sclerae normal  Nose: no drainage  Throat: oral mucosa moist  Lungs: respirations regular, respirations even and respirations unlabored  Heart: regular rhythm & normal rate  Abdomen: soft non-tender  Extremities: no edema, no cyanosis, no redness, Castillo's sign  negative, no ischemic changes, no ulcers and no clubbing  Pulses: Pulses palpable and equal bilaterally  Skin: turgor normal  Lymph Nodes: no palpable adenopathy  Neurologic: Mental Status orientated to person, place, time and situation  Psych: normal    Right knee incision is clean, dry and pristine. No signs of infection    Pertinent Lab Results:     Lab Results (all)     Procedure Component Value Units Date/Time    Tissue Pathology Exam [[id number]] Collected:  08/01/17 1132    Specimen:  Bone from Knee, Right Updated:  08/01/17 1500    CBC & Differential [[id number]] Collected:  08/02/17 0453    Specimen:  Blood Updated:  08/02/17 0633    Narrative:       The following orders were created for panel order CBC & Differential.  Procedure                               Abnormality         Status                     ---------                               -----------         ------                     CBC Auto Differential[[id number]]        Abnormal            Final result                 Please view results for these tests on the individual orders.    CBC Auto Differential [[id number]]  (Abnormal) Collected:  08/02/17 0453    Specimen:  Blood Updated:  08/02/17 0633     WBC 13.72 (H) 10*3/mm3      RBC 4.39 10*6/mm3      Hemoglobin 12.8 g/dL      Hematocrit 40.1 %      MCV 91.3 fL      MCH 29.2 pg      MCHC 31.9 g/dL      RDW 13.5 %      RDW-SD 45.7 fl      MPV 9.4 fL      Platelets 310 10*3/mm3      Neutrophil % 70.9 %      Lymphocyte % 16.4 %      Monocyte % 12.0 %      Eosinophil % 0.1 %      Basophil % 0.1 %      Immature Grans % 0.5 %      Neutrophils, Absolute 9.73 (H) 10*3/mm3      Lymphocytes, Absolute 2.25 10*3/mm3      Monocytes, Absolute 1.64 (H) 10*3/mm3      Eosinophils, Absolute 0.01 10*3/mm3      Basophils, Absolute 0.02 10*3/mm3      Immature Grans, Absolute 0.07 (H) 10*3/mm3      nRBC 0.0 /100 WBC     Basic Metabolic Panel [[id number]]  (Abnormal) Collected:  08/02/17 0453    Specimen:  Blood Updated:   08/02/17 0639     Glucose 118 (H) mg/dL      BUN 12 mg/dL      Creatinine 0.70 mg/dL      Sodium 142 mmol/L      Potassium 5.4 (H) mmol/L      Chloride 104 mmol/L      CO2 29.0 mmol/L      Calcium 9.0 mg/dL      eGFR Non African Amer 84 mL/min/1.73      BUN/Creatinine Ratio 17.1     Anion Gap 14.4 mmol/L     Narrative:       Abnormal estimated GFR should be followed by more specific studies to confirm end stage chronic renal disease. The equation used for calculation may not be accurate for patients less than 19 years old, greater than 70 years old, patients at extremes of weight, malnutrition, or with acute renal dysfunction.    CBC (No Diff) [[id number]]  (Abnormal) Collected:  08/03/17 0507    Specimen:  Blood Updated:  08/03/17 0554     WBC 11.78 (H) 10*3/mm3      RBC 4.11 (L) 10*6/mm3      Hemoglobin 11.9 (L) g/dL      Hematocrit 37.4 %      MCV 91.0 fL      MCH 29.0 pg      MCHC 31.8 g/dL      RDW 13.8 %      RDW-SD 45.8 fl      MPV 9.2 fL      Platelets 292 10*3/mm3     Basic Metabolic Panel [[id number]]  (Abnormal) Collected:  08/03/17 0507    Specimen:  Blood Updated:  08/03/17 0606     Glucose 112 (H) mg/dL      BUN 10 mg/dL      Creatinine 0.60 mg/dL      Sodium 141 mmol/L      Potassium 3.4 (L) mmol/L      Chloride 101 mmol/L      CO2 29.0 mmol/L      Calcium 8.5 mg/dL      eGFR Non African Amer 100 mL/min/1.73      BUN/Creatinine Ratio 16.7     Anion Gap 14.4 mmol/L     Narrative:       Abnormal estimated GFR should be followed by more specific studies to confirm end stage chronic renal disease. The equation used for calculation may not be accurate for patients less than 19 years old, greater than 70 years old, patients at extremes of weight, malnutrition, or with acute renal dysfunction.          Pertinent Radiology Results:    Imaging Results (all)     Procedure Component Value Units Date/Time    XR Knee 1 or 2 View Right [[id number]] Collected:  08/01/17 1427     Updated:  08/01/17 1431    Narrative:        PROCEDURE: XR KNEE 1 OR 2 VW RIGHT-     HISTORY: post op R TKR; M17.11-Unilateral primary osteoarthritis, right  knee; M25.561-Pain in right knee; G89.29-Other chronic pain;  Z01.818-Encounter for other preprocedural examination; Z01.818-Encounter  for other preprocedural examination; E83.19-Other disorders of iron  metabolism; E83.19-Other disorders of iron metabolism     COMPARISON: None.     FINDINGS:  A 2 view exam demonstrates total right knee arthroplasty.   There are no hardware complications.  There are no fractures. The  expected postoperative fluid and gas is seen within the joint.       Impression:       Status post total right knee arthroplasty with no hardware  complications.                 This report was finalized on 8/1/2017 2:29 PM by Jose Alberto Gonsales M.D..          Condition on Discharge:      Stable     Code status during the hospital stay:      Discharge Disposition:    Home-Health Care [facility]    Discharge Medication:     Sahara Hardwick   Home Medication Instructions LUC:[id number]    Printed on:08/03/17 1223   Medication Information                      calcium carbonate (TUMS) 500 MG chewable tablet  Chew 1 tablet Daily.             cholecalciferol (VITAMIN D3) 1000 UNITS tablet  Take 1,000 Units by mouth Daily.             cimetidine (TAGAMET) 200 MG tablet  Take 200 mg by mouth Daily As Needed.             enoxaparin (LOVENOX) 30 MG/0.3ML solution syringe  Inject contents of 1 syringe under the skin Every 12 (Twelve) Hours.             HYDROcodone-acetaminophen (NORCO) 7.5-325 MG per tablet  Take 1 tablet by mouth Every 6 (Six) Hours As Needed (for pain).             lisinopril (PRINIVIL,ZESTRIL) 10 MG tablet  Take 10 mg by mouth Daily.             Multiple Vitamins-Minerals (MULTIVITAMIN ADULT PO)  Take 1 tablet by mouth Daily.             Omega-3 Fatty Acids (FISH OIL) 1200 MG capsule delayed-release  Take 1 capsule by mouth Daily.             omeprazole (priLOSEC) 20 MG capsule  Take  20 mg by mouth Daily.             simvastatin (ZOCOR) 40 MG tablet  Take 40 mg by mouth Every Night.             tacrolimus (PROGRAF) 1 MG capsule  Apply 1 mg to the mouth or throat Take As Directed. Empty contents of 1 capsule into 1 L distilled water; Once dissolved, swish and spit up to 4 times a day.                 Discharge Diet:     Diet Instructions     Advance Diet As Tolerated                     Activity at Discharge:     Activity Instructions     Discharge Activity Restrictions       No driving until cleared by ortho.   WBAT with a walker.   Do not get incision site wet.   Apply ice to the wound with a protective barrier 20 min on and 2 hours off                 Follow-up Appointments:    Future Appointments  Date Time Provider Department Center   8/14/2017 10:00 AM Jose Wisdom PA-C [facility] None     Additional Instructions for the Follow-ups that You Need to Schedule     Ambulatory Referral to Home Health    As directed    Face to Face Visit Date:  8/3/2017   Follow-up Provider for Plan of Care?:  I will be treating the patient on an ongoing basis.  Please send me the Plan of Care for signature.   Follow-up Provider:  REENA WINN   Reason/Clinical Findings:  S/P right knee replacement   Describe mobility limitations that make leaving home difficult:  Pain and weakness   Nursing/Therapeutic Services Requested:   Skilled Nursing  Physical Therapy  Occupational Therapy      Skilled nursing orders:   Wound care dressing/changes  Neurovascular assessments  Medication education      PT orders:   Total joint pathway  Therapeutic exercise  Gait Training  Transfer training  Strengthening      Weight Bearing Status:  As Tolerated   Occupational orders:   Strengthening  Home safety assessment  Activities of daily living      Frequency:  1 Week 1                 Test Results Pending at Discharge:     Order Current Status    Tissue Pathology Exam In process          S/P Elective Right  TKA:    Discharge home today with home health.   Patient will be provided with DME as needed       Jose Wisdom PA-C  08/03/17  12:23 PM    Time: Discharge 25 min

## 2017-08-03 NOTE — PLAN OF CARE
Problem: Patient Care Overview (Adult)  Goal: Plan of Care Review  Outcome: Ongoing (interventions implemented as appropriate)    08/02/17 2000   Coping/Psychosocial Response Interventions   Plan Of Care Reviewed With patient   Outcome Evaluation   Outcome Summary/Follow up Plan pleasant patient with complaint of pain-medications given per physician's orders-IV fluids infusing-will continue to monitor   Patient Care Overview   Progress improving       Goal: Adult Individualization and Mutuality  Outcome: Ongoing (interventions implemented as appropriate)  Goal: Discharge Needs Assessment  Outcome: Ongoing (interventions implemented as appropriate)    Problem: Anesthesia/Analgesia, Neuraxial (Adult)  Goal: Signs and Symptoms of Listed Potential Problems Will be Absent or Manageable (Anesthesia/Analgesia, Neuraxial)  Outcome: Outcome(s) achieved Date Met:  08/02/17    Problem: Knee Replacement, Total (Adult)  Goal: Signs and Symptoms of Listed Potential Problems Will be Absent or Manageable (Knee Replacement, Total)  Outcome: Outcome(s) achieved Date Met:  08/02/17

## 2017-08-03 NOTE — PROGRESS NOTES
Continued Stay Note   Jacob     Patient Name: Sahara Hardwick  MRN: [id number]  Today's Date: 8/3/2017    Admit Date: 8/1/2017          Discharge Plan       08/03/17 1111    Case Management/Social Work Plan    Plan KE faxed order and face sheet to [facility] for follow up. Spoke to nurse regarding teaching patient how to use Lovenox. Called [facility] and gave them order for BSC and walker and face sheet.  Family  left before getting equipment. [facility] will contact them.     Patient/Family In Agreement With Plan yes    Final Note    Final Note Following for social and discharge needs.               Discharge Codes     None        Expected Discharge Date and Time     Expected Discharge Date Expected Discharge Time    Aug 3, 2017             Renetta Lew

## 2017-08-03 NOTE — PLAN OF CARE
Problem: Patient Care Overview (Adult)  Goal: Plan of Care Review  Outcome: Outcome(s) achieved Date Met:  08/03/17 08/03/17 1530   Coping/Psychosocial Response Interventions   Plan Of Care Reviewed With patient   Outcome Evaluation   Outcome Summary/Follow up Plan Patient able to achieve 3/4 outlined goals prior to D/C home with home health care.   Patient Care Overview   Progress improving         Problem: Inpatient Physical Therapy  Goal: Bed Mobility Goal LTG- PT  Outcome: Outcome(s) achieved Date Met:  08/03/17 08/01/17 1600 08/03/17 1530   Bed Mobility PT LTG   Bed Mobility PT LTG, Date Established 08/01/17 --    Bed Mobility PT LTG, Time to Achieve 2 wks --    Bed Mobility PT LTG, Activity Type all bed mobility --    Bed Mobility PT LTG, [place] Level conditional independence --    Bed Mobility PT Goal LTG, Assist Device bed rails --    Bed Mobility PT LTG, Outcome --  goal met   Bed Mobility PT LTG, Reason Goal Not Met --  discharged from facility       Goal: Transfer Training Goal 1 LTG- PT  Outcome: Outcome(s) achieved Date Met:  08/03/17 08/01/17 1600 08/03/17 1530   Transfer Training PT LTG   Transfer Training PT LTG, Date Established 08/01/17 --    Transfer Training PT LTG, Time to Achieve 2 wks --    Transfer Training PT LTG, Activity Type bed to chair /chair to bed;sit to stand/stand to sit --    Transfer Training PT LTG, [place] Level supervision required --    Transfer Training PT LTG, Assist Device walker, rolling --    Transfer Training PT LTG, Outcome --  goal partially met   Transfer Training PT LTG, Reason Goal Not Met --  discharged from facility       Goal: Gait Training Goal LTG- PT  Outcome: Outcome(s) achieved Date Met:  08/03/17 08/01/17 1600 08/03/17 1530   Gait Training PT LTG   Gait Training Goal PT LTG, Date Established 08/01/17 --    Gait Training Goal PT LTG, Time to Achieve 2 wks --    Gait Training Goal PT LTG, [place] Level supervision required --     Gait Training Goal PT LTG, Assist Device walker, rolling --    Gait Training Goal PT LTG, Distance to Achieve 400 --    Gait Training Goal PT LTG, Additional Goal with good posture and even step lengths --    Gait Training Goal PT LTG, Outcome --  goal partially met   Gait Training Goal PT LTG, Reason Goal Not Met --  discharged from facility       Goal: Strength Goal LTG- PT  Outcome: Unable to achieve outcome(s) by discharge Date Met:  08/03/17 08/03/17 1530   Strength Goal PT LTG   Strength Goal PT LTG, Outcome goal not met   Strength Goal PT LTG, Reason Goal Not Met discharged from facility

## 2017-08-03 NOTE — THERAPY DISCHARGE NOTE
Acute Care - Physical Therapy Discharge Summary   Jacob       Patient Name: Sahara Hardwick  : 1950  MRN: [id number]    Today's Date: 8/3/2017  Onset of Illness/Injury or Date of Surgery Date: 17    Date of Referral to PT: 17  Referring Physician: Dr. Dewitt      Admit Date: 2017      PT Recommendation and Plan    Visit Dx:    ICD-10-CM ICD-9-CM   1. Impaired functional mobility, balance, gait, and endurance Z74.09 V49.89   2. Primary osteoarthritis of right knee M17.11 715.16   3. Chronic pain of right knee M25.561 719.46    G89.29 338.29   4. Pre-op testing Z01.818 V72.84   5. Pre-op evaluation Z01.818 V72.84   6. Other disorders of iron metabolism  E83.19 275.09   7. Other disorders of iron metabolism E83.19 275.09   8. Impaired mobility and ADLs Z74.09 799.89   9. Status post total right knee replacement Z96.651 V43.65             Outcome Measures       17 1357 17 1032 17 1510    How much help from another person do you currently need...    Turning from your back to your side while in flat bed without using bedrails? 4  -RM      Moving from lying on back to sitting on the side of a flat bed without bedrails? 4  -RM      Moving to and from a bed to a chair (including a wheelchair)? 3  -RM      Standing up from a chair using your arms (e.g., wheelchair, bedside chair)? 3  -RM      Climbing 3-5 steps with a railing? 3  -RM      To walk in hospital room? 3  -RM      AM-PAC 6 Clicks Score 20  -RM      How much help from another is currently needed...    Putting on and taking off regular lower body clothing?  3  -AH 3  -SD    Bathing (including washing, rinsing, and drying)  3  -AH 3  -SD    Toileting (which includes using toilet bed pan or urinal)  3  -AH 3  -SD    Putting on and taking off regular upper body clothing  4  -AH 3  -SD    Taking care of personal grooming (such as brushing teeth)  4  -AH 4  -SD    Eating meals  4  -AH 4  -SD    Score  21  -AH 20  -SD     Functional Assessment    Outcome Measure Options AM-PAC 6 Clicks Basic Mobility (PT)  -RM  AM-[facility] 6 Clicks Daily Activity (OT)  -SD      08/01/17 1508          How much help from another person do you currently need...    Turning from your back to your side while in flat bed without using bedrails? 3  -JR      Moving from lying on back to sitting on the side of a flat bed without bedrails? 3  -JR      Moving to and from a bed to a chair (including a wheelchair)? 3  -JR      Standing up from a chair using your arms (e.g., wheelchair, bedside chair)? 3  -JR      Climbing 3-5 steps with a railing? 3  -JR      To walk in hospital room? 3  -JR      AM-PAC 6 Clicks Score 18  -JR      Functional Assessment    Outcome Measure Options AM-PAC 6 Clicks Basic Mobility (PT)  -JR        User Key  (r) = Recorded By, (t) = Taken By, (c) = Cosigned By    Initials Name Provider Type     Regla Yin Occupational Therapist    JR Betzaida Roy, PT Physical Therapist    RM Derrek Parekh, PTA Physical Therapy Assistant    ARMAND Ledesma, OT Therapist                      IP PT Goals       08/03/17 1530 08/02/17 1641 08/02/17 1600    Bed Mobility PT LTG    Bed Mobility PT LTG, Outcome goal met  -LM goal met  -RM goal ongoing  -RM    Bed Mobility PT LTG, Reason Goal Not Met discharged from [facility]  -      Transfer Training PT LTG    Transfer Training PT LTG, Outcome goal partially met  -LM goal partially met  -RM goal partially met  -RM    Transfer Training PT LTG, Reason Goal Not Met discharged from [facility]  -      Gait Training PT LTG    Gait Training Goal PT LTG, Outcome goal partially met  -LM goal partially met  -RM goal partially met  -RM    Gait Training Goal PT LTG, Reason Goal Not Met discharged from [facility]  -      Strength Goal PT LTG    Strength Goal PT LTG, Outcome goal not met  -LM goal ongoing  -RM goal ongoing  -RM    Strength Goal PT LTG, Reason Goal Not Met discharged from [facility]  -        08/01/17  1600          Bed Mobility PT LTG    Bed Mobility PT LTG, Date Established 08/01/17  -JR      Bed Mobility PT LTG, Time to Achieve 2 wks  -JR      Bed Mobility PT LTG, Activity Type all bed mobility  -JR      Bed Mobility PT LTG, [place] Level conditional independence  -JR      Bed Mobility PT Goal  LTG, Assist Device bed rails  -JR      Transfer Training PT LTG    Transfer Training PT LTG, Date Established 08/01/17  -JR      Transfer Training PT LTG, Time to Achieve 2 wks  -JR      Transfer Training PT LTG, Activity Type bed to chair /chair to bed;sit to stand/stand to sit  -JR      Transfer Training PT LTG, [place] Level supervision required  -JR      Transfer Training PT LTG, Assist Device walker, rolling  -JR      Gait Training PT LTG    Gait Training Goal PT LTG, Date Established 08/01/17  -JR      Gait Training Goal PT LTG, Time to Achieve 2 wks  -JR      Gait Training Goal PT LTG, [place] Level supervision required  -JR      Gait Training Goal PT LTG, Assist Device walker, rolling  -JR      Gait Training Goal PT LTG, Distance to Achieve 400  -JR      Gait Training Goal PT LTG, Additional Goal with good posture and even step lengths  -JR      Strength Goal PT LTG    Strength Goal PT LTG, Date Established 08/01/17  -JR      Strength Goal PT LTG, Time to Achieve 2 wks  -JR      Strength Goal PT LTG, Measure to Achieve perform exercises x 20 reps  -JR        User Key  (r) = Recorded By, (t) = Taken By, (c) = Cosigned By    Initials Name Provider Type    JR Betzaida Roy, PT Physical Therapist    JOVAN Knutson, PT Physical Therapist    RM Derrek Parekh, PTA Physical Therapy Assistant              PT Discharge Summary  Anticipated Discharge Disposition: home with home health  Reason for Discharge: Discharge from facility  Outcomes Achieved: Refer to plan of care for updates on goals achieved  Discharge Destination: Home with home health      Suzanne Knutson, PT   8/3/2017

## 2017-08-03 NOTE — PROGRESS NOTES
MARTY James    Nerve Cath Post Op Call    Patient Name: Sahara Hardwick  :  1950  MRN:  [id number]  Date of Discharge:     Nerve Cath Post Op Call:    Analgesia:Good  Pain Score:2/10  Side Effects:None  Catheter Site:clean  Patient Controlled ON Q pump infusion rate: 10ml/hr

## 2017-08-03 NOTE — PLAN OF CARE
Problem: Pain, Acute (Adult)  Goal: Identify Related Risk Factors and Signs and Symptoms  Outcome: Outcome(s) achieved Date Met:  08/02/17  Goal: Acceptable Pain Control/Comfort Level  Outcome: Ongoing (interventions implemented as appropriate)

## 2017-08-03 NOTE — PROGRESS NOTES
PROGRESS NOTE        Date of Admission: 8/1/2017  Length of Stay: 2  Primary Care Physician: Suzanne Islas MD    Subjective   Chief Complaint: Right knee replacement  HPI:  Patient seen today.  She is lying in bed.  She denies any chest pain, shortness breath, nausea, vomiting.  She did undergo a right total knee replacement performed by Dr. Dewitt.  She has had no events overnight.  Her pain is adequately controlled at this time.  PT OT has been consulted.  Case management is also been consulted for discharge planning.  Patient is otherwise hemodynamically stable.  The plan is for discharge home today with home health.   I have reviewed labs/imaging/records from this hospitalization, including ER staff and admitting/attending physicians H/P's and progress notes to establish a comprehensive understanding of this patient's clinical hospital course, as well as to establish a transition of care appropriately.        Review Of Systems:   Review of Systems   General ROS: Patient denies any fevers, chills or loss of consciousness. Complains of generalized weakness.   Psychological ROS: Denies any hallucinations and delusions.  Ophthalmic ROS: Denies any diplopia or transient loss of vision.  ENT ROS: Denies sore throat, nasal congestion or ear pain.   Allergy and Immunology ROS: Denies rash or itching.  Hematological and Lymphatic ROS: Denies neck swelling or easy bleeding.  Endocrine ROS: Denies any recent unintentional weight gain or loss.  Breast ROS: Denies any pain or swelling.  Respiratory ROS: Denies cough or shortness of breath.   Cardiovascular ROS: Denies chest pain or palpitations. No history of exertional chest pain.   Gastrointestinal ROS: Denies nausea and vomiting. Denies any abdominal pain. No diarrhea.  Genito-Urinary ROS: Denies dysuria or hematuria.  Musculoskeletal ROS: Denies back pain. No muscle pain. No calf pain.   Neurological ROS: Denies any focal weakness. No loss of consciousness. Denies any  numbness. Denies neck pain.   Dermatological ROS: Denies any redness or pruritis.    Objective      Temp:  [98.3 °F (36.8 °C)-99.6 °F (37.6 °C)] 99.5 °F (37.5 °C)  Heart Rate:  [63-81] 73  Resp:  [14-18] 16  BP: (106-158)/(54-74) 158/74  Physical Exam    General Appearance:  Alert and cooperative, not in any acute distress.   Head:  Atraumatic and normocephalic, without obvious abnormality.   Eyes:          PERRLA, conjunctivae and sclerae normal, no Icterus. No pallor. Extra-occular movements are within normal limits.   Ears:  Ears appear intact with no abnormalities noted.   Throat: No oral lesions, no thrush, oral mucosa moist.   Neck: Supple, trachea midline, no thyromegaly, no carotid bruit.   Back:   No kyphoscoliosis present. No tenderness to palpation,   range of motion normal.   Lungs:   Chest shape is normal. Breath sounds heard bilaterally equally.  No crackles or wheezing. No Pleural rub or bronchial breathing.   Heart:  Normal S1 and S2, no murmur, no gallop, no rub. No JVD   Abdomen:   Normal bowel sounds, no masses, no organomegaly. Soft     non-tender, non-distended, no guarding, no rebound                tenderness   Extremities: Moves all extremities well, no edema, no cyanosis, no clubbing.  Dressing right knee clean and dry   Pulses: Pulses palpable and equal bilaterally   Skin: No bleeding, bruising or rash   Lymph nodes: No palpable adenopathy   Neurologic:    Psychiatric/Behavior:     Cranial nerves 2 - 12 grossly intact, sensation intact, Motor power is normal and equal bilaterally.  Mood normal, behavior normal       Results Review:    I have reviewed the labs, radiology results and diagnostic studies.      Results from last 7 days  Lab Units 08/03/17  0507   WBC 10*3/mm3 11.78*   HEMOGLOBIN g/dL 11.9*   PLATELETS 10*3/mm3 292       Results from last 7 days  Lab Units 08/03/17  0507   SODIUM mmol/L 141   POTASSIUM mmol/L 3.4*   CO2 mmol/L 29.0   CREATININE mg/dL 0.60   GLUCOSE mg/dL 112*        Culture Data:    Radiology Data:   Cardiology Data:    I have reviewed the medications.    Assessment/Plan     Assessment and Plan:  1.  Chronic essential hypertension continue home antihypertensive medications-  BP is stable.  Continue to monitor.       2.  Right total knee replacement-patient on analgesics, DVT prophylaxis, PT OT has been consulted and case management for discharge planning    3.  Hyperkalemia-  Restart lisinopril.  Resolved.     Patient is stable for discharge home.              DVT prophylaxis:  Arixtra  Discharge Planning:   PRASAD Rosa 08/03/17 10:12 AM

## 2017-08-03 NOTE — PLAN OF CARE
Problem: Patient Care Overview (Adult)  Goal: Plan of Care Review  Outcome: Outcome(s) achieved Date Met:  08/03/17 08/03/17 1019   Coping/Psychosocial Response Interventions   Plan Of Care Reviewed With patient   Outcome Evaluation   Outcome Summary/Follow up Plan PATIENT DISCHARGED.    Patient Care Overview   Progress improving

## 2017-08-03 NOTE — PLAN OF CARE
Problem: Fall Risk (Adult)  Goal: Identify Related Risk Factors and Signs and Symptoms  Outcome: Outcome(s) achieved Date Met:  08/02/17  Goal: Absence of Falls  Outcome: Ongoing (interventions implemented as appropriate)

## 2017-08-04 NOTE — THERAPY DISCHARGE NOTE
Acute Care - Occupational Therapy Discharge Summary   James     Patient Name: Sahara Hardwick  : 1950  MRN: [id number]    Today's Date: 2017  Onset of Illness/Injury or Date of Surgery Date: 17    Date of Referral to OT: 17  Referring Physician: Dr. Dewitt      Admit Date: 2017        OT Recommendation and Plan    Visit Dx:    ICD-10-CM ICD-9-CM   1. Impaired functional mobility, balance, gait, and endurance Z74.09 V49.89   2. Primary osteoarthritis of right knee M17.11 715.16   3. Chronic pain of right knee M25.561 719.46    G89.29 338.29   4. Pre-op testing Z01.818 V72.84   5. Pre-op evaluation Z01.818 V72.84   6. Other disorders of iron metabolism  E83.19 275.09   7. Other disorders of iron metabolism E83.19 275.09   8. Impaired mobility and ADLs Z74.09 799.89   9. Status post total right knee replacement Z96.651 V43.65                     OT Goals       17 1522 17 1604       Strength OT LTG    Strength Goal OT LTG, Date Established  17  -SD     Strength Goal OT LTG, Time to Achieve  2 wks  -SD     Strength Goal OT LTG, Measure to Achieve  Patient will perform 15 reps using red therband in order to increase strength for carryover with ADL tasks  -SD     Strength Goal OT LTG, Date Goal Reviewed 17  -      Strength Goal OT LTG, Outcome goal met  - goal ongoing  -SD     Patient Education OT LTG    Patient Education OT LTG, Date Established  17  -SD     Patient Education OT LTG, Time to Achieve  2 wks  -SD     Patient Education OT LTG, Education Type  adaptive equipment mgmt  -SD     Patient Education OT LTG, Education Understanding  independent  -SD     Patient Education OT LTG, Date Goal Reviewed 17  -      Patient Education OT LTG Outcome goal ongoing  - goal ongoing  -SD     Bathing OT LTG    Bathing Goal OT LTG, Date Established  17  -SD     Bathing Goal OT LTG, Time to Achieve  2 wks  -SD     Bathing Goal OT LTG, Activity Type   lower body bathing  -SD     Bathing Goal OT LTG, Assist Device  sponge, long handled  -SD     Bathing Goal OT LTG, Date Goal Reviewed 08/02/17  -AH      Bathing Goal OT LTG, Outcome goal ongoing  -AH goal ongoing  -SD     LB Dressing OT LTG    LB Dressing Goal OT LTG, Date Established  08/01/17  -SD     LB Dressing Goal OT LTG, Time to Achieve  2 wks  -SD     LB Dressing Goal OT LTG, [place] Level  supervision required  -SD     LB Dressing Goal OT LTG, Adaptive Equipment  reacher;sock-aid;shoe horn, long handled  -SD     LB Dressing Goal OT LTG, Date Goal Reviewed 08/02/17  -      LB Dressing Goal OT LTG, Outcome goal ongoing  -AH goal ongoing  -SD       User Key  (r) = Recorded By, (t) = Taken By, (c) = Cosigned By    Initials Name Provider Type    ANDRES Yin Occupational Therapist    ARMAND Ledesma OT Therapist                Outcome Measures       08/02/17 1357 08/02/17 1032 08/01/17 1510    How much help from another person do you currently need...    Turning from your back to your side while in flat bed without using bedrails? 4  -RM      Moving from lying on back to sitting on the side of a flat bed without bedrails? 4  -RM      Moving to and from a bed to a chair (including a wheelchair)? 3  -RM      Standing up from a chair using your arms (e.g., wheelchair, bedside chair)? 3  -RM      Climbing 3-5 steps with a railing? 3  -RM      To walk in hospital room? 3  -RM      AM-PAC 6 Clicks Score 20  -RM      How much help from another is currently needed...    Putting on and taking off regular lower body clothing?  3  -AH 3  -SD    Bathing (including washing, rinsing, and drying)  3  -AH 3  -SD    Toileting (which includes using toilet bed pan or urinal)  3  -AH 3  -SD    Putting on and taking off regular upper body clothing  4  -AH 3  -SD    Taking care of personal grooming (such as brushing teeth)  4  -AH 4  -SD    Eating meals  4  -AH 4  -SD    Score  21  -AH 20  -SD    Functional Assessment     Outcome Measure Options AM-PAC 6 Clicks Basic Mobility (PT)  -[facility] 6 Clicks Daily Activity (OT)  -SD      08/01/17 9153          How much help from another person do you currently need...    Turning from your back to your side while in flat bed without using bedrails? 3  -JR      Moving from lying on back to sitting on the side of a flat bed without bedrails? 3  -JR      Moving to and from a bed to a chair (including a wheelchair)? 3  -JR      Standing up from a chair using your arms (e.g., wheelchair, bedside chair)? 3  -JR      Climbing 3-5 steps with a railing? 3  -JR      To walk in hospital room? 3  -JR      AM-PAC 6 Clicks Score 18  -JR      Functional Assessment    Outcome Measure Options AM-[facility] 6 Clicks Basic Mobility (PT)  -JR        User Key  (r) = Recorded By, (t) = Taken By, (c) = Cosigned By    Initials Name Provider Type     Regla Yin Occupational Therapist    JR Betzaida Roy, PT Physical Therapist    RM Derrek Parekh, RODRIGUE Physical Therapy Assistant    ARMAND Ledesma, OT Therapist              OT Discharge Summary  Anticipated Discharge Disposition: home with home health  Reason for Discharge: Discharge from facility  Outcomes Achieved: Patient able to partially acheive established goals  Discharge Destination: Home with home health      Yeni Ledesma OT  8/4/2017

## 2017-08-07 LAB
LAB AP CASE REPORT: NORMAL
Lab: NORMAL
PATH REPORT.FINAL DX SPEC: NORMAL

## 2017-08-14 ENCOUNTER — OFFICE VISIT (OUTPATIENT)
Dept: ORTHOPEDIC SURGERY | Facility: CLINIC | Age: 67
End: 2017-08-14

## 2017-08-14 VITALS — HEIGHT: 65 IN | BODY MASS INDEX: 29.99 KG/M2 | WEIGHT: 180 LBS | RESPIRATION RATE: 18 BRPM

## 2017-08-14 DIAGNOSIS — Z96.651 STATUS POST TOTAL RIGHT KNEE REPLACEMENT: Primary | ICD-10-CM

## 2017-08-14 PROCEDURE — 99024 POSTOP FOLLOW-UP VISIT: CPT | Performed by: PHYSICIAN ASSISTANT

## 2017-08-14 RX ORDER — HYDROCODONE BITARTRATE AND ACETAMINOPHEN 7.5; 325 MG/1; MG/1
1 TABLET ORAL EVERY 8 HOURS PRN
Qty: 30 TABLET | Refills: 0 | Status: SHIPPED | OUTPATIENT
Start: 2017-08-14 | End: 2017-11-07 | Stop reason: ALTCHOICE

## 2017-08-14 NOTE — PROGRESS NOTES
Subjective   Patient ID: Sahara Hardwick is a 66 y.o. right hand dominant female is here today for a post-operative visit  Pain and Post-op of the Right Knee and Suture / Staple Removal        History of Present Illness         Patient presents for routine scheduled follow-up visit.  This is her first postop visit in regards to right total knee replacement.  Patient states physical therapy is going well.  She denies chest pain or shortness of breath.  She states she has some swelling to the right knee but this is slowly subsiding.    Pain controlled: [] no   [x] yes   Medication refill requested: [x] no   [] yes    Patient compliant with instructions: [] no   [x] yes   Other:      Past Medical History:   Diagnosis Date   • Arthritis    • Diverticulitis    • GERD (gastroesophageal reflux disease)    • High cholesterol    • Hypertension         Past Surgical History:   Procedure Laterality Date   • GALLBLADDER SURGERY  1991   • HAND SURGERY  2015    right middle finger arthritis nodule removed.   • HYSTERECTOMY  1991   • VT TOTAL KNEE ARTHROPLASTY Right 8/1/2017    Procedure: Elective right total knee replacement (BIOMET);  Surgeon: John Dewitt MD;  Location: [facility];  Service: Orthopedics       Allergies   Allergen Reactions   • Milk-Related Compounds GI Intolerance   • Latex Rash     Tape and adhesive bandages cause rash        Review of Systems   Constitutional: Negative for fever.   HENT: Negative for voice change.    Eyes: Negative for visual disturbance.   Respiratory: Negative for shortness of breath.    Cardiovascular: Negative for chest pain.   Gastrointestinal: Negative for abdominal distention and abdominal pain.   Genitourinary: Negative for dysuria.   Musculoskeletal: Positive for arthralgias. Negative for gait problem and joint swelling.   Skin: Negative for rash.   Neurological: Negative for speech difficulty.   Hematological: Does not bruise/bleed easily.   Psychiatric/Behavioral: Negative for  "confusion.       Objective   Resp 18  Ht 65\" (165.1 cm)  Wt 180 lb (81.6 kg)  LMP  (LMP Unknown)  BMI 29.95 kg/m2      Signs of infection: [x] no                    [] yes   Drainage: [x] no                    [] yes   Incision: [x] healing well     []healed well   Motor exam intact: [] no                    [x] yes   Neurovascular exam intact: [] no                    [x] yes   Signs of compartment syndrome: [x] no                    [] yes   Signs of DVT: [x] no                    [] yes   Other:      Physical Exam   Constitutional: She is oriented to person, place, and time. She appears well-nourished.   Eyes: Conjunctivae are normal.   Pulmonary/Chest: Effort normal.   Musculoskeletal:        Right knee: She exhibits decreased range of motion and swelling. She exhibits no deformity and no erythema. Tenderness found. Medial joint line tenderness noted.   Neurological: She is alert and oriented to person, place, and time.   Psychiatric: She has a normal mood and affect.   Vitals reviewed.    Right Knee Exam     Range of Motion   Right knee extension: 13.   Right knee flexion: 75.     Other   Scars: present  Sensation: normal  Pulse: present           Extremity DVT signs are Negative on physical exam with negative Castillo sign, with no calf pain, with no palpable cords, with no increased pain with passive stretch/extension and with no skin tone change  Neurologic Exam     Mental Status   Oriented to person, place, and time.     Right Knee Exam     Tenderness   The patient is experiencing tenderness in the medial joint line.          Assessment/Plan   Independent Review of Radiographic Studies:    No new imaging done today.  Laboratory and Other Studies:  No new results reviewed today.     Procedures  [x] No procedures were performed in office today.     Sahara was seen today for suture / staple removal, pain and post-op.    Diagnoses and all orders for this visit:    Status post total right knee replacement  -   "   HYDROcodone-acetaminophen (NORCO) 7.5-325 MG per tablet; Take 1 tablet by mouth Every 8 (Eight) Hours As Needed (for pain).         Recommendations/Plan:     Sutures Staples or Pins [] Removed today  [] At prior visit  [] Plan removal later   Splint/cast applied: [x]no []SAC []LAC []SLC []LLC []PTB []Splint:    Brace: [x]not provided        []pt provided with:   Physical therapy: [x]not at this time    []home-based    []outpatient referral   Ultrasound: [x]not ordered         []order given to patient   Labs: [x]not ordered         []order given to patient   Weight Bearing status: []Full [x]WBAT []PWB []NWB []Other   Work/Activity status: []Regular []Medium [x]Light []Sedentary []No use extr   Prescriptions: []None given today  [x]As Noted   Imaging at next appt: []Yes  [x]No          Additional instructions: Patient agreeable to return sooner for any new concern.      Orthopedic activities reviewed and patient expressed appreciation  Discussion of orthopedic goals  Risk, benefits, and merits of treatment alternatives reviewed with the patient and questions answered  Ice, heat, and/or modalities as beneficial  After care and dental prophylaxis for joint replacement prosthesis      Exercise, medications, injections, other patient advice, and return appointment as noted.  Patient is encouraged to call or return for any issues or concerns.    FU in 4 weeks    Patient agreeable to call or return sooner for any concerns.

## 2017-09-12 ENCOUNTER — OFFICE VISIT (OUTPATIENT)
Dept: ORTHOPEDIC SURGERY | Facility: CLINIC | Age: 67
End: 2017-09-12

## 2017-09-12 VITALS — RESPIRATION RATE: 18 BRPM | BODY MASS INDEX: 29.99 KG/M2 | HEIGHT: 65 IN | WEIGHT: 180 LBS

## 2017-09-12 DIAGNOSIS — Z96.651 STATUS POST TOTAL RIGHT KNEE REPLACEMENT: Primary | ICD-10-CM

## 2017-09-12 PROCEDURE — 99024 POSTOP FOLLOW-UP VISIT: CPT | Performed by: PHYSICIAN ASSISTANT

## 2017-09-12 RX ORDER — LISINOPRIL 20 MG/1
TABLET ORAL
COMMUNITY
Start: 2017-08-16 | End: 2017-11-07 | Stop reason: ALTCHOICE

## 2017-09-12 NOTE — PROGRESS NOTES
"Subjective   Patient ID: Sahara Hardwick is a 66 y.o.  female   Post-op and Pain of the Right Knee        History of Present Illness     Patient denies chest pain or shortness of breath.  She states she completed home Physical Therapy and Is Ready to Enroll in Formal Therapy.  She States She Has Mild Swelling That Is Worse at Times but Better with Rest.  Pain controlled: [] no   [x] yes   Medication refill requested: [x] no   [] yes    Patient compliant with instructions: [] no   [x] yes   Other:      Past Medical History:   Diagnosis Date   • Arthritis    • Diverticulitis    • GERD (gastroesophageal reflux disease)    • High cholesterol    • Hypertension         Past Surgical History:   Procedure Laterality Date   • GALLBLADDER SURGERY  1991   • HAND SURGERY  2015    right middle finger arthritis nodule removed.   • HYSTERECTOMY  1991   • [place] TOTAL KNEE ARTHROPLASTY Right 8/1/2017    Procedure: Elective right total knee replacement (BIOMET);  Surgeon: John Dewitt MD;  Location: [facility];  Service: Orthopedics       Allergies   Allergen Reactions   • Milk-Related Compounds GI Intolerance   • Latex Rash     Tape and adhesive bandages cause rash        Review of Systems   Constitutional: Negative for fever.   HENT: Negative for voice change.    Eyes: Negative for visual disturbance.   Respiratory: Negative for shortness of breath.    Cardiovascular: Negative for chest pain.   Gastrointestinal: Negative for abdominal distention and abdominal pain.   Genitourinary: Negative for dysuria.   Musculoskeletal: Positive for arthralgias. Negative for gait problem and joint swelling.   Skin: Negative for rash.   Neurological: Negative for speech difficulty.   Hematological: Does not bruise/bleed easily.   Psychiatric/Behavioral: Negative for confusion.       Objective   Resp 18  Ht 65\" (165.1 cm)  Wt 180 lb (81.6 kg)  LMP  (LMP Unknown)  BMI 29.95 kg/m2      Signs of infection: [x] no                    [] yes "   Drainage: [x] no                    [] yes   Incision: [x] healing well     []healed well   Motor exam intact: [] no                    [x] yes   Neurovascular exam intact: [] no                    [x] yes   Signs of compartment syndrome: [x] no                    [] yes   Signs of DVT: [x] no                    [] yes   Other:      Physical Exam   Constitutional: She is oriented to person, place, and time.   Pulmonary/Chest: Effort normal.   Musculoskeletal:        Right knee: She exhibits decreased range of motion and swelling. She exhibits no ecchymosis, no erythema, no LCL laxity and no MCL laxity. Tenderness found. Medial joint line tenderness noted.        Right ankle: No tenderness.   Neurological: She is alert and oriented to person, place, and time.   Skin: No rash noted.   Psychiatric: She has a normal mood and affect. Her behavior is normal.   Vitals reviewed.    Right Knee Exam     Range of Motion   Right knee extension: 5.   Right knee flexion: 85.     Other   Erythema: absent  Scars: present (well healed)  Sensation: decreased (around the right side of the scar. )  Pulse: present           Extremity DVT signs are Negative on physical exam with negative Castillo sign, with no calf pain, with no palpable cords, with no increased pain with passive stretch/extension and with no skin tone change  Neurologic Exam     Mental Status   Oriented to person, place, and time.     Right Knee Exam     Tenderness   The patient is experiencing tenderness in the medial joint line.          Assessment/Plan   Independent Review of Radiographic Studies:    No new imaging done today.  Laboratory and Other Studies:  No new results reviewed today.     Procedures  [x] No procedures were performed in office today.     Sahara was seen today for post-op and pain.    Diagnoses and all orders for this visit:    Status post total right knee replacement  -     Ambulatory Referral to Physical Therapy Evaluate and treat        Recommendations/Plan:     Sutures Staples or Pins [] Removed today  [x] At prior visit  [] Plan removal later   Splint/cast applied: [x]no []SAC []LAC []SLC []LLC []PTB []Splint:    Brace: [x]not provided        []pt provided with:   Physical therapy: []not at this time    []home-based    [x]outpatient referral   Ultrasound: [x]not ordered         []order given to patient   Labs: []not ordered         []order given to patient   Weight Bearing status: []Full [x]WBAT []PWB []NWB []Other   Work/Activity status: []Regular []Medium [x]Light []Sedentary []No use extr   Prescriptions: [x]None given today  []As Noted   Imaging at next appt: []Yes  [x]No          Additional instructions: Patient agreeable to return sooner for any new concern.     Orthopedic activities reviewed and patient expressed appreciation  Discussion of orthopedic goals  Risk, benefits, and merits of treatment alternatives reviewed with the patient and questions answered  Physical therapy referral given  Ice, heat, and/or modalities as beneficial      Referral: Physical and/or Occupational Therapy referral  Patient is encouraged to call or return for any issues or concerns.  FU in 5 weeks  Enroll in formal PT    Patient agreeable to call or return sooner for any concerns.

## 2017-09-27 ENCOUNTER — TREATMENT (OUTPATIENT)
Dept: PHYSICAL THERAPY | Facility: CLINIC | Age: 67
End: 2017-09-27

## 2017-09-27 DIAGNOSIS — M17.11 PRIMARY OSTEOARTHRITIS OF RIGHT KNEE: Primary | ICD-10-CM

## 2017-09-27 DIAGNOSIS — Z96.651 HX OF TOTAL KNEE ARTHROPLASTY, RIGHT: ICD-10-CM

## 2017-09-27 PROCEDURE — G8979 MOBILITY GOAL STATUS: HCPCS | Performed by: PHYSICAL THERAPIST

## 2017-09-27 PROCEDURE — 97110 THERAPEUTIC EXERCISES: CPT | Performed by: PHYSICAL THERAPIST

## 2017-09-27 PROCEDURE — 97161 PT EVAL LOW COMPLEX 20 MIN: CPT | Performed by: PHYSICAL THERAPIST

## 2017-09-27 PROCEDURE — G8978 MOBILITY CURRENT STATUS: HCPCS | Performed by: PHYSICAL THERAPIST

## 2017-09-27 NOTE — PROGRESS NOTES
Physical Therapy Initial Evaluation and Plan of Care      Patient: Sahara Hardwick   : 1950  Diagnosis/ICD-10 Code:  No primary diagnosis found.  Referring practitioner: EUGENIO Mukherjee*    Subjective Evaluation    History of Present Illness  Mechanism of injury: PT reports 10 years of R knee pain.  Most recently in  she had her knee catch and get stuck.  She had pain that would not relent and she had to have a TKA on 17.        Patient Occupation: retired Quality of life: good    Pain  Current pain ratin  At worst pain rating: 3 (walking and standing long periods)  Location: anterior knee.   Aggravating factors: standing, ambulation, squatting and repetitive movement  Progression: improved    Treatments  Previous treatment: physical therapy ([facility] )  Discharged from (in last 30 days): home health care  Patient Goals  Patient goals for therapy: return to sport/leisure activities, decreased pain and increased motion             Objective     Active Range of Motion     Right Knee   Flexion: 83 degrees with pain  Extensor la degrees with pain    Additional Active Range of Motion Details  Knee flexion tightness and guarding.     Passive Range of Motion     Additional Passive Range of Motion Details  MM guarding and tightness noted on the R LE.     Strength/Myotome Testing     Right Knee   Flexion: 3+  Extension: 3+  Quadriceps contraction: fair    Swelling     Right Knee Girth Measurement (cm)   Joint line: 43 cm    General Comments     Knee Comments  5cm above mid patella 44.1cm  5cm below mid patella 39.4cm      gastroc tightness on the R LE.           Assessment & Plan     Assessment  Impairments: abnormal gait, abnormal muscle tone, abnormal or restricted ROM, activity intolerance, impaired balance, impaired physical strength, lacks appropriate home exercise program and pain with function  Assessment details: Patient is a 67 year old female who comes to physical therapy with R  knee OA. Signs and symptoms are consistent with s/p TKA on 8/1/17.  The patient currently has pain, decreased ROM, decreased strength, and inability to perform all essential functional activities. Pt will benefit from skilled PT services to address the above issues.     Prognosis: good  Prognosis details:   SHORT TERM GOALS:  2 weeks       1. Pt independent with HEP  2. Pt to demonstrate rudy hip strength 4/5 or greater to improve stability with ambulation  3. Pt to report being able to walk for 10 minutes without increasing pain in the right knee    LONG TERM GOALS:   6 weeks  1. Pt to demonstrate ability to perform full functional squat with good form and control of the knees and without increasing pain  2. Pt to demonstrate rudy hip strength to 4+/5 or greater to improve safety with ambulation on uneven surfaces  3. Pt to return to work full duty without increased pain in the right knee   4. Pt to demonstrate ability to perform step up/down 8 inch step x10 safely and without pain in the right knee   Functional Limitations: carrying objects, walking, uncomfortable because of pain, standing, stooping and unable to perform repetitive tasks  Plan  Therapy options: will be seen for skilled physical therapy services  Planned modality interventions: cryotherapy, electrical stimulation/Russian stimulation, thermotherapy (hydrocollator packs) and ultrasound  Planned therapy interventions: stretching, strengthening, soft tissue mobilization, manual therapy, motor coordination training, neuromuscular re-education, ADL retraining, balance/weight-bearing training, flexibility, functional ROM exercises, gait training and home exercise program  Treatment plan discussed with: patient  Plan details: Pt will be seen 2 times per week for skilled PT.         Manual Therapy:    4     mins  55862;  Therapeutic Exercise:    22     mins  82554;     Neuromuscular Valdez:        mins  14840;    Therapeutic Activity:          mins  75422;      Gait Training:           mins  10124;     Ultrasound:          mins  14206;    Electrical Stimulation:         mins  19295 ( );  Dry Needling          mins self-pay    Timed Treatment:   26   mins   Total Treatment:     50   mins    PT SIGNATURE: Bolivar Chakraborty, PT   DATE TREATMENT INITIATED: 9/27/2017    Medicare Initial Certification  Certification Period: 12/26/2017  I certify that the therapy services are furnished while this patient is under my care.  The services outlined above are required by this patient, and will be reviewed every 90 days.     PHYSICIAN: Jose Wisdom PA-C      DATE:     Please sign and return via fax to  .. Thank you, [facility].

## 2017-10-03 ENCOUNTER — TREATMENT (OUTPATIENT)
Dept: PHYSICAL THERAPY | Facility: CLINIC | Age: 67
End: 2017-10-03

## 2017-10-03 DIAGNOSIS — M17.11 PRIMARY OSTEOARTHRITIS OF RIGHT KNEE: ICD-10-CM

## 2017-10-03 DIAGNOSIS — Z96.651 HX OF TOTAL KNEE ARTHROPLASTY, RIGHT: Primary | ICD-10-CM

## 2017-10-03 PROCEDURE — 97140 MANUAL THERAPY 1/> REGIONS: CPT | Performed by: PHYSICAL THERAPIST

## 2017-10-03 PROCEDURE — 97110 THERAPEUTIC EXERCISES: CPT | Performed by: PHYSICAL THERAPIST

## 2017-10-05 ENCOUNTER — TREATMENT (OUTPATIENT)
Dept: PHYSICAL THERAPY | Facility: CLINIC | Age: 67
End: 2017-10-05

## 2017-10-05 DIAGNOSIS — Z96.651 HX OF TOTAL KNEE ARTHROPLASTY, RIGHT: Primary | ICD-10-CM

## 2017-10-05 PROCEDURE — 97110 THERAPEUTIC EXERCISES: CPT | Performed by: PHYSICAL THERAPIST

## 2017-10-05 PROCEDURE — 97140 MANUAL THERAPY 1/> REGIONS: CPT | Performed by: PHYSICAL THERAPIST

## 2017-10-05 NOTE — PROGRESS NOTES
Physical Therapy Daily Progress Note      Visit # : 3    Sahara Hardwick reports 2-3/10 pain today at rest.  Pt reports the R knee and the back are a little irritated today.  The ball bridges seemed to have made the .         Objective Pt present to PT today with no distress at rest.     Pt ambulating without AD into PT area.     AROM:  R knee flexion 92 degrees,  Ext 2 degrees        See Exercise, Manual, and Modality Logs for complete treatment.     Assessment/Plan  Pt s/p TKA on the R LE.  Pt with slow progress with the knee motion.       Progress per Plan of Care             Manual Therapy:    13     mins  02859;  Therapeutic Exercise:    32     mins  86653;     Neuromuscular Valdez:        mins  47290;    Therapeutic Activity:          mins  89629;     Gait Training:        ___  mins  99827;     Ultrasound:          mins  14271;    Electrical Stimulation:         mins  74350 ( );  Dry Needling          mins self-pay    Timed Treatment:   45   mins   Total Treatment:     51   mins    Bolivar Chakraborty, PT  Physical Therapist

## 2017-10-10 ENCOUNTER — TREATMENT (OUTPATIENT)
Dept: PHYSICAL THERAPY | Facility: CLINIC | Age: 67
End: 2017-10-10

## 2017-10-10 DIAGNOSIS — Z96.651 HX OF TOTAL KNEE ARTHROPLASTY, RIGHT: Primary | ICD-10-CM

## 2017-10-10 DIAGNOSIS — M17.11 PRIMARY OSTEOARTHRITIS OF RIGHT KNEE: ICD-10-CM

## 2017-10-10 PROCEDURE — 97530 THERAPEUTIC ACTIVITIES: CPT | Performed by: PHYSICAL THERAPIST

## 2017-10-10 PROCEDURE — 97110 THERAPEUTIC EXERCISES: CPT | Performed by: PHYSICAL THERAPIST

## 2017-10-10 NOTE — PROGRESS NOTES
Physical Therapy Daily Progress Note    Visit # : 4        Subjective Pt reports she has some discomfort medial knee. Had a sharp pain in this area when riding bike last visit. Feels that this area of her knee may be a little swollen as well. Pain today 3/10. Says knee feels stiff today.     Objective    See Exercise, Manual, and Modality Logs for complete treatment.   Mild edema medial knee. No sig TTP. No sig TTP at pes anserine.     AROM right knee flexion 100 (after stretching)       Assessment/Plan Able to perform 1/2 revolutions on bike today without increased pain. Did well with exercises today. Still some limitation with knee flexion. Instructed on seated knee flexion stretch for home.     Progress per Plan of Care           Manual Therapy:    10     mins  86209;  Therapeutic Exercise:    28     mins  92627;     Neuromuscular Valdez:         mins  01324;    Therapeutic Activity:     8     mins  09587;     Gait Training:            mins  52022;     Ultrasound:          mins  49717;    Electrical Stimulation:         mins  74658 ( );  Dry Needling          mins self-pay    Timed Treatment:   46   mins   Total Treatment:     60   mins    Chan Hunter, PT, DPT  Physical Therapist

## 2017-10-12 ENCOUNTER — TREATMENT (OUTPATIENT)
Dept: PHYSICAL THERAPY | Facility: CLINIC | Age: 67
End: 2017-10-12

## 2017-10-12 DIAGNOSIS — M17.11 PRIMARY OSTEOARTHRITIS OF RIGHT KNEE: ICD-10-CM

## 2017-10-12 DIAGNOSIS — Z96.651 HX OF TOTAL KNEE ARTHROPLASTY, RIGHT: Primary | ICD-10-CM

## 2017-10-12 PROCEDURE — 97110 THERAPEUTIC EXERCISES: CPT | Performed by: PHYSICAL THERAPIST

## 2017-10-12 PROCEDURE — 97140 MANUAL THERAPY 1/> REGIONS: CPT | Performed by: PHYSICAL THERAPIST

## 2017-10-12 NOTE — PROGRESS NOTES
Physical Therapy Daily Progress Note        Subjective Pt reports she is still having some discomfort medial knee. Thinks it looks a little swollen. Rates pain 3/10. Did okay with therapy last visit. Was okay doing 1/2 revolutions on the bike. Did not like full revolutions that were done the visit before.     Objective    See Exercise, Manual, and Modality Logs for complete treatment.   AROM right knee flexion 95 degrees      Assessment/Plan Pt did well with exercises today. There does appear to be some mild edema, however nothing significant. Was close to full revolution ROM on the bike today, however pt still fearful. Talked with pt about how to perform heel slides at home.     Progress per Plan of Care           Manual Therapy:    10     mins  58806;  Therapeutic Exercise:    32     mins  67294;     Neuromuscular Valdez:         mins  70315;    Therapeutic Activity:          mins  98141;     Gait Training:            mins  62563;     Ultrasound:          mins  53642;    Electrical Stimulation:         mins  69945 ( );  Dry Needling          mins self-pay    Timed Treatment:   42   mins   Total Treatment:     58   mins    Chan Hunter, PT, DPT  Physical Therapist

## 2017-10-16 ENCOUNTER — TREATMENT (OUTPATIENT)
Dept: PHYSICAL THERAPY | Facility: CLINIC | Age: 67
End: 2017-10-16

## 2017-10-16 DIAGNOSIS — Z96.651 HX OF TOTAL KNEE ARTHROPLASTY, RIGHT: Primary | ICD-10-CM

## 2017-10-16 PROCEDURE — 97530 THERAPEUTIC ACTIVITIES: CPT | Performed by: PHYSICAL THERAPIST

## 2017-10-16 PROCEDURE — 97110 THERAPEUTIC EXERCISES: CPT | Performed by: PHYSICAL THERAPIST

## 2017-10-16 NOTE — PROGRESS NOTES
Physical Therapy Daily Progress Note      Visit # : 6    Sahara Hardwick reports 0/10 pain today at rest.  Pt reports the stiffness in the knee is less.         Objective Pt present to PT today with no distress at rest.     R knee AROM: 93 degrees prior to stretching.  Ext 1 degree from neutral.       See Exercise, Manual, and Modality Logs for complete treatment.     Assessment/Plan  Pt with R knee function improving.  ROM limitations are still noted with flexion      Progress per Plan of Care             Manual Therapy:     7    mins  87320;  Therapeutic Exercise:    42     mins  62618;     Neuromuscular Valdez:        mins  68970;    Therapeutic Activity:     9     mins  72874;     Gait Training:        ___  mins  40735;     Ultrasound:          mins  48787;    Electrical Stimulation:         mins  69707 ( );  Dry Needling          mins self-pay    Timed Treatment:   58   mins   Total Treatment:     62   mins    Bolivar Chakraborty, PT  Physical Therapist

## 2017-10-18 ENCOUNTER — TREATMENT (OUTPATIENT)
Dept: PHYSICAL THERAPY | Facility: CLINIC | Age: 67
End: 2017-10-18

## 2017-10-18 DIAGNOSIS — Z96.651 HX OF TOTAL KNEE ARTHROPLASTY, RIGHT: Primary | ICD-10-CM

## 2017-10-18 PROCEDURE — 97110 THERAPEUTIC EXERCISES: CPT | Performed by: PHYSICAL THERAPIST

## 2017-10-18 NOTE — PROGRESS NOTES
Physical Therapy Daily Progress Note      Visit # : 7    Sahara Hardwick reports 0/10 pain today at rest.  Pt reports stiffness as the only issue at this time.  No pain with function.         Objective Pt present to PT today with no distress noted.     Pt with no antalgia noted with ambulation.       See Exercise, Manual, and Modality Logs for complete treatment.     Assessment/Plan  Pt with R knee TKA on 8/1/17.  Pt with less pain and stiffness with functional activity.       Progress per Plan of Care             Manual Therapy:         mins  57765;  Therapeutic Exercise:    39     mins  31719;     Neuromuscular Valdez:        mins  17019;    Therapeutic Activity:          mins  61083;     Gait Training:        ___  mins  46883;     Ultrasound:          mins  02430;    Electrical Stimulation:         mins  39715 ( );  Dry Needling          mins self-pay    Timed Treatment:   39   mins   Total Treatment:     55   mins    Bolivar Chakraborty, PT  Physical Therapist

## 2017-10-23 ENCOUNTER — TREATMENT (OUTPATIENT)
Dept: PHYSICAL THERAPY | Facility: CLINIC | Age: 67
End: 2017-10-23

## 2017-10-23 DIAGNOSIS — M17.11 PRIMARY OSTEOARTHRITIS OF RIGHT KNEE: ICD-10-CM

## 2017-10-23 DIAGNOSIS — Z96.651 HX OF TOTAL KNEE ARTHROPLASTY, RIGHT: Primary | ICD-10-CM

## 2017-10-23 PROCEDURE — 97110 THERAPEUTIC EXERCISES: CPT | Performed by: PHYSICAL THERAPIST

## 2017-10-23 NOTE — PROGRESS NOTES
Physical Therapy Daily Progress Note      Visit # : 8    Sahara Hardwick reports 2/10 pain today at rest.  Pt reports the pain and stiffness in the knee has been elevated over the past 2 days.         Objective Pt presents to PT today with no distress noted at rest.      AROM:  R knee flexion 99 degrees,  On  degrees flexion.       See Exercise, Manual, and Modality Logs for complete treatment.     Assessment/Plan  Pt with R knee TKA on 8/1/17.  Pt with her best ROM measurements taken today.       Progress per Plan of Care             Manual Therapy:         mins  48950;  Therapeutic Exercise:    42     mins  35424;     Neuromuscular Valdez:        mins  00262;    Therapeutic Activity:          mins  36637;     Gait Training:        ___  mins  24593;     Ultrasound:          mins  08597;    Electrical Stimulation:         mins  35907 ( );  Dry Needling          mins self-pay    Timed Treatment:   42   mins   Total Treatment:     52   mins    Bolivar Chakraborty, PT  Physical Therapist

## 2017-10-26 ENCOUNTER — TREATMENT (OUTPATIENT)
Dept: PHYSICAL THERAPY | Facility: CLINIC | Age: 67
End: 2017-10-26

## 2017-10-26 DIAGNOSIS — Z96.651 HX OF TOTAL KNEE ARTHROPLASTY, RIGHT: Primary | ICD-10-CM

## 2017-10-26 PROCEDURE — 97140 MANUAL THERAPY 1/> REGIONS: CPT | Performed by: PHYSICAL THERAPIST

## 2017-10-26 PROCEDURE — 97110 THERAPEUTIC EXERCISES: CPT | Performed by: PHYSICAL THERAPIST

## 2017-10-26 NOTE — PROGRESS NOTES
Physical Therapy Daily Progress Note      Visit # : 9    Sahara Hardwick reports 0/10 pain today at rest.  Pt reports she is continuing to do well with her ROM.          Objective Pt present to PT today with no distress noted.      AROM:  Knee flexion 100 degrees,        See Exercise, Manual, and Modality Logs for complete treatment.     Assessment/Plan  Pt with good progress noted with function and ROM.       Progress per Plan of Care             Manual Therapy:    5     mins  97968;  Therapeutic Exercise:    49     mins  17420;     Neuromuscular Valdez:        mins  29903;    Therapeutic Activity:          mins  60880;     Gait Training:        ___  mins  08625;     Ultrasound:          mins  08202;    Electrical Stimulation:         mins  72032 ( );  Dry Needling          mins self-pay    Timed Treatment:   54   mins   Total Treatment:     65   mins    Bolivar Chakraborty, PT  Physical Therapist

## 2017-11-01 ENCOUNTER — TREATMENT (OUTPATIENT)
Dept: PHYSICAL THERAPY | Facility: CLINIC | Age: 67
End: 2017-11-01

## 2017-11-01 DIAGNOSIS — Z96.651 HX OF TOTAL KNEE ARTHROPLASTY, RIGHT: Primary | ICD-10-CM

## 2017-11-01 PROCEDURE — 97140 MANUAL THERAPY 1/> REGIONS: CPT | Performed by: PHYSICAL THERAPIST

## 2017-11-01 PROCEDURE — 97110 THERAPEUTIC EXERCISES: CPT | Performed by: PHYSICAL THERAPIST

## 2017-11-01 NOTE — PROGRESS NOTES
Physical Therapy Daily Progress Note      Visit # : 10    Sahara Hardwick reports 0/10 pain today at rest.  Pt reports that her BP was elevated over the past few days.          Objective Pt present to PT today with no distress noted.     AROM:  R knee flexion 105 degrees.  106 degrees after exercises.       See Exercise, Manual, and Modality Logs for complete treatment.     Assessment/Plan  Pt with knee still moving good.  She has pain with higher level function.       Progress per Plan of Care             Manual Therapy:    12     mins  90057;  Therapeutic Exercise:    42     mins  92528;     Neuromuscular Valdez:        mins  19841;    Therapeutic Activity:          mins  35571;     Gait Training:        ___  mins  08016;     Ultrasound:          mins  43099;    Electrical Stimulation:         mins  50048 ( );  Dry Needling          mins self-pay    Timed Treatment:   54   mins   Total Treatment:     58   mins    Bolivar Chakraborty, PT  Physical Therapist

## 2017-11-06 ENCOUNTER — TREATMENT (OUTPATIENT)
Dept: PHYSICAL THERAPY | Facility: CLINIC | Age: 67
End: 2017-11-06

## 2017-11-06 DIAGNOSIS — Z96.651 HX OF TOTAL KNEE ARTHROPLASTY, RIGHT: Primary | ICD-10-CM

## 2017-11-06 PROCEDURE — 97110 THERAPEUTIC EXERCISES: CPT | Performed by: PHYSICAL THERAPIST

## 2017-11-06 NOTE — PROGRESS NOTES
Physical Therapy Daily Progress Note      Visit # : 11    Sahara Hardwick reports 0/10 pain today at rest.  Stiffness is the only issue she has been having.         Objective Pt present to PT today with no distress.     AROM:  R knee flexion 109 degrees.   Ext is +1 degree.   Knee flexion 117 degrees on the [employer]     Circumference 43.0 cm at mid patella on the R LE.     Pt ascending and descending stairs independently.      See Exercise, Manual, and Modality Logs for complete treatment.     Assessment/Plan  Pt has met all goals set at IE.  She is doing very well with function and strength.  Her ROM is improved greatly as well.        D/C to HEP.            Manual Therapy:         mins  27627;  Therapeutic Exercise:    53     mins  26975;     Neuromuscular Valdez:        mins  04195;    Therapeutic Activity:          mins  19423;     Gait Training:        ___  mins  23457;     Ultrasound:          mins  59145;    Electrical Stimulation:         mins  39727 ( );  Dry Needling          mins self-pay    Timed Treatment:   53   mins   Total Treatment:    70    mins    Bolivar Chakraborty, PT  Physical Therapist

## 2017-11-07 ENCOUNTER — OFFICE VISIT (OUTPATIENT)
Dept: ORTHOPEDIC SURGERY | Facility: CLINIC | Age: 67
End: 2017-11-07

## 2017-11-07 VITALS — HEIGHT: 65 IN | RESPIRATION RATE: 18 BRPM | BODY MASS INDEX: 29.99 KG/M2 | WEIGHT: 180 LBS

## 2017-11-07 DIAGNOSIS — Z96.651 STATUS POST TOTAL RIGHT KNEE REPLACEMENT: Primary | ICD-10-CM

## 2017-11-07 PROCEDURE — 99213 OFFICE O/P EST LOW 20 MIN: CPT | Performed by: PHYSICIAN ASSISTANT

## 2017-11-07 RX ORDER — AMOXICILLIN 500 MG/1
CAPSULE ORAL
Refills: 0 | COMMUNITY
Start: 2017-11-01 | End: 2018-01-22

## 2017-11-07 RX ORDER — METOPROLOL SUCCINATE 25 MG/1
TABLET, EXTENDED RELEASE ORAL
COMMUNITY
Start: 2017-10-30 | End: 2019-01-07

## 2017-11-07 RX ORDER — NAPROXEN SODIUM 220 MG
TABLET ORAL
COMMUNITY
Start: 2017-01-01 | End: 2019-01-14

## 2017-11-07 RX ORDER — ACETAMINOPHEN 500 MG
TABLET ORAL
COMMUNITY
Start: 2017-08-21 | End: 2018-08-01

## 2017-11-07 NOTE — PROGRESS NOTES
Subjective   Patient ID: Sahara Hardwick is a 67 y.o. right hand dominant female is here today for follow-up for right TKA Follow-up of the Right Knee         History of Present Illness    Patient is following up in regards to right total knee replacement.  Her date of surgery was 8/1/2017.  She states she is doing great.  She denies pain.  She states she graduated from formal physical therapy and has been instructed on proper home exercises.    Pain Score: no pain       Past Medical History:   Diagnosis Date   • Arthritis    • Diverticulitis    • GERD (gastroesophageal reflux disease)    • High cholesterol    • Hypertension         Past Surgical History:   Procedure Laterality Date   • GALLBLADDER SURGERY  1991   • HAND SURGERY  2015    right middle finger arthritis nodule removed.   • HYSTERECTOMY  1991   • MA TOTAL KNEE ARTHROPLASTY Right 8/1/2017    Procedure: Elective right total knee replacement (BIOMET);  Surgeon: John Dewitt MD;  Location: [facility];  Service: Orthopedics       Family History   Problem Relation Age of Onset   • Osteoarthritis Mother    • Heart block Mother    • Cancer Mother      breast   • Heart block Father    • Diabetes Father    • Hyperlipidemia Father    • Cancer Father      colon   • Coronary artery disease Other        Social History     Social History   • Marital status:      Spouse name: N/A   • Number of children: N/A   • Years of education: N/A     Occupational History   • retired      Social History Main Topics   • Smoking status: Never Smoker   • Smokeless tobacco: Never Used   • Alcohol use No   • Drug use: No   • Sexual activity: Defer     Other Topics Concern   • Not on file     Social History Narrative       Allergies   Allergen Reactions   • Milk-Related Compounds GI Intolerance   • Latex Rash     Tape and adhesive bandages cause rash        Review of Systems   Constitutional: Negative for fever.   HENT: Negative for voice change.    Eyes: Negative for visual  "disturbance.   Respiratory: Negative for shortness of breath.    Cardiovascular: Negative for chest pain.   Gastrointestinal: Negative for abdominal distention and abdominal pain.   Genitourinary: Negative for dysuria.   Musculoskeletal: Negative for arthralgias, gait problem and joint swelling.   Skin: Negative for rash.   Neurological: Negative for speech difficulty.   Hematological: Does not bruise/bleed easily.   Psychiatric/Behavioral: Negative for confusion.       Objective   Resp 18  Ht 65\" (165.1 cm)  Wt 180 lb (81.6 kg)  LMP  (LMP Unknown)  BMI 29.95 kg/m2   Physical Exam   Constitutional: She is oriented to person, place, and time. She appears well-nourished.   Eyes: Conjunctivae are normal.   Neck: No tracheal deviation present.   Pulmonary/Chest: Effort normal.   Musculoskeletal:        Right knee: She exhibits normal range of motion, no ecchymosis, no erythema and normal alignment. No medial joint line and no lateral joint line tenderness noted.   Neurological: She is alert and oriented to person, place, and time.   Skin: No rash noted.   Psychiatric: She has a normal mood and affect. Her behavior is normal.   Vitals reviewed.    Right Knee Exam     Range of Motion   Extension: normal   Flexion: normal     Other   Erythema: absent  Scars: present           Extremity DVT signs are Negative on physical exam with negative Castillo sign, with no calf pain, with no palpable cords, with no increased pain with passive stretch/extension and with no skin tone change   Neurologic Exam     Mental Status   Oriented to person, place, and time.      Right Knee Exam     Tenderness   The patient is experiencing tenderness in the no medial joint line, no lateral joint line.               Assessment/Plan   Independent Review of Radiographic Studies:    No new imaging done today.  Laboratory and Other Studies:  No new results reviewed today.       Procedures  [x] No procedures were performed in office today.     Sahara" was seen today for follow-up.    Diagnoses and all orders for this visit:    Status post total right knee replacement      Regular exercise as tolerated  Orthopedic activities reviewed and patient expressed appreciation  Discussion of orthopedic goals  Risk, benefits, and merits of treatment alternatives reviewed with the patient and questions answered    Recommendations/Plan:  Patient is encouraged to call or return for any issues or concerns.  FU in 3 months or PRN    Patient agreeable to call or return sooner for any concerns.

## 2018-01-22 ENCOUNTER — OFFICE VISIT (OUTPATIENT)
Dept: ORTHOPEDIC SURGERY | Facility: CLINIC | Age: 68
End: 2018-01-22

## 2018-01-22 VITALS — RESPIRATION RATE: 18 BRPM | WEIGHT: 159 LBS | HEIGHT: 65 IN | BODY MASS INDEX: 26.49 KG/M2

## 2018-01-22 DIAGNOSIS — Z96.651 STATUS POST TOTAL RIGHT KNEE REPLACEMENT USING CEMENT: Primary | ICD-10-CM

## 2018-01-22 PROCEDURE — 99213 OFFICE O/P EST LOW 20 MIN: CPT | Performed by: ORTHOPAEDIC SURGERY

## 2018-01-22 RX ORDER — AMLODIPINE, VALSARTAN AND HYDROCHLOROTHIAZIDE 5; 160; 12.5 MG/1; MG/1; MG/1
TABLET ORAL
COMMUNITY
Start: 2017-12-13 | End: 2018-08-01

## 2018-01-22 NOTE — PROGRESS NOTES
Subjective   Patient ID: Sahara Hardwick is a 67 y.o. right hand dominant female is here today for orthopaedic evaluation of recovery progress with treatment. and is here today for a treatment planning discussion.  Follow-up of the Right Knee       History of Present Illness    Progress Note:  Patient reports that with treatments and since the last visit, overall pain is resolved, strength is full, and range of motion is full.  Patient is currently using prn medication (Naproxen).   Physical therapy has been effective.  Injection therapy has not been given..   Patient does not  present with recent labs or studies for review.      Past Medical History:   Diagnosis Date   • Arthritis    • Diverticulitis    • GERD (gastroesophageal reflux disease)    • High cholesterol    • Hypertension         Past Surgical History:   Procedure Laterality Date   • GALLBLADDER SURGERY  1991   • HAND SURGERY  2015    right middle finger arthritis nodule removed.   • HYSTERECTOMY  1991   • [place] TOTAL KNEE ARTHROPLASTY Right 8/1/2017    Procedure: Elective right total knee replacement (BIOMET);  Surgeon: John Dewitt MD;  Location: [facility];  Service: Orthopedics        Family History   Problem Relation Age of Onset   • Osteoarthritis Mother    • Heart block Mother    • Cancer Mother      breast   • Heart block Father    • Diabetes Father    • Hyperlipidemia Father    • Cancer Father      colon   • Coronary artery disease Other         Social History     Social History   • Marital status:      Spouse name: N/A   • Number of children: N/A   • Years of education: N/A     Occupational History   • retired      Social History Main Topics   • Smoking status: Never Smoker   • Smokeless tobacco: Never Used   • Alcohol use No   • Drug use: No   • Sexual activity: Defer     Other Topics Concern   • Not on file     Social History Narrative       Allergies   Allergen Reactions   • Milk-Related Compounds GI Intolerance   • Latex Rash     Tape  "and adhesive bandages cause rash        Review of Systems   Constitutional: Negative for fever.   HENT: Negative for voice change.    Eyes: Negative for visual disturbance.   Respiratory: Negative for shortness of breath.    Cardiovascular: Negative for chest pain.   Gastrointestinal: Negative for abdominal distention and abdominal pain.   Genitourinary: Negative for dysuria.   Musculoskeletal: Negative for arthralgias, gait problem and joint swelling.   Skin: Negative for rash.   Neurological: Negative for speech difficulty.   Hematological: Does not bruise/bleed easily.   Psychiatric/Behavioral: Negative for confusion.         Objective   Resp 18  Ht 165.1 cm (65\")  Wt 72.1 kg (159 lb)  LMP  (LMP Unknown)  BMI 26.46 kg/m2    Physical Exam   Constitutional: She appears well-developed. No distress.   Musculoskeletal: She exhibits no deformity.        Right knee: She exhibits no effusion.   Neurological: She is alert. Gait normal.   Skin: Skin is warm and dry. No rash noted. No erythema.   Psychiatric: She has a normal mood and affect. Her speech is normal.   Vitals reviewed.    Right Knee Exam     Tenderness   The patient is experiencing no tenderness.         Range of Motion   Extension: 0   Right knee flexion: 117 degrees.     Muscle Strength     The patient has normal right knee strength.    Tests   Varus: negative  Valgus: negative  Patellar Apprehension: negative    Other   Erythema: absent  Scars: present (healed pristine)  Pulse: present  Swelling: none  Other tests: no effusion present      Back Exam     Muscle Strength   Right Quadriceps:  5/5   Left Quadriceps:  5/5   Right Hamstrings:  5/5   Left Hamstrings:  5/5         Extremity DVT signs are Negative on physical exam with negative Castillo sign, with no calf pain, with no palpable cords, with no increased pain with passive stretch/extension and with no skin tone change   Neurologic Exam     Mental Status   Attention: normal.   Speech: speech is normal "   Level of consciousness: alert  Knowledge: good.     Motor Exam   Overall muscle tone: normal    Strength   Right quadriceps: 5/5  Left quadriceps: 5/5  Right hamstrin/5  Left hamstrin/5    Gait, Coordination, and Reflexes     Gait  Gait: normal    Right Knee Exam     Muscle Strength   Normal right knee strength        Assessment/Plan   Independent Review of Radiographic Studies:    No new imaging done today.  Laboratory and Other Studies:  No new results reviewed today.   Medical Decision Making:    Excellent progress.  May resume routine activity, work, sports and/or exercise as tolerated.    Procedures  [x]  No procedures were performed in office today.    Sahara was seen today for follow-up.    Diagnoses and all orders for this visit:    Status post total right knee replacement using cement       Regular exercise as tolerated  Orthopedic activities reviewed and patient expressed appreciation  Discussion of orthopedic goals  After care and dental prophylaxis for joint replacement prosthesis  Guided on proper techniques for mobility, strength, agility and/or conditioning exercises    Recommendations/Plan:  Exercise, medications, injections, other patient advice, and return appointment as noted.  Brace: No brace was given at today's visit  Referral: No referrals made at today's visit  Test/Studies: No additional studies ordered.  Surgery: No surgery proposed at this visit.  Work/Activity Status: May perform usual activities as tolerated  Patient is encouraged to call or return for any issues or concerns.     Return in 7 months (on 2018) for x-ray right knee on arrival, Recheck.  Patient agreeable to call or return sooner for any concerns.      Scribed for John Dewitt MD by Shamika Scherer R.N.. 2018  10:44 AM

## 2018-07-30 DIAGNOSIS — Z96.651 STATUS POST TOTAL RIGHT KNEE REPLACEMENT USING CEMENT: Primary | ICD-10-CM

## 2018-08-01 ENCOUNTER — OFFICE VISIT (OUTPATIENT)
Dept: ORTHOPEDIC SURGERY | Facility: CLINIC | Age: 68
End: 2018-08-01

## 2018-08-01 VITALS — WEIGHT: 161 LBS | BODY MASS INDEX: 26.82 KG/M2 | RESPIRATION RATE: 18 BRPM | HEIGHT: 65 IN

## 2018-08-01 DIAGNOSIS — Z96.651 STATUS POST TOTAL RIGHT KNEE REPLACEMENT USING CEMENT: Primary | ICD-10-CM

## 2018-08-01 PROCEDURE — 73560 X-RAY EXAM OF KNEE 1 OR 2: CPT | Performed by: ORTHOPAEDIC SURGERY

## 2018-08-01 PROCEDURE — 99213 OFFICE O/P EST LOW 20 MIN: CPT | Performed by: ORTHOPAEDIC SURGERY

## 2018-08-01 RX ORDER — SIMVASTATIN 40 MG
TABLET ORAL
COMMUNITY
End: 2019-01-07 | Stop reason: SDUPTHER

## 2018-08-01 RX ORDER — CHLORAL HYDRATE 500 MG
CAPSULE ORAL
COMMUNITY
End: 2019-01-07

## 2018-08-01 RX ORDER — OMEPRAZOLE 20 MG/1
CAPSULE, DELAYED RELEASE ORAL
COMMUNITY
Start: 2013-05-21 | End: 2019-08-13

## 2018-08-01 RX ORDER — CALCIUM CARBONATE 200(500)MG
TABLET,CHEWABLE ORAL
COMMUNITY
End: 2019-08-13

## 2018-08-01 RX ORDER — ASPIRIN 81 MG/1
TABLET, CHEWABLE ORAL
COMMUNITY
End: 2019-01-07

## 2018-08-01 RX ORDER — VALSARTAN AND HYDROCHLOROTHIAZIDE 160; 12.5 MG/1; MG/1
TABLET, FILM COATED ORAL
COMMUNITY
End: 2019-01-07

## 2018-08-01 RX ORDER — METOPROLOL SUCCINATE 25 MG/1
TABLET, EXTENDED RELEASE ORAL
COMMUNITY
End: 2019-01-07 | Stop reason: SDUPTHER

## 2018-08-01 NOTE — PROGRESS NOTES
Subjective   Patient ID: Sahara Hardwick is a 67 y.o. right hand dominant female is here today for orthopaedic evaluation of recovery progress with treatment.  Annual Exam of the Right Knee       History of Present Illness     Progress Note:  Patient reports that with treatments and since the last visit, overall pain is resolved, strength is full, and range of motion is full. Physical therapy has most recently been performed at home.  Injection therapy has not been given.. Patient does not  present with recent labs, imaging or other studies for review.    Patient is very satisfied with her pain relief and good function of her right knee replacement.    Past Medical History:   Diagnosis Date   • Arthritis    • Diverticulitis    • GERD (gastroesophageal reflux disease)    • High cholesterol    • Hypertension         Past Surgical History:   Procedure Laterality Date   • GALLBLADDER SURGERY  1991   • HAND SURGERY  2015    right middle finger arthritis nodule removed.   • HYSTERECTOMY  1991   • CT TOTAL KNEE ARTHROPLASTY Right 8/1/2017    Procedure: Elective right total knee replacement (BIOMET);  Surgeon: John Dewitt MD;  Location: [facility];  Service: Orthopedics        Family History   Problem Relation Age of Onset   • Osteoarthritis Mother    • Heart block Mother    • Cancer Mother         breast   • Heart block Father    • Diabetes Father    • Hyperlipidemia Father    • Cancer Father         colon   • Coronary artery disease Other         Social History     Social History   • Marital status:      Spouse name: N/A   • Number of children: N/A   • Years of education: N/A     Occupational History   • retired      Social History Main Topics   • Smoking status: Never Smoker   • Smokeless tobacco: Never Used   • Alcohol use No   • Drug use: No   • Sexual activity: Defer     Other Topics Concern   • Not on file     Social History Narrative   • No narrative on file       Allergies   Allergen Reactions   •  "Milk-Related Compounds GI Intolerance   • Latex Rash     Tape and adhesive bandages cause rash        Review of Systems   Constitutional: Negative for fever.   Musculoskeletal: Negative for arthralgias.         Objective   Resp 18   Ht 165.1 cm (65\")   Wt 73 kg (161 lb)   LMP  (LMP Unknown)   BMI 26.79 kg/m²     Physical Exam   Constitutional: She appears well-developed. No distress.   Musculoskeletal: She exhibits no deformity.        Right knee: She exhibits no effusion.   Neurological: She is alert. Gait normal.   Skin: Skin is warm and dry. No rash noted. No erythema.   Psychiatric: She has a normal mood and affect. Her speech is normal.   Vitals reviewed.    Right Knee Exam     Tenderness   The patient is experiencing no tenderness.         Range of Motion   Extension: 0   Right knee flexion: 125 degrees.     Muscle Strength     The patient has normal right knee strength.    Tests   Varus: negative  Valgus: negative  Patellar Apprehension: negative    Other   Erythema: absent  Sensation: normal  Pulse: present  Swelling: none  Other tests: no effusion present      Back Exam     Muscle Strength   Right Quadriceps:  5/5   Left Quadriceps:  5/5   Right Hamstrings:  5/5   Left Hamstrings:  5/5         Extremity DVT signs are Negative on physical exam with negative Castillo sign, with no calf pain, with no palpable cords and with no skin tone change   Neurologic Exam     Mental Status   Attention: normal.   Speech: speech is normal   Level of consciousness: alert  Knowledge: good.     Motor Exam   Overall muscle tone: normal    Strength   Right quadriceps: 5/5  Left quadriceps: 5/5  Right hamstrin/5  Left hamstrin/5    Gait, Coordination, and Reflexes     Gait  Gait: normal    Right Knee Exam     Muscle Strength   Normal right knee strength        Assessment/Plan   Independent Review of Radiographic Studies:    AP both knees and lateral of right knee, indication to evaluate status of prosthesis and joint, " and compared with prior imaging, shows no acute fracture or dislocation. Good position and alignment of prosthesis with no radiographic signs of loosening.  There is moderate anteromedial osteoarthritis of left knee.   Laboratory and Other Studies:  No new results reviewed today.   Medical Decision Making:    No complications of procedure and treatments.  Stable neurovascular exam.  Excellent progress.  May resume routine activity, work, sports and/or exercise as tolerated.    Procedures    Sahara was seen today for annual exam.    Diagnoses and all orders for this visit:    Status post total right knee replacement using cement       Discussion of orthopaedic goals and activities and patient and/or guardian expressed appreciation.  Regular exercise as tolerated  Guided on proper techniques for mobility, strength, agility and/or conditioning exercises  After care and dental prophylaxis for joint replacement prosthesis    Recommendations/Plan:  Exercise, medications, injections, other patient advice, and return appointment as noted.  Brace: No brace was given at today's visit  Referral: No referrals made at today's visit  Test/Studies: No additional studies ordered.  Surgery: No surgery proposed at this visit.  Work/Activity Status: May perform usual activities as tolerated and May return to routine exercise and physical work as tolerated.  Patient is encouraged to call or return for any issues or concerns.     Return in about 1 year (around 8/1/2019) for XOA Right Knee, repeat exam, update plan, annual exam.  Patient agreeable to call or return sooner for any concerns.           Portions of this note have been scribed for John Dewitt MD by Donovan JAMES(R), ROT, OBT. 8/1/2018  10:55 AM

## 2019-01-07 ENCOUNTER — OFFICE VISIT (OUTPATIENT)
Dept: INTERNAL MEDICINE | Facility: CLINIC | Age: 69
End: 2019-01-07

## 2019-01-07 VITALS
BODY MASS INDEX: 26.91 KG/M2 | HEIGHT: 65 IN | OXYGEN SATURATION: 99 % | HEART RATE: 73 BPM | SYSTOLIC BLOOD PRESSURE: 150 MMHG | TEMPERATURE: 97.9 F | DIASTOLIC BLOOD PRESSURE: 78 MMHG | WEIGHT: 161.5 LBS | RESPIRATION RATE: 16 BRPM

## 2019-01-07 DIAGNOSIS — E78.2 MIXED HYPERLIPIDEMIA: ICD-10-CM

## 2019-01-07 DIAGNOSIS — I10 BENIGN ESSENTIAL HYPERTENSION: Primary | ICD-10-CM

## 2019-01-07 LAB
ALBUMIN SERPL-MCNC: 4.7 G/DL (ref 3.5–5)
ALBUMIN/GLOB SERPL: 2.1 G/DL (ref 1–2)
ALP SERPL-CCNC: 68 U/L (ref 38–126)
ALT SERPL-CCNC: 36 U/L (ref 13–69)
AST SERPL-CCNC: 31 U/L (ref 15–46)
BASOPHILS # BLD AUTO: 0.04 10*3/MM3 (ref 0–0.2)
BASOPHILS NFR BLD AUTO: 0.4 % (ref 0–2.5)
BILIRUB SERPL-MCNC: 0.7 MG/DL (ref 0.2–1.3)
BUN SERPL-MCNC: 20 MG/DL (ref 7–20)
BUN/CREAT SERPL: 28.6 (ref 7.1–23.5)
CALCIUM SERPL-MCNC: 9.8 MG/DL (ref 8.4–10.2)
CHLORIDE SERPL-SCNC: 101 MMOL/L (ref 98–107)
CHOLEST SERPL-MCNC: 196 MG/DL (ref 0–199)
CO2 SERPL-SCNC: 29 MMOL/L (ref 26–30)
CREAT SERPL-MCNC: 0.7 MG/DL (ref 0.6–1.3)
EOSINOPHIL # BLD AUTO: 0.1 10*3/MM3 (ref 0–0.7)
EOSINOPHIL NFR BLD AUTO: 1.1 % (ref 0–7)
ERYTHROCYTE [DISTWIDTH] IN BLOOD BY AUTOMATED COUNT: 14.1 % (ref 11.5–14.5)
GLOBULIN SER CALC-MCNC: 2.2 GM/DL
GLUCOSE SERPL-MCNC: 89 MG/DL (ref 74–98)
HCT VFR BLD AUTO: 44.4 % (ref 37–47)
HDLC SERPL-MCNC: 47 MG/DL (ref 40–60)
HGB BLD-MCNC: 14 G/DL (ref 12–16)
IMM GRANULOCYTES # BLD AUTO: 0.02 10*3/MM3 (ref 0–0.06)
IMM GRANULOCYTES NFR BLD AUTO: 0.2 % (ref 0–0.6)
LDLC SERPL CALC-MCNC: 117 MG/DL (ref 0–99)
LYMPHOCYTES # BLD AUTO: 3.17 10*3/MM3 (ref 0.6–3.4)
LYMPHOCYTES NFR BLD AUTO: 35.2 % (ref 10–50)
MCH RBC QN AUTO: 28.4 PG (ref 27–31)
MCHC RBC AUTO-ENTMCNC: 31.5 G/DL (ref 30–37)
MCV RBC AUTO: 90.1 FL (ref 81–99)
MONOCYTES # BLD AUTO: 0.71 10*3/MM3 (ref 0–0.9)
MONOCYTES NFR BLD AUTO: 7.9 % (ref 0–12)
NEUTROPHILS # BLD AUTO: 4.96 10*3/MM3 (ref 2–6.9)
NEUTROPHILS NFR BLD AUTO: 55.2 % (ref 37–80)
NRBC BLD AUTO-RTO: 0 /100 WBC (ref 0–0)
PLATELET # BLD AUTO: 372 10*3/MM3 (ref 130–400)
POTASSIUM SERPL-SCNC: 3.8 MMOL/L (ref 3.5–5.1)
PROT SERPL-MCNC: 6.9 G/DL (ref 6.3–8.2)
RBC # BLD AUTO: 4.93 10*6/MM3 (ref 4.2–5.4)
SODIUM SERPL-SCNC: 140 MMOL/L (ref 137–145)
TRIGL SERPL-MCNC: 159 MG/DL
VLDLC SERPL CALC-MCNC: 31.8 MG/DL
WBC # BLD AUTO: 9 10*3/MM3 (ref 4.8–10.8)

## 2019-01-07 PROCEDURE — 99203 OFFICE O/P NEW LOW 30 MIN: CPT | Performed by: INTERNAL MEDICINE

## 2019-01-07 RX ORDER — METOPROLOL SUCCINATE 25 MG/1
25 TABLET, EXTENDED RELEASE ORAL DAILY
Qty: 90 TABLET | Refills: 3 | Status: SHIPPED | OUTPATIENT
Start: 2019-01-07 | End: 2019-05-07

## 2019-01-07 RX ORDER — LISINOPRIL AND HYDROCHLOROTHIAZIDE 20; 12.5 MG/1; MG/1
1 TABLET ORAL DAILY
Qty: 30 TABLET | Refills: 5 | Status: SHIPPED | OUTPATIENT
Start: 2019-01-07 | End: 2019-02-05 | Stop reason: SDUPTHER

## 2019-01-07 RX ORDER — SIMVASTATIN 20 MG
20 TABLET ORAL NIGHTLY
Qty: 90 TABLET | Refills: 3 | Status: SHIPPED | OUTPATIENT
Start: 2019-01-07 | End: 2019-11-20 | Stop reason: SDUPTHER

## 2019-01-07 NOTE — PROGRESS NOTES
Subjective   Sahara Hardwick is a 68 y.o. female.     Chief Complaint   Patient presents with   • Establish Care   • Hyperlipidemia   • Hypertension       History of Present Illness   HPI: Patient is here to follow up on the blood pressure  The patient is taking the blood pressure medications as prescribed and has had no side effects. The patient is also here to follow up on the cholesterol and is trying to follow a diet. The patient is   due to get lab work done .  The patient also needs refills on medications .  She would like to change valsartan due to fda recall , she get her mammogram at [facility], she needs a colonoscopy 2019 - dr burton  And dexa scan in 2 yrs   Hyperlipidemia   Pertinent negatives include no chest pain or shortness of breath.   Hypertension   Pertinent negatives include no chest pain, palpitations or shortness of breath.      Review of Systems   Constitutional: Negative for appetite change, fatigue and fever.   HENT: Negative for congestion, ear discharge, ear pain, sinus pressure and sore throat.    Eyes: Negative for pain and discharge.   Respiratory: Negative for cough, chest tightness, shortness of breath and wheezing.    Cardiovascular: Negative for chest pain, palpitations and leg swelling.   Gastrointestinal: Negative for abdominal pain, blood in stool, constipation, diarrhea and nausea.   Endocrine: Negative for cold intolerance and heat intolerance.   Genitourinary: Negative for dysuria, flank pain and frequency.   Musculoskeletal: Negative for back pain and joint swelling.   Skin: Negative for color change.   Allergic/Immunologic: Negative for environmental allergies and food allergies.   Neurological: Negative for dizziness, weakness, numbness and headaches.   Hematological: Negative for adenopathy. Does not bruise/bleed easily.   Psychiatric/Behavioral: Negative for behavioral problems and dysphoric mood. The patient is not nervous/anxious.        Past Medical History:   Diagnosis  Date   • Allergic     seasonal   • Arthritis    • Diverticulitis    • Diverticulosis    • GERD (gastroesophageal reflux disease)    • High cholesterol    • Hypertension    • Kidney stone    • Osteopenia 2018    osteopenia       Past Surgical History:   Procedure Laterality Date   • APPENDECTOMY     • CHOLECYSTECTOMY     • COLONOSCOPY     • GALLBLADDER SURGERY     • HAND SURGERY      right middle finger arthritis nodule removed.   • HYSTERECTOMY     • JOINT REPLACEMENT  2017   • LA TOTAL KNEE ARTHROPLASTY Right 2017    Procedure: Elective right total knee replacement (BIOMET);  Surgeon: John Dewitt MD;  Location: [facility];  Service: Orthopedics       Family History   Problem Relation Age of Onset   • Osteoarthritis Mother    • Heart block Mother    • Cancer Mother         breast/    • Arthritis Mother    • Thyroid disease Mother    • Heart block Father    • Diabetes Father             • Hyperlipidemia Father    • Cancer Father         colon/    • Heart disease Father    • Coronary artery disease Other         reports that  has never smoked. she has never used smokeless tobacco. She reports that she does not drink alcohol or use drugs.    Allergies   Allergen Reactions   • Milk-Related Compounds GI Intolerance   • Latex Rash     Tape and adhesive bandages cause rash            Current Outpatient Medications:   •  calcium carbonate (TUMS) 500 MG chewable tablet, Tums 200 mg calcium (500 mg) chewable tablet  Two times a day, Disp: , Rfl:   •  cimetidine (TAGAMET) 200 MG tablet, Take 200 mg by mouth Daily As Needed., Disp: , Rfl:   •  metoprolol succinate XL (TOPROL XL) 25 MG 24 hr tablet, Take 1 tablet by mouth Daily., Disp: 90 tablet, Rfl: 3  •  Multiple Vitamins-Minerals (MULTIVITAMIN ADULT PO), Take 1 tablet by mouth Daily., Disp: , Rfl:   •  naproxen sodium (ALEVE) 220 MG tablet, , Disp: , Rfl:   •  Omega-3 Fatty Acids (FISH OIL) 1200 MG  "capsule delayed-release, Take 1 capsule by mouth Daily., Disp: , Rfl:   •  omeprazole (PRILOSEC) 20 MG capsule, Prilosec 20 mg capsule,delayed release  Daily, Disp: , Rfl:   •  simvastatin (ZOCOR) 20 MG tablet, Take 1 tablet by mouth Every Night., Disp: 90 tablet, Rfl: 3  •  tacrolimus (PROGRAF) 1 MG capsule, Apply 1 mg to the mouth or throat Take As Directed. Empty contents of 1 capsule into 1 L distilled water; Once dissolved, swish and spit up to 4 times a day., Disp: , Rfl:   •  lisinopril-hydrochlorothiazide (PRINZIDE,ZESTORETIC) 20-12.5 MG per tablet, Take 1 tablet by mouth Daily., Disp: 30 tablet, Rfl: 5      Objective   Blood pressure 150/78, pulse 73, temperature 97.9 °F (36.6 °C), temperature source Temporal, resp. rate 16, height 165.1 cm (65\"), weight 73.3 kg (161 lb 8 oz), SpO2 99 %, not currently breastfeeding.    Physical Exam   Constitutional: She is oriented to person, place, and time. She appears well-developed and well-nourished. No distress.   HENT:   Head: Normocephalic and atraumatic.   Right Ear: External ear normal.   Left Ear: External ear normal.   Nose: Nose normal.   Mouth/Throat: Oropharynx is clear and moist.   Eyes: Conjunctivae and EOM are normal. Pupils are equal, round, and reactive to light.   Neck: Neck supple. No thyromegaly present.   Cardiovascular: Normal rate, regular rhythm and normal heart sounds.   Pulmonary/Chest: Effort normal and breath sounds normal. No respiratory distress.   Abdominal: Soft. Bowel sounds are normal. She exhibits no distension. There is no tenderness. There is no rebound.   Musculoskeletal: Normal range of motion. She exhibits no edema.   Lymphadenopathy:     She has no cervical adenopathy.   Neurological: She is alert and oriented to person, place, and time.   No gross motor or sensory deficits   Skin: Skin is warm. She is not diaphoretic.   Psychiatric: She has a normal mood and affect.   Nursing note and vitals reviewed.        Results for orders " placed or performed in visit on 01/07/19   Comprehensive Metabolic Panel   Result Value Ref Range    Glucose 89 74 - 98 mg/dL    BUN 20 7 - 20 mg/dL    Creatinine 0.70 0.60 - 1.30 mg/dL    eGFR Non African Am 83 >60 mL/min/1.73    eGFR African Am 101 >60 mL/min/1.73    BUN/Creatinine Ratio 28.6 (H) 7.1 - 23.5    Sodium 140 137 - 145 mmol/L    Potassium 3.8 3.5 - 5.1 mmol/L    Chloride 101 98 - 107 mmol/L    Total CO2 29.0 26.0 - 30.0 mmol/L    Calcium 9.8 8.4 - 10.2 mg/dL    Total Protein 6.9 6.3 - 8.2 g/dL    Albumin 4.70 3.50 - 5.00 g/dL    Globulin 2.2 gm/dL    A/G Ratio 2.1 (H) 1.0 - 2.0 g/dL    Total Bilirubin 0.7 0.2 - 1.3 mg/dL    Alkaline Phosphatase 68 38 - 126 U/L    AST (SGOT) 31 15 - 46 U/L    ALT (SGPT) 36 13 - 69 U/L   Lipid Panel   Result Value Ref Range    Total Cholesterol 196 0 - 199 mg/dL    Triglycerides 159 (H) <150 mg/dL    HDL Cholesterol 47 40 - 60 mg/dL    VLDL Cholesterol 31.8 mg/dL    LDL Cholesterol  117 (H) 0 - 99 mg/dL   CBC & Differential   Result Value Ref Range    WBC 9.00 4.80 - 10.80 10*3/mm3    RBC 4.93 4.20 - 5.40 10*6/mm3    Hemoglobin 14.0 12.0 - 16.0 g/dL    Hematocrit 44.4 37.0 - 47.0 %    MCV 90.1 81.0 - 99.0 fL    MCH 28.4 27.0 - 31.0 pg    MCHC 31.5 30.0 - 37.0 g/dL    RDW 14.1 11.5 - 14.5 %    Platelets 372 130 - 400 10*3/mm3    Neutrophil Rel % 55.2 37.0 - 80.0 %    Lymphocyte Rel % 35.2 10.0 - 50.0 %    Monocyte Rel % 7.9 0.0 - 12.0 %    Eosinophil Rel % 1.1 0.0 - 7.0 %    Basophil Rel % 0.4 0.0 - 2.5 %    Neutrophils Absolute 4.96 2.00 - 6.90 10*3/mm3    Lymphocytes Absolute 3.17 0.60 - 3.40 10*3/mm3    Monocytes Absolute 0.71 0.00 - 0.90 10*3/mm3    Eosinophils Absolute 0.10 0.00 - 0.70 10*3/mm3    Basophils Absolute 0.04 0.00 - 0.20 10*3/mm3    Immature Granulocyte Rel % 0.2 0.0 - 0.6 %    Immature Grans Absolute 0.02 0.00 - 0.06 10*3/mm3    nRBC 0.0 0.0 - 0.0 /100 WBC         Assessment/Plan   Sahara was seen today for establish care, hyperlipidemia and  hypertension.    Diagnoses and all orders for this visit:    Benign essential hypertension    Mixed hyperlipidemia  -     CBC & Differential  -     Comprehensive Metabolic Panel  -     Lipid Panel    Other orders  -     simvastatin (ZOCOR) 20 MG tablet; Take 1 tablet by mouth Every Night.  -     metoprolol succinate XL (TOPROL XL) 25 MG 24 hr tablet; Take 1 tablet by mouth Daily.  -     lisinopril-hydrochlorothiazide (PRINZIDE,ZESTORETIC) 20-12.5 MG per tablet; Take 1 tablet by mouth Daily.      Plan:  1.  Benign essential hypertension: Will  Stop current medication, start lisinopril /hctz 29-12.5 mg qd , low-sodium diet advised , continue toprol  2.mixed hyperlipidemia: will obtain   fasting CMP and lipid panel.  Diet and exercise counseled,  Will change to zocor 20 mg qhs , ldl 93           Shona Horn MD

## 2019-01-09 ENCOUNTER — TELEPHONE (OUTPATIENT)
Dept: INTERNAL MEDICINE | Facility: CLINIC | Age: 69
End: 2019-01-09

## 2019-01-09 NOTE — TELEPHONE ENCOUNTER
Patient stating that she had a missed call from our office and was wanting to find out who called and why. Did not see telephone note in chart. She states that she would like a return call if nurse or someone else in office was attempting to reach her. Her call back number if needed is [phone]

## 2019-01-10 ENCOUNTER — TELEPHONE (OUTPATIENT)
Dept: INTERNAL MEDICINE | Facility: CLINIC | Age: 69
End: 2019-01-10

## 2019-01-14 ENCOUNTER — OFFICE VISIT (OUTPATIENT)
Dept: INTERNAL MEDICINE | Facility: CLINIC | Age: 69
End: 2019-01-14

## 2019-01-14 VITALS
WEIGHT: 160.25 LBS | TEMPERATURE: 98.2 F | RESPIRATION RATE: 16 BRPM | BODY MASS INDEX: 26.67 KG/M2 | OXYGEN SATURATION: 99 % | HEART RATE: 71 BPM | DIASTOLIC BLOOD PRESSURE: 67 MMHG | SYSTOLIC BLOOD PRESSURE: 108 MMHG

## 2019-01-14 DIAGNOSIS — E78.2 MIXED HYPERLIPIDEMIA: ICD-10-CM

## 2019-01-14 DIAGNOSIS — I10 BENIGN ESSENTIAL HYPERTENSION: Primary | ICD-10-CM

## 2019-01-14 PROCEDURE — 99213 OFFICE O/P EST LOW 20 MIN: CPT | Performed by: INTERNAL MEDICINE

## 2019-01-14 NOTE — PROGRESS NOTES
Subjective   Sahara Hardwick is a 68 y.o. female.     Chief Complaint   Patient presents with   • Hypertension   • Hyperlipidemia       History of Present Illness   HPI: Patient is here to follow up on the blood pressure  The patient is taking the blood pressure medications as prescribed and has had no side effects. The patient is also here to follow up on the cholesterol and is trying to follow a diet. The patient  Had  lab work done .     Hyperlipidemia   Pertinent negatives include no chest pain or shortness of breath.   Hypertension   Pertinent negatives include no chest pain, palpitations or shortness of breath.    The following portions of the patient's history were reviewed and updated as appropriate: allergies, current medications, past family history, past medical history, past social history, past surgical history and problem list.    Review of Systems   Constitutional: Negative for appetite change, fatigue and fever.   HENT: Negative for congestion, ear discharge, ear pain, sinus pressure and sore throat.    Eyes: Negative for pain and discharge.   Respiratory: Negative for cough, chest tightness, shortness of breath and wheezing.    Cardiovascular: Negative for chest pain, palpitations and leg swelling.   Gastrointestinal: Negative for abdominal pain, blood in stool, constipation, diarrhea and nausea.   Endocrine: Negative for cold intolerance and heat intolerance.   Genitourinary: Negative for dysuria, flank pain and frequency.   Musculoskeletal: Negative for back pain and joint swelling.   Skin: Negative for color change.   Allergic/Immunologic: Negative for environmental allergies and food allergies.   Neurological: Negative for dizziness, weakness, numbness and headaches.   Hematological: Negative for adenopathy. Does not bruise/bleed easily.   Psychiatric/Behavioral: Negative for behavioral problems and dysphoric mood. The patient is not nervous/anxious.          Current Outpatient Medications:   •   calcium carbonate (TUMS) 500 MG chewable tablet, Tums 200 mg calcium (500 mg) chewable tablet  Two times a day, Disp: , Rfl:   •  cimetidine (TAGAMET) 200 MG tablet, Take 200 mg by mouth Daily As Needed., Disp: , Rfl:   •  lisinopril-hydrochlorothiazide (PRINZIDE,ZESTORETIC) 20-12.5 MG per tablet, Take 1 tablet by mouth Daily., Disp: 30 tablet, Rfl: 5  •  metoprolol succinate XL (TOPROL XL) 25 MG 24 hr tablet, Take 1 tablet by mouth Daily., Disp: 90 tablet, Rfl: 3  •  Multiple Vitamins-Minerals (MULTIVITAMIN ADULT PO), Take 1 tablet by mouth Daily., Disp: , Rfl:   •  Omega-3 Fatty Acids (FISH OIL) 1200 MG capsule delayed-release, Take 1 capsule by mouth Daily., Disp: , Rfl:   •  omeprazole (PRILOSEC) 20 MG capsule, Prilosec 20 mg capsule,delayed release  Daily, Disp: , Rfl:   •  simvastatin (ZOCOR) 20 MG tablet, Take 1 tablet by mouth Every Night., Disp: 90 tablet, Rfl: 3  •  tacrolimus (PROGRAF) 1 MG capsule, Apply 1 mg to the mouth or throat Take As Directed. Empty contents of 1 capsule into 1 L distilled water; Once dissolved, swish and spit up to 4 times a day., Disp: , Rfl:     Objective     Blood pressure 108/67, pulse 71, temperature 98.2 °F (36.8 °C), temperature source Temporal, resp. rate 16, weight 72.7 kg (160 lb 4 oz), SpO2 99 %, not currently breastfeeding.    Physical Exam   Constitutional: She is oriented to person, place, and time. She appears well-developed and well-nourished. No distress.   HENT:   Head: Normocephalic and atraumatic.   Right Ear: External ear normal.   Left Ear: External ear normal.   Nose: Nose normal.   Mouth/Throat: Oropharynx is clear and moist.   Eyes: Conjunctivae and EOM are normal. Pupils are equal, round, and reactive to light.   Neck: Neck supple. No thyromegaly present.   Cardiovascular: Normal rate, regular rhythm and normal heart sounds.   Pulmonary/Chest: Effort normal and breath sounds normal. No respiratory distress.   Abdominal: Soft. Bowel sounds are normal. She  exhibits no distension. There is no tenderness. There is no rebound.   Musculoskeletal: Normal range of motion. She exhibits no edema.   Lymphadenopathy:     She has no cervical adenopathy.   Neurological: She is alert and oriented to person, place, and time.   No gross motor or sensory deficits   Skin: Skin is warm. She is not diaphoretic.   Psychiatric: She has a normal mood and affect.   Nursing note and vitals reviewed.      Results for orders placed or performed in visit on 01/07/19   Comprehensive Metabolic Panel   Result Value Ref Range    Glucose 89 74 - 98 mg/dL    BUN 20 7 - 20 mg/dL    Creatinine 0.70 0.60 - 1.30 mg/dL    eGFR Non African Am 83 >60 mL/min/1.73    eGFR African Am 101 >60 mL/min/1.73    BUN/Creatinine Ratio 28.6 (H) 7.1 - 23.5    Sodium 140 137 - 145 mmol/L    Potassium 3.8 3.5 - 5.1 mmol/L    Chloride 101 98 - 107 mmol/L    Total CO2 29.0 26.0 - 30.0 mmol/L    Calcium 9.8 8.4 - 10.2 mg/dL    Total Protein 6.9 6.3 - 8.2 g/dL    Albumin 4.70 3.50 - 5.00 g/dL    Globulin 2.2 gm/dL    A/G Ratio 2.1 (H) 1.0 - 2.0 g/dL    Total Bilirubin 0.7 0.2 - 1.3 mg/dL    Alkaline Phosphatase 68 38 - 126 U/L    AST (SGOT) 31 15 - 46 U/L    ALT (SGPT) 36 13 - 69 U/L   Lipid Panel   Result Value Ref Range    Total Cholesterol 196 0 - 199 mg/dL    Triglycerides 159 (H) <150 mg/dL    HDL Cholesterol 47 40 - 60 mg/dL    VLDL Cholesterol 31.8 mg/dL    LDL Cholesterol  117 (H) 0 - 99 mg/dL   CBC & Differential   Result Value Ref Range    WBC 9.00 4.80 - 10.80 10*3/mm3    RBC 4.93 4.20 - 5.40 10*6/mm3    Hemoglobin 14.0 12.0 - 16.0 g/dL    Hematocrit 44.4 37.0 - 47.0 %    MCV 90.1 81.0 - 99.0 fL    MCH 28.4 27.0 - 31.0 pg    MCHC 31.5 30.0 - 37.0 g/dL    RDW 14.1 11.5 - 14.5 %    Platelets 372 130 - 400 10*3/mm3    Neutrophil Rel % 55.2 37.0 - 80.0 %    Lymphocyte Rel % 35.2 10.0 - 50.0 %    Monocyte Rel % 7.9 0.0 - 12.0 %    Eosinophil Rel % 1.1 0.0 - 7.0 %    Basophil Rel % 0.4 0.0 - 2.5 %    Neutrophils Absolute  4.96 2.00 - 6.90 10*3/mm3    Lymphocytes Absolute 3.17 0.60 - 3.40 10*3/mm3    Monocytes Absolute 0.71 0.00 - 0.90 10*3/mm3    Eosinophils Absolute 0.10 0.00 - 0.70 10*3/mm3    Basophils Absolute 0.04 0.00 - 0.20 10*3/mm3    Immature Granulocyte Rel % 0.2 0.0 - 0.6 %    Immature Grans Absolute 0.02 0.00 - 0.06 10*3/mm3    nRBC 0.0 0.0 - 0.0 /100 WBC         Assessment/Plan   Sahara was seen today for hypertension and hyperlipidemia.    Diagnoses and all orders for this visit:    Benign essential hypertension    Mixed hyperlipidemia  -     CBC & Differential  -     Comprehensive Metabolic Panel  -     Lipid Panel          Plan:  1.  Benign essential hypertension: Will continue current medication, low-sodium diet advised , monitor vitals   2.mixed hyperlipidemia:  reviewed  fasting CMP and lipid panel.  Diet and exercise counseled,  Will continue current medications - zocor 20 mg qhs and ldl 117           Shona Horn MD

## 2019-02-05 ENCOUNTER — OFFICE VISIT (OUTPATIENT)
Dept: INTERNAL MEDICINE | Facility: CLINIC | Age: 69
End: 2019-02-05

## 2019-02-05 VITALS
TEMPERATURE: 97.7 F | DIASTOLIC BLOOD PRESSURE: 81 MMHG | BODY MASS INDEX: 26.99 KG/M2 | SYSTOLIC BLOOD PRESSURE: 127 MMHG | HEIGHT: 65 IN | OXYGEN SATURATION: 98 % | WEIGHT: 162 LBS | HEART RATE: 83 BPM

## 2019-02-05 DIAGNOSIS — I10 BENIGN ESSENTIAL HYPERTENSION: Primary | ICD-10-CM

## 2019-02-05 PROCEDURE — 99213 OFFICE O/P EST LOW 20 MIN: CPT | Performed by: INTERNAL MEDICINE

## 2019-02-05 RX ORDER — LISINOPRIL AND HYDROCHLOROTHIAZIDE 20; 12.5 MG/1; MG/1
1 TABLET ORAL DAILY
Qty: 90 TABLET | Refills: 1 | Status: SHIPPED | OUTPATIENT
Start: 2019-02-05 | End: 2019-08-28 | Stop reason: SDUPTHER

## 2019-02-05 NOTE — PROGRESS NOTES
Subjective   Sahara Hardwick is a 68 y.o. female.     Chief Complaint   Patient presents with   • Hypertension       History of Present Illness   HPI: Patient is here to follow up on the blood pressure  The patient is taking the blood pressure medications as prescribed and has had no side effects.   She has been half dose of toprol and she has brought in her blood pressure readings which have been reviewed today , she states  She is trying to follow a diet , she needs her medications refilled  Hypertension   Pertinent negatives include no chest pain, palpitations or shortness of breath.      The following portions of the patient's history were reviewed and updated as appropriate: allergies, current medications, past family history, past medical history, past social history, past surgical history and problem list.    Review of Systems   Constitutional: Negative for appetite change, fatigue and fever.   HENT: Negative for congestion, ear discharge, ear pain, sinus pressure and sore throat.    Eyes: Negative for pain and discharge.   Respiratory: Negative for cough, chest tightness, shortness of breath and wheezing.    Cardiovascular: Negative for chest pain, palpitations and leg swelling.   Gastrointestinal: Negative for abdominal pain, blood in stool, constipation, diarrhea and nausea.   Endocrine: Negative for cold intolerance and heat intolerance.   Genitourinary: Negative for dysuria, flank pain and frequency.   Musculoskeletal: Negative for back pain and joint swelling.   Skin: Negative for color change.   Allergic/Immunologic: Negative for environmental allergies and food allergies.   Neurological: Negative for dizziness, weakness, numbness and headaches.   Hematological: Negative for adenopathy. Does not bruise/bleed easily.   Psychiatric/Behavioral: Negative for behavioral problems and dysphoric mood. The patient is not nervous/anxious.          Current Outpatient Medications:   •  calcium carbonate (TUMS) 500 MG  "chewable tablet, Tums 200 mg calcium (500 mg) chewable tablet  Two times a day, Disp: , Rfl:   •  cimetidine (TAGAMET) 200 MG tablet, Take 200 mg by mouth Daily As Needed., Disp: , Rfl:   •  lisinopril-hydrochlorothiazide (PRINZIDE,ZESTORETIC) 20-12.5 MG per tablet, Take 1 tablet by mouth Daily., Disp: 90 tablet, Rfl: 1  •  metoprolol succinate XL (TOPROL XL) 25 MG 24 hr tablet, Take 1 tablet by mouth Daily., Disp: 90 tablet, Rfl: 3  •  Multiple Vitamins-Minerals (MULTIVITAMIN ADULT PO), Take 1 tablet by mouth Daily., Disp: , Rfl:   •  Omega-3 Fatty Acids (FISH OIL) 1200 MG capsule delayed-release, Take 1 capsule by mouth Daily., Disp: , Rfl:   •  omeprazole (PRILOSEC) 20 MG capsule, Prilosec 20 mg capsule,delayed release  Daily, Disp: , Rfl:   •  simvastatin (ZOCOR) 20 MG tablet, Take 1 tablet by mouth Every Night., Disp: 90 tablet, Rfl: 3  •  tacrolimus (PROGRAF) 1 MG capsule, Apply 1 mg to the mouth or throat Take As Directed. Empty contents of 1 capsule into 1 L distilled water; Once dissolved, swish and spit up to 4 times a day., Disp: , Rfl:     Objective     Blood pressure 127/81, pulse 83, temperature 97.7 °F (36.5 °C), height 165.1 cm (65\"), weight 73.5 kg (162 lb), SpO2 98 %, not currently breastfeeding.    Physical Exam   Constitutional: She is oriented to person, place, and time. She appears well-developed and well-nourished. No distress.   HENT:   Head: Normocephalic and atraumatic.   Right Ear: External ear normal.   Left Ear: External ear normal.   Nose: Nose normal.   Mouth/Throat: Oropharynx is clear and moist.   Eyes: Conjunctivae and EOM are normal. Pupils are equal, round, and reactive to light.   Neck: Neck supple. No thyromegaly present.   Cardiovascular: Normal rate, regular rhythm and normal heart sounds.   Pulmonary/Chest: Effort normal and breath sounds normal. No respiratory distress.   Abdominal: Soft. Bowel sounds are normal. She exhibits no distension. There is no tenderness. There is " no rebound.   Musculoskeletal: Normal range of motion. She exhibits no edema.   Lymphadenopathy:     She has no cervical adenopathy.   Neurological: She is alert and oriented to person, place, and time.   No gross motor or sensory deficits   Skin: Skin is warm. She is not diaphoretic.   Psychiatric: She has a normal mood and affect.   Nursing note and vitals reviewed.    Patient's Body mass index is 26.96 kg/m². BMI is within normal parameters. No follow-up required..      Results for orders placed or performed in visit on 01/07/19   Comprehensive Metabolic Panel   Result Value Ref Range    Glucose 89 74 - 98 mg/dL    BUN 20 7 - 20 mg/dL    Creatinine 0.70 0.60 - 1.30 mg/dL    eGFR Non African Am 83 >60 mL/min/1.73    eGFR African Am 101 >60 mL/min/1.73    BUN/Creatinine Ratio 28.6 (H) 7.1 - 23.5    Sodium 140 137 - 145 mmol/L    Potassium 3.8 3.5 - 5.1 mmol/L    Chloride 101 98 - 107 mmol/L    Total CO2 29.0 26.0 - 30.0 mmol/L    Calcium 9.8 8.4 - 10.2 mg/dL    Total Protein 6.9 6.3 - 8.2 g/dL    Albumin 4.70 3.50 - 5.00 g/dL    Globulin 2.2 gm/dL    A/G Ratio 2.1 (H) 1.0 - 2.0 g/dL    Total Bilirubin 0.7 0.2 - 1.3 mg/dL    Alkaline Phosphatase 68 38 - 126 U/L    AST (SGOT) 31 15 - 46 U/L    ALT (SGPT) 36 13 - 69 U/L   Lipid Panel   Result Value Ref Range    Total Cholesterol 196 0 - 199 mg/dL    Triglycerides 159 (H) <150 mg/dL    HDL Cholesterol 47 40 - 60 mg/dL    VLDL Cholesterol 31.8 mg/dL    LDL Cholesterol  117 (H) 0 - 99 mg/dL   CBC & Differential   Result Value Ref Range    WBC 9.00 4.80 - 10.80 10*3/mm3    RBC 4.93 4.20 - 5.40 10*6/mm3    Hemoglobin 14.0 12.0 - 16.0 g/dL    Hematocrit 44.4 37.0 - 47.0 %    MCV 90.1 81.0 - 99.0 fL    MCH 28.4 27.0 - 31.0 pg    MCHC 31.5 30.0 - 37.0 g/dL    RDW 14.1 11.5 - 14.5 %    Platelets 372 130 - 400 10*3/mm3    Neutrophil Rel % 55.2 37.0 - 80.0 %    Lymphocyte Rel % 35.2 10.0 - 50.0 %    Monocyte Rel % 7.9 0.0 - 12.0 %    Eosinophil Rel % 1.1 0.0 - 7.0 %    Basophil  Rel % 0.4 0.0 - 2.5 %    Neutrophils Absolute 4.96 2.00 - 6.90 10*3/mm3    Lymphocytes Absolute 3.17 0.60 - 3.40 10*3/mm3    Monocytes Absolute 0.71 0.00 - 0.90 10*3/mm3    Eosinophils Absolute 0.10 0.00 - 0.70 10*3/mm3    Basophils Absolute 0.04 0.00 - 0.20 10*3/mm3    Immature Granulocyte Rel % 0.2 0.0 - 0.6 %    Immature Grans Absolute 0.02 0.00 - 0.06 10*3/mm3    nRBC 0.0 0.0 - 0.0 /100 WBC         Assessment/Plan   Sahara was seen today for hypertension.    Diagnoses and all orders for this visit:    Benign essential hypertension    Other orders  -     lisinopril-hydrochlorothiazide (PRINZIDE,ZESTORETIC) 20-12.5 MG per tablet; Take 1 tablet by mouth Daily.      Plan:  1.  Benign essential hypertension: Will continue current medication, low-sodium diet advised            Shona Horn MD

## 2019-02-25 ENCOUNTER — TELEPHONE (OUTPATIENT)
Dept: INTERNAL MEDICINE | Facility: CLINIC | Age: 69
End: 2019-02-25

## 2019-02-25 DIAGNOSIS — Z12.11 SCREENING FOR COLON CANCER: Primary | ICD-10-CM

## 2019-02-25 NOTE — TELEPHONE ENCOUNTER
Patient called and states she would like to get an order to dr charlotte burton for a colonoscopy. She has seen her in the past. Her office number is [phone].

## 2019-03-01 DIAGNOSIS — Z12.12 SCREENING FOR COLORECTAL CANCER: Primary | ICD-10-CM

## 2019-03-01 DIAGNOSIS — Z12.11 SCREENING FOR COLORECTAL CANCER: Primary | ICD-10-CM

## 2019-04-15 ENCOUNTER — OFFICE VISIT (OUTPATIENT)
Dept: INTERNAL MEDICINE | Facility: CLINIC | Age: 69
End: 2019-04-15

## 2019-04-15 VITALS
RESPIRATION RATE: 16 BRPM | DIASTOLIC BLOOD PRESSURE: 87 MMHG | WEIGHT: 158 LBS | SYSTOLIC BLOOD PRESSURE: 160 MMHG | HEIGHT: 64 IN | BODY MASS INDEX: 26.98 KG/M2 | TEMPERATURE: 96.8 F | OXYGEN SATURATION: 98 % | HEART RATE: 82 BPM

## 2019-04-15 DIAGNOSIS — M54.32 SCIATICA OF LEFT SIDE: ICD-10-CM

## 2019-04-15 DIAGNOSIS — I10 BENIGN ESSENTIAL HYPERTENSION: Primary | ICD-10-CM

## 2019-04-15 DIAGNOSIS — M25.552 PAIN IN JOINT OF LEFT HIP: ICD-10-CM

## 2019-04-15 DIAGNOSIS — E78.2 MIXED HYPERLIPIDEMIA: ICD-10-CM

## 2019-04-15 PROCEDURE — 96372 THER/PROPH/DIAG INJ SC/IM: CPT | Performed by: INTERNAL MEDICINE

## 2019-04-15 PROCEDURE — 99214 OFFICE O/P EST MOD 30 MIN: CPT | Performed by: INTERNAL MEDICINE

## 2019-04-15 RX ORDER — KETOROLAC TROMETHAMINE 30 MG/ML
30 INJECTION, SOLUTION INTRAMUSCULAR; INTRAVENOUS ONCE
Status: COMPLETED | OUTPATIENT
Start: 2019-04-15 | End: 2019-04-15

## 2019-04-15 RX ORDER — METHYLPREDNISOLONE SODIUM SUCCINATE 125 MG/2ML
125 INJECTION, POWDER, LYOPHILIZED, FOR SOLUTION INTRAMUSCULAR; INTRAVENOUS ONCE
Status: COMPLETED | OUTPATIENT
Start: 2019-04-15 | End: 2019-04-15

## 2019-04-15 RX ORDER — METHYLPREDNISOLONE 4 MG/1
TABLET ORAL
Qty: 21 TABLET | Refills: 0 | Status: SHIPPED | OUTPATIENT
Start: 2019-04-15 | End: 2019-05-07

## 2019-04-15 RX ADMIN — METHYLPREDNISOLONE SODIUM SUCCINATE 125 MG: 125 INJECTION, POWDER, LYOPHILIZED, FOR SOLUTION INTRAMUSCULAR; INTRAVENOUS at 13:04

## 2019-04-15 RX ADMIN — KETOROLAC TROMETHAMINE 30 MG: 30 INJECTION, SOLUTION INTRAMUSCULAR; INTRAVENOUS at 13:00

## 2019-04-15 NOTE — PROGRESS NOTES
Subjective   Sahara Hardwick is a 68 y.o. female.     Chief Complaint   Patient presents with   • Sciatica     fasting   • Hypertension   • Joint Pain     left hip   • Hyperlipidemia       History of Present Illness   HPI: Patient is here to follow up on the blood pressure  The patient is taking the blood pressure medications as prescribed and has had no side effects. The patient is also here to follow up on the cholesterol and is trying to follow a diet. The patient is   due to get lab work done .  The patient also  Complains of left hip and left leg pain with pain her buttock area  And she states it is radiating down  .   Hyperlipidemia   Pertinent negatives include no chest pain or shortness of breath.   Hypertension   Pertinent negatives include no chest pain, palpitations or shortness of breath.    The following portions of the patient's history were reviewed and updated as appropriate: allergies, current medications, past family history, past medical history, past social history, past surgical history and problem list.    Review of Systems   Constitutional: Negative for appetite change, fatigue and fever.   HENT: Negative for congestion, ear discharge, ear pain, sinus pressure and sore throat.    Eyes: Negative for pain and discharge.   Respiratory: Negative for cough, chest tightness, shortness of breath and wheezing.    Cardiovascular: Negative for chest pain, palpitations and leg swelling.   Gastrointestinal: Negative for abdominal pain, blood in stool, constipation, diarrhea and nausea.   Endocrine: Negative for cold intolerance and heat intolerance.   Genitourinary: Negative for dysuria, flank pain and frequency.   Musculoskeletal: Positive for arthralgias. Negative for back pain and joint swelling.        Left hip   Skin: Negative for color change.   Allergic/Immunologic: Negative for environmental allergies and food allergies.   Neurological: Negative for dizziness, weakness, numbness and headaches.  "  Hematological: Negative for adenopathy. Does not bruise/bleed easily.   Psychiatric/Behavioral: Negative for behavioral problems and dysphoric mood. The patient is not nervous/anxious.          Current Outpatient Medications:   •  calcium carbonate (TUMS) 500 MG chewable tablet, Tums 200 mg calcium (500 mg) chewable tablet  Two times a day, Disp: , Rfl:   •  cimetidine (TAGAMET) 200 MG tablet, Take 200 mg by mouth Daily As Needed., Disp: , Rfl:   •  lisinopril-hydrochlorothiazide (PRINZIDE,ZESTORETIC) 20-12.5 MG per tablet, Take 1 tablet by mouth Daily., Disp: 90 tablet, Rfl: 1  •  Multiple Vitamins-Minerals (MULTIVITAMIN ADULT PO), Take 1 tablet by mouth Daily., Disp: , Rfl:   •  Omega-3 Fatty Acids (FISH OIL) 1200 MG capsule delayed-release, Take 1 capsule by mouth Daily., Disp: , Rfl:   •  omeprazole (PRILOSEC) 20 MG capsule, Prilosec 20 mg capsule,delayed release  Daily, Disp: , Rfl:   •  simvastatin (ZOCOR) 20 MG tablet, Take 1 tablet by mouth Every Night., Disp: 90 tablet, Rfl: 3  •  tacrolimus (PROGRAF) 1 MG capsule, Apply 1 mg to the mouth or throat Take As Directed. Empty contents of 1 capsule into 1 L distilled water; Once dissolved, swish and spit up to 4 times a day., Disp: , Rfl:   •  methylPREDNISolone (MEDROL, BIJAN,) 4 MG tablet, Take as directed on package instructions., Disp: 21 tablet, Rfl: 0  •  metoprolol succinate XL (TOPROL XL) 25 MG 24 hr tablet, Take 1 tablet by mouth Daily., Disp: 90 tablet, Rfl: 3    Objective     Blood pressure 160/87, pulse 82, temperature 96.8 °F (36 °C), resp. rate 16, height 162.1 cm (63.82\"), weight 71.7 kg (158 lb), SpO2 98 %, not currently breastfeeding.    Physical Exam   Constitutional: She is oriented to person, place, and time. She appears well-developed and well-nourished. No distress.   HENT:   Head: Normocephalic and atraumatic.   Right Ear: External ear normal.   Left Ear: External ear normal.   Nose: Nose normal.   Mouth/Throat: Oropharynx is clear and " moist.   Eyes: Conjunctivae and EOM are normal. Pupils are equal, round, and reactive to light.   Neck: Neck supple. No thyromegaly present.   Cardiovascular: Normal rate, regular rhythm and normal heart sounds.   Pulmonary/Chest: Effort normal and breath sounds normal. No respiratory distress.   Abdominal: Soft. Bowel sounds are normal. She exhibits no distension. There is no tenderness. There is no rebound.   Musculoskeletal: She exhibits tenderness. She exhibits no edema.   Left hip   Lymphadenopathy:     She has no cervical adenopathy.   Neurological: She is alert and oriented to person, place, and time.   No gross motor or sensory deficits   Skin: Skin is warm. She is not diaphoretic.   Psychiatric: She has a normal mood and affect.   Nursing note and vitals reviewed.    Patient's Body mass index is 27.27 kg/m². BMI is above normal parameters. Recommendations include: educational material, exercise counseling and nutrition counseling.      Results for orders placed or performed in visit on 01/07/19   Comprehensive Metabolic Panel   Result Value Ref Range    Glucose 89 74 - 98 mg/dL    BUN 20 7 - 20 mg/dL    Creatinine 0.70 0.60 - 1.30 mg/dL    eGFR Non African Am 83 >60 mL/min/1.73    eGFR African Am 101 >60 mL/min/1.73    BUN/Creatinine Ratio 28.6 (H) 7.1 - 23.5    Sodium 140 137 - 145 mmol/L    Potassium 3.8 3.5 - 5.1 mmol/L    Chloride 101 98 - 107 mmol/L    Total CO2 29.0 26.0 - 30.0 mmol/L    Calcium 9.8 8.4 - 10.2 mg/dL    Total Protein 6.9 6.3 - 8.2 g/dL    Albumin 4.70 3.50 - 5.00 g/dL    Globulin 2.2 gm/dL    A/G Ratio 2.1 (H) 1.0 - 2.0 g/dL    Total Bilirubin 0.7 0.2 - 1.3 mg/dL    Alkaline Phosphatase 68 38 - 126 U/L    AST (SGOT) 31 15 - 46 U/L    ALT (SGPT) 36 13 - 69 U/L   Lipid Panel   Result Value Ref Range    Total Cholesterol 196 0 - 199 mg/dL    Triglycerides 159 (H) <150 mg/dL    HDL Cholesterol 47 40 - 60 mg/dL    VLDL Cholesterol 31.8 mg/dL    LDL Cholesterol  117 (H) 0 - 99 mg/dL   CBC &  Differential   Result Value Ref Range    WBC 9.00 4.80 - 10.80 10*3/mm3    RBC 4.93 4.20 - 5.40 10*6/mm3    Hemoglobin 14.0 12.0 - 16.0 g/dL    Hematocrit 44.4 37.0 - 47.0 %    MCV 90.1 81.0 - 99.0 fL    MCH 28.4 27.0 - 31.0 pg    MCHC 31.5 30.0 - 37.0 g/dL    RDW 14.1 11.5 - 14.5 %    Platelets 372 130 - 400 10*3/mm3    Neutrophil Rel % 55.2 37.0 - 80.0 %    Lymphocyte Rel % 35.2 10.0 - 50.0 %    Monocyte Rel % 7.9 0.0 - 12.0 %    Eosinophil Rel % 1.1 0.0 - 7.0 %    Basophil Rel % 0.4 0.0 - 2.5 %    Neutrophils Absolute 4.96 2.00 - 6.90 10*3/mm3    Lymphocytes Absolute 3.17 0.60 - 3.40 10*3/mm3    Monocytes Absolute 0.71 0.00 - 0.90 10*3/mm3    Eosinophils Absolute 0.10 0.00 - 0.70 10*3/mm3    Basophils Absolute 0.04 0.00 - 0.20 10*3/mm3    Immature Granulocyte Rel % 0.2 0.0 - 0.6 %    Immature Grans Absolute 0.02 0.00 - 0.06 10*3/mm3    nRBC 0.0 0.0 - 0.0 /100 WBC         Assessment/Plan   Sahara was seen today for sciatica, hypertension, joint pain and hyperlipidemia.    Diagnoses and all orders for this visit:    Benign essential hypertension    Mixed hyperlipidemia    Pain in joint of left hip  -     ketorolac (TORADOL) injection 30 mg  -     methylPREDNISolone sodium succinate (SOLU-Medrol) injection 125 mg    Sciatica of left side  -     ketorolac (TORADOL) injection 30 mg  -     methylPREDNISolone sodium succinate (SOLU-Medrol) injection 125 mg    Other orders  -     methylPREDNISolone (MEDROL, BIJAN,) 4 MG tablet; Take as directed on package instructions.      Plan:  1.  Benign essential hypertension: Will continue current medication, low-sodium diet advised  2.mixed hyperlipidemia: will obtain   fasting CMP and lipid panel.  Diet and exercise counseled,  Will continue current medications  3.  Left side sciatica : im toradol and im solumedrol given today , start medrol dose pack  4. Left hip pain :  im toradol and im solumedrol given today , start medrol dose pack       Shona Horn MD

## 2019-05-07 ENCOUNTER — OFFICE VISIT (OUTPATIENT)
Dept: INTERNAL MEDICINE | Facility: CLINIC | Age: 69
End: 2019-05-07

## 2019-05-07 VITALS
RESPIRATION RATE: 16 BRPM | WEIGHT: 158 LBS | BODY MASS INDEX: 26.98 KG/M2 | OXYGEN SATURATION: 98 % | SYSTOLIC BLOOD PRESSURE: 148 MMHG | HEIGHT: 64 IN | HEART RATE: 77 BPM | TEMPERATURE: 97 F | DIASTOLIC BLOOD PRESSURE: 85 MMHG

## 2019-05-07 DIAGNOSIS — E78.2 MIXED HYPERLIPIDEMIA: ICD-10-CM

## 2019-05-07 DIAGNOSIS — I10 BENIGN ESSENTIAL HYPERTENSION: Primary | ICD-10-CM

## 2019-05-07 LAB
ALBUMIN SERPL-MCNC: 4.3 G/DL (ref 3.5–5)
ALBUMIN/GLOB SERPL: 1.9 G/DL (ref 1–2)
ALP SERPL-CCNC: 65 U/L (ref 38–126)
ALT SERPL-CCNC: 47 U/L (ref 13–69)
AST SERPL-CCNC: 33 U/L (ref 15–46)
BASOPHILS # BLD AUTO: 0.03 10*3/MM3 (ref 0–0.2)
BASOPHILS NFR BLD AUTO: 0.4 % (ref 0–1.5)
BILIRUB SERPL-MCNC: 0.6 MG/DL (ref 0.2–1.3)
BUN SERPL-MCNC: 13 MG/DL (ref 7–20)
BUN/CREAT SERPL: 18.6 (ref 7.1–23.5)
CALCIUM SERPL-MCNC: 9.5 MG/DL (ref 8.4–10.2)
CHLORIDE SERPL-SCNC: 100 MMOL/L (ref 98–107)
CHOLEST SERPL-MCNC: 203 MG/DL (ref 0–199)
CO2 SERPL-SCNC: 28 MMOL/L (ref 26–30)
CREAT SERPL-MCNC: 0.7 MG/DL (ref 0.6–1.3)
EOSINOPHIL # BLD AUTO: 0.09 10*3/MM3 (ref 0–0.4)
EOSINOPHIL NFR BLD AUTO: 1.2 % (ref 0.3–6.2)
ERYTHROCYTE [DISTWIDTH] IN BLOOD BY AUTOMATED COUNT: 14.5 % (ref 12.3–15.4)
GLOBULIN SER CALC-MCNC: 2.3 GM/DL
GLUCOSE SERPL-MCNC: 92 MG/DL (ref 74–98)
HCT VFR BLD AUTO: 41.2 % (ref 34–46.6)
HDLC SERPL-MCNC: 44 MG/DL (ref 40–60)
HGB BLD-MCNC: 12.9 G/DL (ref 12–15.9)
IMM GRANULOCYTES # BLD AUTO: 0.02 10*3/MM3 (ref 0–0.05)
IMM GRANULOCYTES NFR BLD AUTO: 0.3 % (ref 0–0.5)
LDLC SERPL CALC-MCNC: 115 MG/DL (ref 0–99)
LYMPHOCYTES # BLD AUTO: 2.51 10*3/MM3 (ref 0.7–3.1)
LYMPHOCYTES NFR BLD AUTO: 34.2 % (ref 19.6–45.3)
MCH RBC QN AUTO: 28.4 PG (ref 26.6–33)
MCHC RBC AUTO-ENTMCNC: 31.3 G/DL (ref 31.5–35.7)
MCV RBC AUTO: 90.7 FL (ref 79–97)
MONOCYTES # BLD AUTO: 0.59 10*3/MM3 (ref 0.1–0.9)
MONOCYTES NFR BLD AUTO: 8 % (ref 5–12)
NEUTROPHILS # BLD AUTO: 4.09 10*3/MM3 (ref 1.7–7)
NEUTROPHILS NFR BLD AUTO: 55.9 % (ref 42.7–76)
NRBC BLD AUTO-RTO: 0 /100 WBC (ref 0–0.2)
PLATELET # BLD AUTO: 366 10*3/MM3 (ref 140–450)
POTASSIUM SERPL-SCNC: 4.4 MMOL/L (ref 3.5–5.1)
PROT SERPL-MCNC: 6.6 G/DL (ref 6.3–8.2)
RBC # BLD AUTO: 4.54 10*6/MM3 (ref 3.77–5.28)
SODIUM SERPL-SCNC: 139 MMOL/L (ref 137–145)
TRIGL SERPL-MCNC: 219 MG/DL
VLDLC SERPL CALC-MCNC: 43.8 MG/DL
WBC # BLD AUTO: 7.33 10*3/MM3 (ref 3.4–10.8)

## 2019-05-07 PROCEDURE — 99213 OFFICE O/P EST LOW 20 MIN: CPT | Performed by: INTERNAL MEDICINE

## 2019-05-07 NOTE — PROGRESS NOTES
Subjective   Sahara Hradwick is a 68 y.o. female.     Chief Complaint   Patient presents with   • Hypertension     fasting   • Hyperlipidemia       History of Present Illness   HPI: Patient is here to follow up on the blood pressure  The patient is taking the blood pressure medications as prescribed and has had no side effects. She has stopped toprol and hasnt taken it for months,she has brought in her home blood pressure readings which are reviewed and are stable , The patient is also here to follow up on the cholesterol and is trying to follow a diet. The patient is   due to get lab work done .    She is undergoing colonoscopy next Thursday with dr burton  Hyperlipidemia   Pertinent negatives include no chest pain or shortness of breath.   Hypertension   Pertinent negatives include no chest pain, palpitations or shortness of breath.    The following portions of the patient's history were reviewed and updated as appropriate: allergies, current medications, past family history, past medical history, past social history, past surgical history and problem list.    Review of Systems   Constitutional: Negative for appetite change, fatigue and fever.   HENT: Negative for congestion, ear discharge, ear pain, sinus pressure and sore throat.    Eyes: Negative for pain and discharge.   Respiratory: Negative for cough, chest tightness, shortness of breath and wheezing.    Cardiovascular: Negative for chest pain, palpitations and leg swelling.   Gastrointestinal: Negative for abdominal pain, blood in stool, constipation, diarrhea and nausea.   Endocrine: Negative for cold intolerance and heat intolerance.   Genitourinary: Negative for dysuria, flank pain and frequency.   Musculoskeletal: Negative for back pain and joint swelling.   Skin: Negative for color change.   Allergic/Immunologic: Negative for environmental allergies and food allergies.   Neurological: Negative for dizziness, weakness, numbness and headaches.  "  Hematological: Negative for adenopathy. Does not bruise/bleed easily.   Psychiatric/Behavioral: Negative for behavioral problems and dysphoric mood. The patient is not nervous/anxious.          Current Outpatient Medications:   •  calcium carbonate (TUMS) 500 MG chewable tablet, Tums 200 mg calcium (500 mg) chewable tablet  Two times a day, Disp: , Rfl:   •  cimetidine (TAGAMET) 200 MG tablet, Take 200 mg by mouth Daily As Needed., Disp: , Rfl:   •  lisinopril-hydrochlorothiazide (PRINZIDE,ZESTORETIC) 20-12.5 MG per tablet, Take 1 tablet by mouth Daily., Disp: 90 tablet, Rfl: 1  •  Multiple Vitamins-Minerals (MULTIVITAMIN ADULT PO), Take 1 tablet by mouth Daily., Disp: , Rfl:   •  Omega-3 Fatty Acids (FISH OIL) 1200 MG capsule delayed-release, Take 1 capsule by mouth Daily., Disp: , Rfl:   •  omeprazole (PRILOSEC) 20 MG capsule, Prilosec 20 mg capsule,delayed release  Daily, Disp: , Rfl:   •  simvastatin (ZOCOR) 20 MG tablet, Take 1 tablet by mouth Every Night., Disp: 90 tablet, Rfl: 3  •  tacrolimus (PROGRAF) 1 MG capsule, Apply 1 mg to the mouth or throat Take As Directed. Empty contents of 1 capsule into 1 L distilled water; Once dissolved, swish and spit up to 4 times a day., Disp: , Rfl:     Objective     Blood pressure 148/85, pulse 77, temperature 97 °F (36.1 °C), resp. rate 16, height 162.1 cm (63.82\"), weight 71.7 kg (158 lb), SpO2 98 %, not currently breastfeeding.    Physical Exam   Constitutional: She is oriented to person, place, and time. She appears well-developed and well-nourished. No distress.   HENT:   Head: Normocephalic and atraumatic.   Right Ear: External ear normal.   Left Ear: External ear normal.   Nose: Nose normal.   Mouth/Throat: Oropharynx is clear and moist.   Eyes: Conjunctivae and EOM are normal. Pupils are equal, round, and reactive to light.   Neck: Neck supple. No thyromegaly present.   Cardiovascular: Normal rate, regular rhythm and normal heart sounds.   Pulmonary/Chest: " Effort normal and breath sounds normal. No respiratory distress.   Abdominal: Soft. Bowel sounds are normal. She exhibits no distension. There is no tenderness. There is no rebound.   Musculoskeletal: Normal range of motion. She exhibits no edema.   Lymphadenopathy:     She has no cervical adenopathy.   Neurological: She is alert and oriented to person, place, and time.   No gross motor or sensory deficits   Skin: Skin is warm. She is not diaphoretic.   Psychiatric: She has a normal mood and affect.   Nursing note and vitals reviewed.    Patient's Body mass index is 27.27 kg/m². BMI is within normal parameters. No follow-up required..      Results for orders placed or performed in visit on 05/07/19   Comprehensive Metabolic Panel   Result Value Ref Range    Glucose 92 74 - 98 mg/dL    BUN 13 7 - 20 mg/dL    Creatinine 0.70 0.60 - 1.30 mg/dL    eGFR Non African Am 83 >60 mL/min/1.73    eGFR African Am 101 >60 mL/min/1.73    BUN/Creatinine Ratio 18.6 7.1 - 23.5    Sodium 139 137 - 145 mmol/L    Potassium 4.4 3.5 - 5.1 mmol/L    Chloride 100 98 - 107 mmol/L    Total CO2 28.0 26.0 - 30.0 mmol/L    Calcium 9.5 8.4 - 10.2 mg/dL    Total Protein 6.6 6.3 - 8.2 g/dL    Albumin 4.30 3.50 - 5.00 g/dL    Globulin 2.3 gm/dL    A/G Ratio 1.9 1.0 - 2.0 g/dL    Total Bilirubin 0.6 0.2 - 1.3 mg/dL    Alkaline Phosphatase 65 38 - 126 U/L    AST (SGOT) 33 15 - 46 U/L    ALT (SGPT) 47 13 - 69 U/L   Lipid Panel   Result Value Ref Range    Total Cholesterol 203 (H) 0 - 199 mg/dL    Triglycerides 219 (H) <150 mg/dL    HDL Cholesterol 44 40 - 60 mg/dL    VLDL Cholesterol 43.8 mg/dL    LDL Cholesterol  115 (H) 0 - 99 mg/dL   CBC & Differential   Result Value Ref Range    WBC 7.33 3.40 - 10.80 10*3/mm3    RBC 4.54 3.77 - 5.28 10*6/mm3    Hemoglobin 12.9 12.0 - 15.9 g/dL    Hematocrit 41.2 34.0 - 46.6 %    MCV 90.7 79.0 - 97.0 fL    MCH 28.4 26.6 - 33.0 pg    MCHC 31.3 (L) 31.5 - 35.7 g/dL    RDW 14.5 12.3 - 15.4 %    Platelets 366 140 - 450  10*3/mm3    Neutrophil Rel % 55.9 42.7 - 76.0 %    Lymphocyte Rel % 34.2 19.6 - 45.3 %    Monocyte Rel % 8.0 5.0 - 12.0 %    Eosinophil Rel % 1.2 0.3 - 6.2 %    Basophil Rel % 0.4 0.0 - 1.5 %    Neutrophils Absolute 4.09 1.70 - 7.00 10*3/mm3    Lymphocytes Absolute 2.51 0.70 - 3.10 10*3/mm3    Monocytes Absolute 0.59 0.10 - 0.90 10*3/mm3    Eosinophils Absolute 0.09 0.00 - 0.40 10*3/mm3    Basophils Absolute 0.03 0.00 - 0.20 10*3/mm3    Immature Granulocyte Rel % 0.3 0.0 - 0.5 %    Immature Grans Absolute 0.02 0.00 - 0.05 10*3/mm3    nRBC 0.0 0.0 - 0.2 /100 WBC         Assessment/Plan   Sahara was seen today for hypertension and hyperlipidemia.    Diagnoses and all orders for this visit:    Benign essential hypertension    Mixed hyperlipidemia  -     CBC & Differential  -     Comprehensive Metabolic Panel  -     Lipid Panel        Plan:  1.  Benign essential hypertension: Will continue current medication, low-sodium diet advised  2.mixed hyperlipidemia: will obtain   fasting CMP and lipid panel.  Diet and exercise counseled,  Will continue current medications           Shona Horn MD

## 2019-08-12 DIAGNOSIS — Z96.651 STATUS POST TOTAL RIGHT KNEE REPLACEMENT USING CEMENT: Primary | ICD-10-CM

## 2019-08-13 ENCOUNTER — OFFICE VISIT (OUTPATIENT)
Dept: ORTHOPEDIC SURGERY | Facility: CLINIC | Age: 69
End: 2019-08-13

## 2019-08-13 VITALS — BODY MASS INDEX: 27.29 KG/M2 | RESPIRATION RATE: 18 BRPM | HEIGHT: 63 IN | WEIGHT: 154 LBS

## 2019-08-13 DIAGNOSIS — Z96.651 STATUS POST TOTAL RIGHT KNEE REPLACEMENT USING CEMENT: Primary | ICD-10-CM

## 2019-08-13 PROCEDURE — 99212 OFFICE O/P EST SF 10 MIN: CPT | Performed by: PHYSICIAN ASSISTANT

## 2019-08-13 RX ORDER — OMEPRAZOLE 40 MG/1
CAPSULE, DELAYED RELEASE ORAL
COMMUNITY
Start: 2019-08-01 | End: 2019-08-13

## 2019-08-13 NOTE — PROGRESS NOTES
Subjective   Patient ID: Sahara Hardwick is a 68 y.o. right hand dominant female  Follow-up and Annual Exam of the Right Knee (right total knee replacement 2017)         History of Present Illness  Patient presents for yearly evaluation in regards to right total knee arthroplasty.  Patient states overall she is doing great.  She denies swelling to the right knee.  She denies constant and/or daily pain to the right knee.                                                 Past Medical History:   Diagnosis Date   • Allergic     seasonal   • Arthritis    • Celiac disease    • Diverticulitis    • Diverticulosis    • GERD (gastroesophageal reflux disease)    • High cholesterol    • Hypertension    • Kidney stone    • Osteopenia     osteopenia   • Sciatica 2019        Past Surgical History:   Procedure Laterality Date   • APPENDECTOMY     • CHOLECYSTECTOMY     • COLONOSCOPY     • COLONOSCOPY W/ POLYPECTOMY     • GALLBLADDER SURGERY     • HAND SURGERY      right middle finger arthritis nodule removed.   • HYSTERECTOMY     • JOINT REPLACEMENT  2017   • SC TOTAL KNEE ARTHROPLASTY Right 2017    Procedure: Elective right total knee replacement (BIOMET);  Surgeon: John Dewitt MD;  Location: [facility];  Service: Orthopedics       Family History   Problem Relation Age of Onset   • Osteoarthritis Mother    • Heart block Mother    • Cancer Mother         breast/    • Arthritis Mother    • Thyroid disease Mother    • Heart block Father    • Diabetes Father             • Hyperlipidemia Father    • Cancer Father         colon/    • Heart disease Father    • Coronary artery disease Other        Social History     Socioeconomic History   • Marital status:      Spouse name: Not on file   • Number of children: Not on file   • Years of education: Not on file   • Highest education level: Not on file   Occupational History   • Occupation: retired  "  Tobacco Use   • Smoking status: Never Smoker   • Smokeless tobacco: Never Used   Substance and Sexual Activity   • Alcohol use: No   • Drug use: No   • Sexual activity: Yes       I have reviewed all of the above social hx, family hx, surgical hx, medications, allergies & ROS and confirm that it is accurate.      Current Outpatient Medications:   •  cimetidine (TAGAMET) 200 MG tablet, Take 200 mg by mouth Daily As Needed., Disp: , Rfl:   •  lisinopril-hydrochlorothiazide (PRINZIDE,ZESTORETIC) 20-12.5 MG per tablet, Take 1 tablet by mouth Daily. (Patient taking differently: Take 1 tablet by mouth Daily. 1/2 tab daily), Disp: 90 tablet, Rfl: 1  •  Multiple Vitamins-Minerals (MULTIVITAMIN ADULT PO), Take 1 tablet by mouth Daily., Disp: , Rfl:   •  Omega-3 Fatty Acids (FISH OIL) 1200 MG capsule delayed-release, Take 1 capsule by mouth Daily., Disp: , Rfl:   •  simvastatin (ZOCOR) 20 MG tablet, Take 1 tablet by mouth Every Night., Disp: 90 tablet, Rfl: 3  •  tacrolimus (PROGRAF) 1 MG capsule, Apply 1 mg to the mouth or throat Take As Directed. Empty contents of 1 capsule into 1 L distilled water; Once dissolved, swish and spit up to 4 times a day., Disp: , Rfl:     Allergies   Allergen Reactions   • Milk-Related Compounds GI Intolerance   • Latex Rash     Tape and adhesive bandages cause rash        Review of Systems   Constitutional: Negative for diaphoresis, fever and unexpected weight change.   HENT: Negative for dental problem and sore throat.    Eyes: Negative for visual disturbance.   Respiratory: Negative for shortness of breath.    Cardiovascular: Negative for chest pain.   Gastrointestinal: Negative for abdominal pain, constipation, diarrhea, nausea and vomiting.   Genitourinary: Negative for difficulty urinating and frequency.   Neurological: Negative for headaches.   Hematological: Does not bruise/bleed easily.       Objective   Resp 18   Ht 160 cm (63\")   Wt 69.9 kg (154 lb)   LMP  (LMP Unknown)   BMI " 27.28 kg/m²    Physical Exam   Constitutional: She is oriented to person, place, and time. She appears well-developed.   Neck: No tracheal deviation present.   Pulmonary/Chest: Effort normal.   Neurological: She is alert and oriented to person, place, and time.   Psychiatric: She has a normal mood and affect.   Vitals reviewed.    Right Knee Exam     Muscle Strength   The patient has normal right knee strength.    Range of Motion   Extension: 0   Flexion: 120     Other   Erythema: absent  Scars: present           Extremity DVT signs are Negative on physical exam with negative Castillo sign, with no calf pain, with no palpable cords, with no increased pain with passive stretch/extension and with no skin tone change   Neurologic Exam     Mental Status   Oriented to person, place, and time.              Assessment/Plan   Independent Review of Radiographic Studies:    Indication to evaluate status of prosthesis and joint, and compared with prior imaging, shows no acute fracture or dislocation. Good position and alignment of prosthesis with no radiographic signs of loosening.       Procedures       Sahara was seen today for follow-up and annual exam.    Diagnoses and all orders for this visit:    Status post total right knee replacement using cement       Discussion of orthopedic goals  Risk, benefits, and merits of treatment alternatives reviewed with the patient and questions answered    Recommendations/Plan:  Exercise, medications, injections, other patient advice, and return appointment as noted.  Patient is encouraged to call or return for any issues or concerns.    FU PRN    Patient agreeable to call or return sooner for any concerns.           EMR Dragon-transcription disclaimer:  This encounter note is an electronic transcription of spoken language to printed text.  Electronic transcription of spoken language may permit erroneous or at times nonsensical words or phrases to be inadvertently transcribed.  Although I  have reviewed the note for such errors, some may still exist

## 2019-08-28 ENCOUNTER — OFFICE VISIT (OUTPATIENT)
Dept: INTERNAL MEDICINE | Facility: CLINIC | Age: 69
End: 2019-08-28

## 2019-08-28 VITALS
HEART RATE: 74 BPM | HEIGHT: 63 IN | OXYGEN SATURATION: 98 % | BODY MASS INDEX: 26.93 KG/M2 | WEIGHT: 152 LBS | TEMPERATURE: 96.9 F | SYSTOLIC BLOOD PRESSURE: 121 MMHG | DIASTOLIC BLOOD PRESSURE: 83 MMHG | RESPIRATION RATE: 16 BRPM

## 2019-08-28 DIAGNOSIS — E78.2 MIXED HYPERLIPIDEMIA: ICD-10-CM

## 2019-08-28 DIAGNOSIS — I10 BENIGN ESSENTIAL HYPERTENSION: Primary | ICD-10-CM

## 2019-08-28 DIAGNOSIS — K90.0 CELIAC DISEASE: ICD-10-CM

## 2019-08-28 PROCEDURE — 99214 OFFICE O/P EST MOD 30 MIN: CPT | Performed by: INTERNAL MEDICINE

## 2019-08-28 RX ORDER — LISINOPRIL AND HYDROCHLOROTHIAZIDE 20; 12.5 MG/1; MG/1
0.5 TABLET ORAL DAILY
Qty: 90 TABLET | Refills: 2 | Status: SHIPPED | OUTPATIENT
Start: 2019-08-28 | End: 2020-07-22 | Stop reason: SDUPTHER

## 2019-08-28 RX ORDER — LISINOPRIL AND HYDROCHLOROTHIAZIDE 20; 12.5 MG/1; MG/1
0.5 TABLET ORAL DAILY
Qty: 90 TABLET | Refills: 2 | OUTPATIENT
Start: 2019-08-28

## 2019-08-28 RX ORDER — OMEPRAZOLE 20 MG/1
1 CAPSULE, DELAYED RELEASE ORAL DAILY
COMMUNITY
Start: 2019-08-27 | End: 2022-11-09

## 2019-11-20 RX ORDER — SIMVASTATIN 20 MG
TABLET ORAL
Qty: 90 TABLET | Refills: 2 | Status: SHIPPED | OUTPATIENT
Start: 2019-11-20 | End: 2020-07-22 | Stop reason: SDUPTHER

## 2019-12-05 LAB
ALBUMIN SERPL-MCNC: 4.7 G/DL (ref 3.5–5.2)
ALBUMIN/GLOB SERPL: 2.4 G/DL
ALP SERPL-CCNC: 63 U/L (ref 39–117)
ALT SERPL-CCNC: 18 U/L (ref 1–33)
AST SERPL-CCNC: 23 U/L (ref 1–32)
BASOPHILS # BLD AUTO: 0.04 10*3/MM3 (ref 0–0.2)
BASOPHILS NFR BLD AUTO: 0.5 % (ref 0–1.5)
BILIRUB SERPL-MCNC: 0.5 MG/DL (ref 0.2–1.2)
BUN SERPL-MCNC: 15 MG/DL (ref 8–23)
BUN/CREAT SERPL: 19.2 (ref 7–25)
CALCIUM SERPL-MCNC: 9.5 MG/DL (ref 8.6–10.5)
CHLORIDE SERPL-SCNC: 101 MMOL/L (ref 98–107)
CHOLEST SERPL-MCNC: 209 MG/DL (ref 0–200)
CO2 SERPL-SCNC: 28.6 MMOL/L (ref 22–29)
CREAT SERPL-MCNC: 0.78 MG/DL (ref 0.57–1)
EOSINOPHIL # BLD AUTO: 0.14 10*3/MM3 (ref 0–0.4)
EOSINOPHIL NFR BLD AUTO: 1.8 % (ref 0.3–6.2)
ERYTHROCYTE [DISTWIDTH] IN BLOOD BY AUTOMATED COUNT: 13 % (ref 12.3–15.4)
GLOBULIN SER CALC-MCNC: 2 GM/DL
GLUCOSE SERPL-MCNC: 96 MG/DL (ref 65–99)
HCT VFR BLD AUTO: 41 % (ref 34–46.6)
HDLC SERPL-MCNC: 44 MG/DL (ref 40–60)
HGB BLD-MCNC: 13.5 G/DL (ref 12–15.9)
IMM GRANULOCYTES # BLD AUTO: 0.01 10*3/MM3 (ref 0–0.05)
IMM GRANULOCYTES NFR BLD AUTO: 0.1 % (ref 0–0.5)
LDLC SERPL CALC-MCNC: 122 MG/DL (ref 0–100)
LYMPHOCYTES # BLD AUTO: 3.4 10*3/MM3 (ref 0.7–3.1)
LYMPHOCYTES NFR BLD AUTO: 43.5 % (ref 19.6–45.3)
MCH RBC QN AUTO: 28.4 PG (ref 26.6–33)
MCHC RBC AUTO-ENTMCNC: 32.9 G/DL (ref 31.5–35.7)
MCV RBC AUTO: 86.1 FL (ref 79–97)
MONOCYTES # BLD AUTO: 0.69 10*3/MM3 (ref 0.1–0.9)
MONOCYTES NFR BLD AUTO: 8.8 % (ref 5–12)
NEUTROPHILS # BLD AUTO: 3.53 10*3/MM3 (ref 1.7–7)
NEUTROPHILS NFR BLD AUTO: 45.3 % (ref 42.7–76)
NRBC BLD AUTO-RTO: 0 /100 WBC (ref 0–0.2)
PLATELET # BLD AUTO: 391 10*3/MM3 (ref 140–450)
POTASSIUM SERPL-SCNC: 4.5 MMOL/L (ref 3.5–5.2)
PROT SERPL-MCNC: 6.7 G/DL (ref 6–8.5)
RBC # BLD AUTO: 4.76 10*6/MM3 (ref 3.77–5.28)
SODIUM SERPL-SCNC: 142 MMOL/L (ref 136–145)
TRIGL SERPL-MCNC: 215 MG/DL (ref 0–150)
VLDLC SERPL CALC-MCNC: 43 MG/DL
WBC # BLD AUTO: 7.81 10*3/MM3 (ref 3.4–10.8)

## 2019-12-16 ENCOUNTER — OFFICE VISIT (OUTPATIENT)
Dept: INTERNAL MEDICINE | Facility: CLINIC | Age: 69
End: 2019-12-16

## 2019-12-16 VITALS
SYSTOLIC BLOOD PRESSURE: 160 MMHG | BODY MASS INDEX: 26.58 KG/M2 | DIASTOLIC BLOOD PRESSURE: 80 MMHG | OXYGEN SATURATION: 98 % | WEIGHT: 150 LBS | TEMPERATURE: 97.7 F | HEART RATE: 76 BPM | HEIGHT: 63 IN | RESPIRATION RATE: 16 BRPM

## 2019-12-16 DIAGNOSIS — I10 BENIGN ESSENTIAL HYPERTENSION: ICD-10-CM

## 2019-12-16 DIAGNOSIS — Z00.00 ROUTINE GENERAL MEDICAL EXAMINATION AT A HEALTH CARE FACILITY: Primary | ICD-10-CM

## 2019-12-16 DIAGNOSIS — E78.2 MIXED HYPERLIPIDEMIA: ICD-10-CM

## 2019-12-16 DIAGNOSIS — Z23 NEED FOR VACCINATION: ICD-10-CM

## 2019-12-16 PROCEDURE — G0439 PPPS, SUBSEQ VISIT: HCPCS | Performed by: INTERNAL MEDICINE

## 2019-12-16 PROCEDURE — G0009 ADMIN PNEUMOCOCCAL VACCINE: HCPCS | Performed by: INTERNAL MEDICINE

## 2019-12-16 PROCEDURE — 90732 PPSV23 VACC 2 YRS+ SUBQ/IM: CPT | Performed by: INTERNAL MEDICINE

## 2019-12-16 RX ORDER — ASPIRIN 81 MG/1
81 TABLET ORAL DAILY
COMMUNITY
End: 2023-03-21

## 2019-12-16 RX ORDER — MELATONIN
2000 DAILY
COMMUNITY

## 2019-12-16 RX ORDER — SENNOSIDES 8.6 MG
650 CAPSULE ORAL EVERY 8 HOURS PRN
COMMUNITY

## 2019-12-16 NOTE — PROGRESS NOTES
The ABCs of the Annual Wellness Visit  Subsequent Medicare Wellness Visit    Chief Complaint   Patient presents with   • Medicare Wellness-subsequent   • Hypertension   • Hyperlipidemia       Subjective   History of Present Illness:  Sahara Hardwick is a 69 y.o. female who presents for a Subsequent Medicare Wellness Visit.  Patient is also here to follow-up on her blood pressure which is noted to be elevated in the clinic, she has brought in her home blood pressure readings which are within normal range, she is also to follow-up on her cholesterol and had lab work done, she needs a Pneumovax  Answers for HPI/ROS submitted by the patient on 12/9/2019   Hypertension  Chronicity: chronic  Onset: more than 1 year ago  Progression since onset: gradually improving  Condition status: controlled  anxiety: No  blurred vision: No  malaise/fatigue: No  orthopnea: No  peripheral edema: No  PND: No  sweats: No  Agents associated with hypertension: no associated agents  CAD risks: dyslipidemia, family history, post-menopausal state  Compliance problems: no compliance problems      HEALTH RISK ASSESSMENT    Recent Hospitalizations:  No hospitalization(s) within the last year.    Current Medical Providers:  Patient Care Team:  hSona Horn MD as PCP - General (Internal Medicine)  Shona Horn MD as PCP - Claims Attributed    Smoking Status:  Social History     Tobacco Use   Smoking Status Never Smoker   Smokeless Tobacco Never Used       Alcohol Consumption:  Social History     Substance and Sexual Activity   Alcohol Use No       Depression Screen:   PHQ-2/PHQ-9 Depression Screening 12/16/2019   Little interest or pleasure in doing things 0   Feeling down, depressed, or hopeless 0   Total Score 0       Fall Risk Screen:  NELLAADI Fall Risk Assessment was completed, and patient is at MODERATE risk for falls. Assessment completed on:12/16/2019    Health Habits and Functional and Cognitive Screening:  Functional & Cognitive Status  12/16/2019   Do you have difficulty preparing food and eating? No   Do you have difficulty bathing yourself, getting dressed or grooming yourself? No   Do you have difficulty using the toilet? No   Do you have difficulty moving around from place to place? No   Do you have trouble with steps or getting out of a bed or a chair? No   Current Diet Well Balanced Diet   Dental Exam Up to date   Eye Exam Up to date   Exercise (times per week) 6 times per week   Current Exercise Activities Include Housecleaning   Do you need help using the phone?  No   Are you deaf or do you have serious difficulty hearing?  No   Do you need help with transportation? No   Do you need help shopping? No   Do you need help preparing meals?  No   Do you need help with housework?  No   Do you need help with laundry? No   Do you need help taking your medications? No   Do you need help managing money? No   Do you ever drive or ride in a car without wearing a seat belt? No   Have you felt unusual stress, anger or loneliness in the last month? No   Who do you live with? Spouse   If you need help, do you have trouble finding someone available to you? No   Have you been bothered in the last four weeks by sexual problems? No   Do you have difficulty concentrating, remembering or making decisions? No         Does the patient have evidence of cognitive impairment? No    Asprin use counseling:Taking ASA appropriately as indicated    Age-appropriate Screening Schedule:  Refer to the list below for future screening recommendations based on patient's age, sex and/or medical conditions. Orders for these recommended tests are listed in the plan section. The patient has been provided with a written plan.    Health Maintenance   Topic Date Due   • ZOSTER VACCINE (2 of 3) 03/20/2013   • PNEUMOCOCCAL VACCINES (65+ LOW/MEDIUM RISK) (2 of 2 - PPSV23) 07/23/2019   • MAMMOGRAM  10/26/2020   • DXA SCAN  10/26/2020   • LIPID PANEL  12/05/2020   • TDAP/TD VACCINES (2 -  Td) 01/23/2025   • COLONOSCOPY  05/16/2029   • INFLUENZA VACCINE  Completed          The following portions of the patient's history were reviewed and updated as appropriate: allergies, current medications, past family history, past medical history, past social history, past surgical history and problem list.    Outpatient Medications Prior to Visit   Medication Sig Dispense Refill   • acetaminophen (TYLENOL) 650 MG 8 hr tablet Take 650 mg by mouth Every 8 (Eight) Hours As Needed for Mild Pain .     • aspirin 81 MG EC tablet Take 81 mg by mouth Daily.     • cholecalciferol (VITAMIN D3) 25 MCG (1000 UT) tablet Take 2,000 Units by mouth Daily.     • lisinopril-hydrochlorothiazide (PRINZIDE,ZESTORETIC) 20-12.5 MG per tablet Take 0.5 tablets by mouth Daily. 90 tablet 2   • Multiple Vitamins-Minerals (MULTIVITAMIN ADULT PO) Take 1 tablet by mouth Daily.     • omeprazole (priLOSEC) 20 MG capsule Take 1 capsule by mouth Daily.     • simvastatin (ZOCOR) 20 MG tablet TAKE ONE TABLET BY MOUTH ONCE NIGHTLY 90 tablet 2   • tacrolimus (PROGRAF) 1 MG capsule Apply 1 mg to the mouth or throat Take As Directed. Empty contents of 1 capsule into 1 L distilled water; Once dissolved, swish and spit up to 4 times a day.     • cimetidine (TAGAMET) 200 MG tablet Take 200 mg by mouth Daily As Needed.     • Omega-3 Fatty Acids (FISH OIL) 1200 MG capsule delayed-release Take 1 capsule by mouth Daily.       No facility-administered medications prior to visit.        Patient Active Problem List   Diagnosis   • Primary osteoarthritis of right knee   • Benign essential hypertension   • Mixed hyperlipidemia       Advanced Care Planning:  Patient has an advance directive - a copy has not been provided. Have asked the patient to send this to us to add to record    Review of Systems   Constitutional: Negative for appetite change, fatigue and fever.   HENT: Negative for congestion, ear discharge, ear pain, sinus pressure and sore throat.    Eyes:  "Negative for pain and discharge.   Respiratory: Negative for cough, chest tightness, shortness of breath and wheezing.    Cardiovascular: Negative for chest pain, palpitations and leg swelling.   Gastrointestinal: Negative for abdominal pain, blood in stool, constipation, diarrhea and nausea.   Endocrine: Negative for cold intolerance and heat intolerance.   Genitourinary: Negative for dysuria, flank pain and frequency.   Musculoskeletal: Negative for back pain and joint swelling.   Skin: Negative for color change.   Allergic/Immunologic: Negative for environmental allergies and food allergies.   Neurological: Negative for dizziness, weakness, numbness and headaches.   Hematological: Negative for adenopathy. Does not bruise/bleed easily.   Psychiatric/Behavioral: Negative for behavioral problems and dysphoric mood. The patient is not nervous/anxious.        Compared to one year ago, the patient feels her physical health is the same.  Compared to one year ago, the patient feels her mental health is the same.    Reviewed chart for potential of high risk medication in the elderly: yes  Reviewed chart for potential of harmful drug interactions in the elderly:yes    Objective         Vitals:    12/16/19 1554   BP: 160/80   Pulse: 76   Resp: 16   Temp: 97.7 °F (36.5 °C)   SpO2: 98%   Weight: 68 kg (150 lb)   Height: 160 cm (62.99\")   PainSc: 0-No pain       Body mass index is 26.58 kg/m².  Discussed the patient's BMI with her. The BMI is in the acceptable range.    Physical Exam   Constitutional: She is oriented to person, place, and time. She appears well-developed and well-nourished. No distress.   HENT:   Head: Normocephalic and atraumatic.   Right Ear: External ear normal.   Left Ear: External ear normal.   Nose: Nose normal.   Mouth/Throat: Oropharynx is clear and moist.   Eyes: Pupils are equal, round, and reactive to light. Conjunctivae and EOM are normal.   Neck: Neck supple. No thyromegaly present. "   Cardiovascular: Normal rate, regular rhythm and normal heart sounds.   Pulmonary/Chest: Effort normal and breath sounds normal. No respiratory distress.   Abdominal: Soft. Bowel sounds are normal. She exhibits no distension. There is no tenderness. There is no rebound.   Musculoskeletal: Normal range of motion. She exhibits no edema.   Lymphadenopathy:     She has no cervical adenopathy.   Neurological: She is alert and oriented to person, place, and time.   No gross motor or sensory deficits   Skin: Skin is warm. She is not diaphoretic.   Psychiatric: She has a normal mood and affect.   Nursing note and vitals reviewed.      Lab Results   Component Value Date    GLU 96 12/05/2019    CHLPL 209 (H) 12/05/2019    TRIG 215 (H) 12/05/2019    HDL 44 12/05/2019     (H) 12/05/2019    VLDL 43 12/05/2019        Assessment/Plan   Medicare Risks and Personalized Health Plan  CMS Preventative Services Quick Reference  Advance Directive Discussion  Breast Cancer/Mammogram Screening  Cardiovascular risk  Immunizations Discussed/Encouraged (specific immunizations; Pneumococcal 23 )    The above risks/problems have been discussed with the patient.  Pertinent information has been shared with the patient in the After Visit Summary.  Follow up plans and orders are seen below in the Assessment/Plan Section.    Diagnoses and all orders for this visit:    1. Routine general medical examination at a health care facility (Primary)    2. Benign essential hypertension    3. Mixed hyperlipidemia    4. Need for vaccination  -     Pneumococcal Polysaccharide Vaccine 23-Valent (PPSV23) Greater Than or Equal To 3yo Subcutaneous / IM      Plan:  1.physical: Physical exam conducted today, reviewed fasting lab work,   Impression: healthy adult Patient. Currently, patient eats a healthy diet. Screening lab work includes glucose, lipid profile and complete blood count . The risks and benefits of immunizations were discussed and immunizations  are up to date. Advice and education were given regarding nutrition and aerobic exercise. Patient discussion: discussed with the patient.  Annual Wellness Visit  with preventive exam as well as age and risk appropriate counseling completed.   Advance Directive Planning: paperwork and instructions were given to the patient.   Patient Discussion: plan discussed with the patient, follow-up visit needed in one year. Medication Review and medication list updated  2.  Benign essential hypertension: Will continue current medication, low-sodium diet advised  3.mixed hyperlipidemia:  reviewed  fasting CMP and lipid panel.  Diet and exercise counseled,  Will continue current medications   4.  Need for Pneumovax: Given today    Follow Up:  Return in 1 year (on 12/16/2020) for Medicare Wellness.     An After Visit Summary and PPPS were given to the patient.

## 2019-12-16 NOTE — PATIENT INSTRUCTIONS
Exercising to Stay Healthy  To become healthy and stay healthy, it is recommended that you do moderate-intensity and vigorous-intensity exercise. You can tell that you are exercising at a moderate intensity if your heart starts beating faster and you start breathing faster but can still hold a conversation. You can tell that you are exercising at a vigorous intensity if you are breathing much harder and faster and cannot hold a conversation while exercising.  Exercising regularly is important. It has many health benefits, such as:  · Improving overall fitness, flexibility, and endurance.  · Increasing bone density.  · Helping with weight control.  · Decreasing body fat.  · Increasing muscle strength.  · Reducing stress and tension.  · Improving overall health.  How often should I exercise?  Choose an activity that you enjoy, and set realistic goals. Your health care provider can help you make an activity plan that works for you.  Exercise regularly as told by your health care provider. This may include:  · Doing strength training two times a week, such as:  ? Lifting weights.  ? Using resistance bands.  ? Push-ups.  ? Sit-ups.  ? Yoga.  · Doing a certain intensity of exercise for a given amount of time. Choose from these options:  ? A total of 150 minutes of moderate-intensity exercise every week.  ? A total of 75 minutes of vigorous-intensity exercise every week.  ? A mix of moderate-intensity and vigorous-intensity exercise every week.  Children, pregnant women, people who have not exercised regularly, people who are overweight, and older adults may need to talk with a health care provider about what activities are safe to do. If you have a medical condition, be sure to talk with your health care provider before you start a new exercise program.  What are some exercise ideas?  Moderate-intensity exercise ideas include:  · Walking 1 mile (1.6 km) in about 15  minutes.  · Biking.  · Hiking.  · Golfing.  · Dancing.  · Water aerobics.  Vigorous-intensity exercise ideas include:  · Walking 4.5 miles (7.2 km) or more in about 1 hour.  · Jogging or running 5 miles (8 km) in about 1 hour.  · Biking 10 miles (16.1 km) or more in about 1 hour.  · Lap swimming.  · Roller-skating or in-line skating.  · Cross-country skiing.  · Vigorous competitive sports, such as football, basketball, and soccer.  · Jumping rope.  · Aerobic dancing.  What are some everyday activities that can help me to get exercise?  · Yard work, such as:  ? Pushing a .  ? Raking and bagging leaves.  · Washing your car.  · Pushing a stroller.  · Shoveling snow.  · Gardening.  · Washing windows or floors.  How can I be more active in my day-to-day activities?  · Use stairs instead of an elevator.  · Take a walk during your lunch break.  · If you drive, park your car farther away from your work or school.  · If you take public transportation, get off one stop early and walk the rest of the way.  · Stand up or walk around during all of your indoor phone calls.  · Get up, stretch, and walk around every 30 minutes throughout the day.  · Enjoy exercise with a friend. Support to continue exercising will help you keep a regular routine of activity.  What guidelines can I follow while exercising?  · Before you start a new exercise program, talk with your health care provider.  · Do not exercise so much that you hurt yourself, feel dizzy, or get very short of breath.  · Wear comfortable clothes and wear shoes with good support.  · Drink plenty of water while you exercise to prevent dehydration or heat stroke.  · Work out until your breathing and your heartbeat get faster.  Where to find more information  · [place] Department of Health and Human Services: www.hhs.gov  · Centers for Disease Control and Prevention (CDC): www.cdc.gov  Summary  · Exercising regularly is important. It will improve your overall fitness,  flexibility, and endurance.  · Regular exercise also will improve your overall health. It can help you control your weight, reduce stress, and improve your bone density.  · Do not exercise so much that you hurt yourself, feel dizzy, or get very short of breath.  · Before you start a new exercise program, talk with your health care provider.  This information is not intended to replace advice given to you by your health care provider. Make sure you discuss any questions you have with your health care provider.  Document Released: 2012 Document Revised: 2018 Document Reviewed: 2018  [employer] Interactive Patient Education © 2019 [employer]    Medicare Wellness  Personal Prevention Plan of Service     Date of Office Visit:  2019  Encounter Provider:  Shona Horn MD  Place of Service:  [facility]  Patient Name: Sahara Hardwick  :  1950    As part of the Medicare Wellness portion of your visit today, we are providing you with this personalized preventive plan of services (PPPS). This plan is based upon recommendations of the [place] Preventive Services Task Force (USPSTF) and the Advisory Committee on Immunization Practices (ACIP).    This lists the preventive care services that should be considered, and provides dates of when you are due. Items listed as completed are up-to-date and do not require any further intervention.    Health Maintenance   Topic Date Due   • ZOSTER VACCINE (2 of 3) 2013   • HEPATITIS C SCREENING  2017   • MEDICARE ANNUAL WELLNESS  2018   • PNEUMOCOCCAL VACCINES (65+ LOW/MEDIUM RISK) (2 of 2 - PPSV23) 2019   • MAMMOGRAM  10/26/2020   • DXA SCAN  10/26/2020   • LIPID PANEL  2020   • TDAP/TD VACCINES (2 - Td) 2025   • COLONOSCOPY  2029   • INFLUENZA VACCINE  Completed       No orders of the defined types were placed in this encounter.      Return in 1 year (on 2020) for Medicare  Wellness.

## 2020-07-22 ENCOUNTER — OFFICE VISIT (OUTPATIENT)
Dept: INTERNAL MEDICINE | Facility: CLINIC | Age: 70
End: 2020-07-22

## 2020-07-22 VITALS
OXYGEN SATURATION: 98 % | HEIGHT: 63 IN | BODY MASS INDEX: 24.98 KG/M2 | HEART RATE: 61 BPM | SYSTOLIC BLOOD PRESSURE: 146 MMHG | RESPIRATION RATE: 16 BRPM | DIASTOLIC BLOOD PRESSURE: 77 MMHG | TEMPERATURE: 97.1 F | WEIGHT: 141 LBS

## 2020-07-22 DIAGNOSIS — R53.81 MALAISE AND FATIGUE: ICD-10-CM

## 2020-07-22 DIAGNOSIS — E78.2 MIXED HYPERLIPIDEMIA: ICD-10-CM

## 2020-07-22 DIAGNOSIS — R53.83 MALAISE AND FATIGUE: ICD-10-CM

## 2020-07-22 DIAGNOSIS — I10 BENIGN ESSENTIAL HYPERTENSION: Primary | ICD-10-CM

## 2020-07-22 PROCEDURE — 99214 OFFICE O/P EST MOD 30 MIN: CPT | Performed by: INTERNAL MEDICINE

## 2020-07-22 RX ORDER — LISINOPRIL AND HYDROCHLOROTHIAZIDE 20; 12.5 MG/1; MG/1
0.5 TABLET ORAL DAILY
Qty: 90 TABLET | Refills: 2 | Status: SHIPPED | OUTPATIENT
Start: 2020-07-22 | End: 2021-03-24

## 2020-07-22 RX ORDER — SIMVASTATIN 20 MG
20 TABLET ORAL NIGHTLY
Qty: 90 TABLET | Refills: 2 | Status: SHIPPED | OUTPATIENT
Start: 2020-07-22 | End: 2021-03-24

## 2020-07-22 NOTE — PROGRESS NOTES
Subjective   Sahara Hardwick is a 69 y.o. female.     Chief Complaint   Patient presents with   • Hypertension   • Hyperlipidemia   • Fatigue       History of Present Illness   HPI: Patient is here to follow up on the blood pressure  The patient is taking the blood pressure medications as prescribed and has had no side effects. The patient is also here to follow up on the cholesterol and is trying to follow a diet. The patient is  also here to follow up on celiac disease and had egd ,colonoscopy 2019 , she is due mammogram , she is also complains of fatigue and due to get lab work done .  The patient also needs refills on medications .   Hyperlipidemia   Pertinent negatives include no chest pain or shortness of breath.   Hypertension   Pertinent negatives include no chest pain, palpitations or shortness of breath.  Answers for HPI/ROS submitted by the patient on 7/19/2020   What is the primary reason for your visit?: Other  Please describe your symptoms.: check-up for high blood pressure/ blood work done/ prescription renewals  Have you had these symptoms before?: Yes  How long have you been having these symptoms?: Greater than 2 weeks  Please list any medications you are currently taking for this condition.: lisinopril    The following portions of the patient's history were reviewed and updated as appropriate: allergies, current medications, past family history, past medical history, past social history, past surgical history and problem list.    Review of Systems   Constitutional: Positive for fatigue. Negative for appetite change and fever.   HENT: Negative for congestion, ear discharge, ear pain, sinus pressure and sore throat.    Eyes: Negative for pain and discharge.   Respiratory: Negative for cough, chest tightness, shortness of breath and wheezing.    Cardiovascular: Negative for chest pain, palpitations and leg swelling.   Gastrointestinal: Negative for abdominal pain, blood in stool, constipation, diarrhea  "and nausea.   Endocrine: Negative for cold intolerance and heat intolerance.   Genitourinary: Negative for dysuria, flank pain and frequency.   Musculoskeletal: Negative for back pain and joint swelling.   Skin: Negative for color change.   Allergic/Immunologic: Negative for environmental allergies and food allergies.   Neurological: Negative for dizziness, weakness, numbness and headaches.   Hematological: Negative for adenopathy. Does not bruise/bleed easily.   Psychiatric/Behavioral: Negative for behavioral problems and dysphoric mood. The patient is not nervous/anxious.          Current Outpatient Medications:   •  acetaminophen (TYLENOL) 650 MG 8 hr tablet, Take 650 mg by mouth Every 8 (Eight) Hours As Needed for Mild Pain ., Disp: , Rfl:   •  aspirin 81 MG EC tablet, Take 81 mg by mouth Daily., Disp: , Rfl:   •  cholecalciferol (VITAMIN D3) 25 MCG (1000 UT) tablet, Take 2,000 Units by mouth Daily., Disp: , Rfl:   •  lisinopril-hydrochlorothiazide (PRINZIDE,ZESTORETIC) 20-12.5 MG per tablet, Take 0.5 tablets by mouth Daily., Disp: 90 tablet, Rfl: 2  •  Multiple Vitamins-Minerals (MULTIVITAMIN ADULT PO), Take 1 tablet by mouth Daily., Disp: , Rfl:   •  omeprazole (priLOSEC) 20 MG capsule, Take 1 capsule by mouth Daily., Disp: , Rfl:   •  simvastatin (ZOCOR) 20 MG tablet, Take 1 tablet by mouth Every Night., Disp: 90 tablet, Rfl: 2  •  tacrolimus (PROGRAF) 1 MG capsule, Apply 1 mg to the mouth or throat Take As Directed. Empty contents of 1 capsule into 1 L distilled water; Once dissolved, swish and spit up to 4 times a day., Disp: , Rfl:     Objective     Blood pressure 146/77, pulse 61, temperature 97.1 °F (36.2 °C), resp. rate 16, height 160 cm (62.99\"), weight 64 kg (141 lb), SpO2 98 %, not currently breastfeeding.    Physical Exam   Constitutional: She is oriented to person, place, and time. She appears well-developed and well-nourished. No distress.   HENT:   Head: Normocephalic and atraumatic.   Right Ear: " External ear normal.   Left Ear: External ear normal.   Nose: Nose normal.   Mouth/Throat: Oropharynx is clear and moist.   Eyes: Pupils are equal, round, and reactive to light. Conjunctivae and EOM are normal.   Neck: Neck supple. No thyromegaly present.   Cardiovascular: Normal rate, regular rhythm and normal heart sounds.   Pulmonary/Chest: Effort normal and breath sounds normal. No respiratory distress.   Abdominal: Soft. Bowel sounds are normal. She exhibits no distension. There is no tenderness. There is no rebound.   Musculoskeletal: Normal range of motion. She exhibits no edema.   Lymphadenopathy:     She has no cervical adenopathy.   Neurological: She is alert and oriented to person, place, and time.   No gross motor or sensory deficits   Skin: Skin is warm. She is not diaphoretic.   Psychiatric: She has a normal mood and affect.   Nursing note and vitals reviewed.    Patient's Body mass index is 24.98 kg/m². BMI is within normal parameters. No follow-up required..      Results for orders placed or performed in visit on 07/22/20   Comprehensive Metabolic Panel   Result Value Ref Range    Glucose 93 65 - 99 mg/dL    BUN 15 8 - 23 mg/dL    Creatinine 0.67 0.57 - 1.00 mg/dL    eGFR Non African Am 88 >60 mL/min/1.73    eGFR African Am 106 >60 mL/min/1.73    BUN/Creatinine Ratio 22.4 7.0 - 25.0    Sodium 143 136 - 145 mmol/L    Potassium 5.0 3.5 - 5.2 mmol/L    Chloride 103 98 - 107 mmol/L    Total CO2 29.0 22.0 - 29.0 mmol/L    Calcium 9.8 8.6 - 10.5 mg/dL    Total Protein 6.5 6.0 - 8.5 g/dL    Albumin 4.50 3.50 - 5.20 g/dL    Globulin 2.0 gm/dL    A/G Ratio 2.3 g/dL    Total Bilirubin 0.4 0.0 - 1.2 mg/dL    Alkaline Phosphatase 53 39 - 117 U/L    AST (SGOT) 17 1 - 32 U/L    ALT (SGPT) 14 1 - 33 U/L   Lipid Panel   Result Value Ref Range    Total Cholesterol 186 0 - 200 mg/dL    Triglycerides 211 (H) 0 - 150 mg/dL    HDL Cholesterol 46 40 - 60 mg/dL    VLDL Cholesterol 42.2 mg/dL    LDL Cholesterol  98 0 - 100  mg/dL   TSH   Result Value Ref Range    TSH 2.500 0.270 - 4.200 uIU/mL   CBC & Differential   Result Value Ref Range    WBC 7.26 3.40 - 10.80 10*3/mm3    RBC 4.72 3.77 - 5.28 10*6/mm3    Hemoglobin 13.9 12.0 - 15.9 g/dL    Hematocrit 41.5 34.0 - 46.6 %    MCV 87.9 79.0 - 97.0 fL    MCH 29.4 26.6 - 33.0 pg    MCHC 33.5 31.5 - 35.7 g/dL    RDW 12.7 12.3 - 15.4 %    Platelets 352 140 - 450 10*3/mm3    Neutrophil Rel % 52.3 42.7 - 76.0 %    Lymphocyte Rel % 37.5 19.6 - 45.3 %    Monocyte Rel % 8.7 5.0 - 12.0 %    Eosinophil Rel % 1.0 0.3 - 6.2 %    Basophil Rel % 0.4 0.0 - 1.5 %    Neutrophils Absolute 3.80 1.70 - 7.00 10*3/mm3    Lymphocytes Absolute 2.72 0.70 - 3.10 10*3/mm3    Monocytes Absolute 0.63 0.10 - 0.90 10*3/mm3    Eosinophils Absolute 0.07 0.00 - 0.40 10*3/mm3    Basophils Absolute 0.03 0.00 - 0.20 10*3/mm3    Immature Granulocyte Rel % 0.1 0.0 - 0.5 %    Immature Grans Absolute 0.01 0.00 - 0.05 10*3/mm3    nRBC 0.1 0.0 - 0.2 /100 WBC         Assessment/Plan   Sahara was seen today for hypertension, hyperlipidemia and fatigue.    Diagnoses and all orders for this visit:    Benign essential hypertension    Mixed hyperlipidemia  -     Cancel: CBC & Differential  -     Cancel: Comprehensive Metabolic Panel  -     Cancel: Lipid Panel  -     CBC & Differential  -     Comprehensive Metabolic Panel  -     Lipid Panel    Malaise and fatigue  -     TSH    Other orders  -     lisinopril-hydrochlorothiazide (PRINZIDE,ZESTORETIC) 20-12.5 MG per tablet; Take 0.5 tablets by mouth Daily.  -     simvastatin (ZOCOR) 20 MG tablet; Take 1 tablet by mouth Every Night.      Plan:  1.  Benign essential hypertension: Will continue current medication, low-sodium diet advised, Counseled to regularly check BP at home with goal averaging <130/80.   2.mixed hyperlipidemia: will obtain   fasting CMP and lipid panel.  Diet and exercise counseled,  Will continue current medications  3.  Fatigue: We will obtain labs         Shona Horn,  MD

## 2020-07-23 LAB
ALBUMIN SERPL-MCNC: 4.5 G/DL (ref 3.5–5.2)
ALBUMIN/GLOB SERPL: 2.3 G/DL
ALP SERPL-CCNC: 53 U/L (ref 39–117)
ALT SERPL-CCNC: 14 U/L (ref 1–33)
AST SERPL-CCNC: 17 U/L (ref 1–32)
BASOPHILS # BLD AUTO: 0.03 10*3/MM3 (ref 0–0.2)
BASOPHILS NFR BLD AUTO: 0.4 % (ref 0–1.5)
BILIRUB SERPL-MCNC: 0.4 MG/DL (ref 0–1.2)
BUN SERPL-MCNC: 15 MG/DL (ref 8–23)
BUN/CREAT SERPL: 22.4 (ref 7–25)
CALCIUM SERPL-MCNC: 9.8 MG/DL (ref 8.6–10.5)
CHLORIDE SERPL-SCNC: 103 MMOL/L (ref 98–107)
CHOLEST SERPL-MCNC: 186 MG/DL (ref 0–200)
CO2 SERPL-SCNC: 29 MMOL/L (ref 22–29)
CREAT SERPL-MCNC: 0.67 MG/DL (ref 0.57–1)
EOSINOPHIL # BLD AUTO: 0.07 10*3/MM3 (ref 0–0.4)
EOSINOPHIL NFR BLD AUTO: 1 % (ref 0.3–6.2)
ERYTHROCYTE [DISTWIDTH] IN BLOOD BY AUTOMATED COUNT: 12.7 % (ref 12.3–15.4)
GLOBULIN SER CALC-MCNC: 2 GM/DL
GLUCOSE SERPL-MCNC: 93 MG/DL (ref 65–99)
HCT VFR BLD AUTO: 41.5 % (ref 34–46.6)
HDLC SERPL-MCNC: 46 MG/DL (ref 40–60)
HGB BLD-MCNC: 13.9 G/DL (ref 12–15.9)
IMM GRANULOCYTES # BLD AUTO: 0.01 10*3/MM3 (ref 0–0.05)
IMM GRANULOCYTES NFR BLD AUTO: 0.1 % (ref 0–0.5)
LDLC SERPL CALC-MCNC: 98 MG/DL (ref 0–100)
LYMPHOCYTES # BLD AUTO: 2.72 10*3/MM3 (ref 0.7–3.1)
LYMPHOCYTES NFR BLD AUTO: 37.5 % (ref 19.6–45.3)
MCH RBC QN AUTO: 29.4 PG (ref 26.6–33)
MCHC RBC AUTO-ENTMCNC: 33.5 G/DL (ref 31.5–35.7)
MCV RBC AUTO: 87.9 FL (ref 79–97)
MONOCYTES # BLD AUTO: 0.63 10*3/MM3 (ref 0.1–0.9)
MONOCYTES NFR BLD AUTO: 8.7 % (ref 5–12)
NEUTROPHILS # BLD AUTO: 3.8 10*3/MM3 (ref 1.7–7)
NEUTROPHILS NFR BLD AUTO: 52.3 % (ref 42.7–76)
NRBC BLD AUTO-RTO: 0.1 /100 WBC (ref 0–0.2)
PLATELET # BLD AUTO: 352 10*3/MM3 (ref 140–450)
POTASSIUM SERPL-SCNC: 5 MMOL/L (ref 3.5–5.2)
PROT SERPL-MCNC: 6.5 G/DL (ref 6–8.5)
RBC # BLD AUTO: 4.72 10*6/MM3 (ref 3.77–5.28)
SODIUM SERPL-SCNC: 143 MMOL/L (ref 136–145)
TRIGL SERPL-MCNC: 211 MG/DL (ref 0–150)
TSH SERPL DL<=0.005 MIU/L-ACNC: 2.5 UIU/ML (ref 0.27–4.2)
VLDLC SERPL CALC-MCNC: 42.2 MG/DL
WBC # BLD AUTO: 7.26 10*3/MM3 (ref 3.4–10.8)

## 2021-01-14 ENCOUNTER — OFFICE VISIT (OUTPATIENT)
Dept: INTERNAL MEDICINE | Facility: CLINIC | Age: 71
End: 2021-01-14

## 2021-01-14 VITALS
SYSTOLIC BLOOD PRESSURE: 123 MMHG | DIASTOLIC BLOOD PRESSURE: 70 MMHG | WEIGHT: 137 LBS | BODY MASS INDEX: 24.27 KG/M2 | HEART RATE: 73 BPM | HEIGHT: 63 IN

## 2021-01-14 DIAGNOSIS — I10 BENIGN ESSENTIAL HYPERTENSION: ICD-10-CM

## 2021-01-14 DIAGNOSIS — Z00.00 ROUTINE GENERAL MEDICAL EXAMINATION AT A HEALTH CARE FACILITY: Primary | ICD-10-CM

## 2021-01-14 DIAGNOSIS — E78.2 MIXED HYPERLIPIDEMIA: ICD-10-CM

## 2021-01-14 PROCEDURE — G0439 PPPS, SUBSEQ VISIT: HCPCS | Performed by: INTERNAL MEDICINE

## 2021-01-14 NOTE — PROGRESS NOTES
The ABCs of the Annual Wellness Visit  Subsequent Medicare Wellness Visit    Chief Complaint   Patient presents with   • Medicare Wellness-subsequent   • Hypertension   • Hyperlipidemia       Subjective   History of Present Illness:  Sahara Hardwick is a 70 y.o. female who presents for a Subsequent Medicare Wellness Visit. Patient is following up on the blood pressure  The patient is taking the blood pressure medications as prescribed and has had no side effects. The patient is also following up on the cholesterol and is trying to follow a diet. The patient   is  due to get lab work done .   Video visit with patient today  Hyperlipidemia   Pertinent negatives include no chest pain or shortness of breath.   Hypertension   Pertinent negatives include no chest pain, palpitations or shortness of breath.      HEALTH RISK ASSESSMENT    Recent Hospitalizations:  No hospitalization(s) within the last year.    Current Medical Providers:  Patient Care Team:  Shona Horn MD as PCP - General (Internal Medicine)    Smoking Status:  Social History     Tobacco Use   Smoking Status Never Smoker   Smokeless Tobacco Never Used       Alcohol Consumption:  Social History     Substance and Sexual Activity   Alcohol Use No       Depression Screen:   PHQ-2/PHQ-9 Depression Screening 1/14/2021   Little interest or pleasure in doing things 0   Feeling down, depressed, or hopeless 0   Total Score 0       Fall Risk Screen:  STEADI Fall Risk Assessment was completed, and patient is at MODERATE risk for falls. Assessment completed on:1/14/2021    Health Habits and Functional and Cognitive Screening:  Functional & Cognitive Status 1/14/2021   Do you have difficulty preparing food and eating? No   Do you have difficulty bathing yourself, getting dressed or grooming yourself? No   Do you have difficulty using the toilet? No   Do you have difficulty moving around from place to place? No   Do you have trouble with steps or getting out of a bed or a  chair? No   Current Diet Well Balanced Diet   Dental Exam Up to date   Eye Exam Up to date   Exercise (times per week) 6 times per week   Current Exercise Activities Include Walking   Do you need help using the phone?  No   Are you deaf or do you have serious difficulty hearing?  No   Do you need help with transportation? No   Do you need help shopping? No   Do you need help preparing meals?  No   Do you need help with housework?  No   Do you need help with laundry? No   Do you need help taking your medications? No   Do you need help managing money? No   Do you ever drive or ride in a car without wearing a seat belt? No   Have you felt unusual stress, anger or loneliness in the last month? No   Who do you live with? Spouse   If you need help, do you have trouble finding someone available to you? No   Have you been bothered in the last four weeks by sexual problems? No   Do you have difficulty concentrating, remembering or making decisions? No         Does the patient have evidence of cognitive impairment? No    Asprin use counseling:Taking ASA appropriately as indicated    Age-appropriate Screening Schedule:  Refer to the list below for future screening recommendations based on patient's age, sex and/or medical conditions. Orders for these recommended tests are listed in the plan section. The patient has been provided with a written plan.    Health Maintenance   Topic Date Due   • DXA SCAN  10/26/2020   • LIPID PANEL  07/22/2021   • MAMMOGRAM  11/16/2022   • TDAP/TD VACCINES (3 - Td) 01/23/2025   • COLONOSCOPY  05/16/2029   • INFLUENZA VACCINE  Completed   • ZOSTER VACCINE  Completed          The following portions of the patient's history were reviewed and updated as appropriate: allergies, current medications, past family history, past medical history, past social history, past surgical history and problem list.    During today's visit, I reviewed the documented allergies, medications, chief complaint, and  pertinent vitals.  I have confirmed with the patient that there have been no changes since this information was discussed with my clinical team member.      Outpatient Medications Prior to Visit   Medication Sig Dispense Refill   • acetaminophen (TYLENOL) 650 MG 8 hr tablet Take 650 mg by mouth Every 8 (Eight) Hours As Needed for Mild Pain .     • aspirin 81 MG EC tablet Take 81 mg by mouth Daily.     • cholecalciferol (VITAMIN D3) 25 MCG (1000 UT) tablet Take 2,000 Units by mouth Daily.     • lisinopril-hydrochlorothiazide (PRINZIDE,ZESTORETIC) 20-12.5 MG per tablet Take 0.5 tablets by mouth Daily. 90 tablet 2   • Multiple Vitamins-Minerals (MULTIVITAMIN ADULT PO) Take 1 tablet by mouth Daily.     • simvastatin (ZOCOR) 20 MG tablet Take 1 tablet by mouth Every Night. 90 tablet 2   • tacrolimus (PROGRAF) 1 MG capsule Apply 1 mg to the mouth or throat Take As Directed. Empty contents of 1 capsule into 1 L distilled water; Once dissolved, swish and spit up to 4 times a day.     • omeprazole (priLOSEC) 20 MG capsule Take 1 capsule by mouth Daily.       No facility-administered medications prior to visit.        Patient Active Problem List   Diagnosis   • Primary osteoarthritis of right knee   • Benign essential hypertension   • Mixed hyperlipidemia       Advanced Care Planning:  ACP discussion was held with the patient during this visit. Patient has an advance directive in EMR which is still valid.     Review of Systems   Constitutional: Negative for appetite change, fatigue and fever.   HENT: Negative for congestion, ear discharge, ear pain, sinus pressure and sore throat.    Eyes: Negative for pain and discharge.   Respiratory: Negative for cough, chest tightness, shortness of breath and wheezing.    Cardiovascular: Negative for chest pain, palpitations and leg swelling.   Gastrointestinal: Negative for abdominal pain, blood in stool, constipation, diarrhea and nausea.   Endocrine: Negative for cold intolerance and  "heat intolerance.   Genitourinary: Negative for dysuria, flank pain and frequency.   Musculoskeletal: Negative for back pain and joint swelling.   Skin: Negative for color change.   Allergic/Immunologic: Negative for environmental allergies and food allergies.   Neurological: Negative for dizziness, weakness, numbness and headaches.   Hematological: Negative for adenopathy. Does not bruise/bleed easily.   Psychiatric/Behavioral: Negative for behavioral problems and dysphoric mood. The patient is not nervous/anxious.        Compared to one year ago, the patient feels her physical health is better.  Compared to one year ago, the patient feels her mental health is the same.    Reviewed chart for potential of high risk medication in the elderly: not applicable  Reviewed chart for potential of harmful drug interactions in the elderly:not applicable    Objective         Vitals:    01/14/21 0903   BP: 123/70   Pulse: 73   Weight: 62.1 kg (137 lb)   Height: 160 cm (62.99\")   PainSc: 0-No pain       Body mass index is 24.27 kg/m².  Discussed the patient's BMI with her. The BMI is in the acceptable range.    Physical Exam  All vitals recorded within this visit are reported by the patient.  General appearance: Normocephalic and nontraumatic  HEENT: External inspection of eyes, ears and nose appears benign, hearing appears intact  Neck: Neck appears supple, trachea in midline, thyroid appears not enlarged  Respiratory: Respiratory effort appears normal  Musculoskeletal: Moving all limbs  Range of motion of visible joints appears within normal  CNS: No gross motor or sensory deficits  No gross cranial nerve deficits  No tremors  Psychiatry: Alert and oriented x3  Memory appears intact  Mood and affect appears normal  Insight appears normal          Assessment/Plan   Medicare Risks and Personalized Health Plan  CMS Preventative Services Quick Reference  Advance Directive Discussion  Breast Cancer/Mammogram " Screening  Cardiovascular risk  Diabetic Lab Screening   Fall Risk  Hearing Problem    The above risks/problems have been discussed with the patient.  Pertinent information has been shared with the patient in the After Visit Summary.  Follow up plans and orders are seen below in the Assessment/Plan Section.    Diagnoses and all orders for this visit:    1. Routine general medical examination at a health care facility (Primary)    2. Benign essential hypertension    3. Mixed hyperlipidemia  -     CBC & Differential  -     Comprehensive Metabolic Panel  -     Lipid Panel    Plan:  1.physical: Physical exam conducted today, patient advised to obtain fasting lab work,   Impression: healthy adult Patient. Currently, patient eats a healthy diet. Screening lab work includes glucose, lipid profile and complete blood count . The risks and benefits of immunizations were discussed and immunizations are up to date. Advice and education were given regarding nutrition and aerobic exercise. Patient discussion: discussed with the patient.  Annual Wellness Visit  with preventive exam as well as age and risk appropriate counseling completed.   Advance Directive Planning: paperwork and instructions were given to the patient.   Patient Discussion: plan discussed with the patient, follow-up visit needed in one year. Medication Review and medication list updated  2.  Benign essential hypertension: Will continue current medication, low-sodium diet advised, Counseled to regularly check BP at home with goal averaging <130/80.   3.mixed hyperlipidemia: will obtain   fasting CMP and lipid panel.  Diet and exercise counseled,  Will continue current medications    This was a  video enabled telemedicine encounter.      Follow Up:  Return in 1 year (on 1/14/2022) for Medicare Wellness.     An After Visit Summary and PPPS were given to the patient.

## 2021-01-14 NOTE — PATIENT INSTRUCTIONS
Medicare Wellness  Personal Prevention Plan of Service     Date of Office Visit:  2021  Encounter Provider:  Shona Horn MD  Place of Service:  [facility]  Patient Name: Sahara Hardwick  :  1950    As part of the Medicare Wellness portion of your visit today, we are providing you with this personalized preventive plan of services (PPPS). This plan is based upon recommendations of the [place] Preventive Services Task Force (USPSTF) and the Advisory Committee on Immunization Practices (ACIP).    This lists the preventive care services that should be considered, and provides dates of when you are due. Items listed as completed are up-to-date and do not require any further intervention.    Health Maintenance   Topic Date Due   • HEPATITIS C SCREENING  2017   • DXA SCAN  10/26/2020   • ANNUAL WELLNESS VISIT  2020   • LIPID PANEL  2021   • MAMMOGRAM  2022   • TDAP/TD VACCINES (3 - Td) 2025   • COLONOSCOPY  2029   • INFLUENZA VACCINE  Completed   • Pneumococcal Vaccine 65+  Completed   • ZOSTER VACCINE  Completed   • MENINGOCOCCAL VACCINE  Aged Out       Orders Placed This Encounter   Procedures   • Comprehensive Metabolic Panel   • Lipid Panel   • CBC & Differential     Order Specific Question:   Manual Differential     Answer:   No       No follow-ups on file.      MyPlate from USDA    MyPlate is an outline of a general healthy diet based on the 2010 Dietary Guidelines for Americans, from the U.S. Department of Agriculture (USDA). It sets guidelines for how much food you should eat from each food group based on your age, sex, and level of physical activity.  What are tips for following MyPlate?  To follow MyPlate recommendations:  · Eat a wide variety of fruits and vegetables, grains, and protein foods.  · Serve smaller portions and eat less food throughout the day.  · Limit portion sizes to avoid overeating.  · Enjoy your  food.  · Get at least 150 minutes of exercise every week. This is about 30 minutes each day, 5 or more days per week.  It can be difficult to have every meal look like MyPlate. Think about MyPlate as eating guidelines for an entire day, rather than each individual meal.  Fruits and vegetables  · Make half of your plate fruits and vegetables.  · Eat many different colors of fruits and vegetables each day.  · For a 2,000 calorie daily food plan, eat:  ? 2½ cups of vegetables every day.  ? 2 cups of fruit every day.  · 1 cup is equal to:  ? 1 cup raw or cooked vegetables.  ? 1 cup raw fruit.  ? 1 medium-sized orange, apple, or banana.  ? 1 cup 100% fruit or vegetable juice.  ? 2 cups raw leafy greens, such as lettuce, spinach, or kale.  ? ½ cup dried fruit.  Grains  · One fourth of your plate should be grains.  · Make at least half of the grains you eat each day whole grains.  · For a 2,000 calorie daily food plan, eat 6 oz of grains every day.  · 1 oz is equal to:  ? 1 slice bread.  ? 1 cup cereal.  ? ½ cup cooked rice, cereal, or pasta.  Protein  · One fourth of your plate should be protein.  · Eat a wide variety of protein foods, including meat, poultry, fish, eggs, beans, nuts, and tofu.  · For a 2,000 calorie daily food plan, eat 5½ oz of protein every day.  · 1 oz is equal to:  ? 1 oz meat, poultry, or fish.  ? ¼ cup cooked beans.  ? 1 egg.  ? ½ oz nuts or seeds.  ? 1 Tbsp peanut butter.  Dairy  · Drink fat-free or low-fat (1%) milk.  · Eat or drink dairy as a side to meals.  · For a 2,000 calorie daily food plan, eat or drink 3 cups of dairy every day.  · 1 cup is equal to:  ? 1 cup milk, yogurt, cottage cheese, or soy milk (soy beverage).  ? 2 oz processed cheese.  ? 1½ oz natural cheese.  Fats, oils, salt, and sugars  · Only small amounts of oils are recommended.  · Avoid foods that are high in calories and low in nutritional value (empty calories), like foods high in fat or added sugars.  · Choose foods that  are low in salt (sodium). Choose foods that have less than 140 milligrams (mg) of sodium per serving.  · Drink water instead of sugary drinks. Drink enough water each day to keep your urine pale yellow.  Where to find support  · Work with your health care provider or a nutrition specialist (dietitian) to develop a customized eating plan that is right for you.  · Download an taylor (mobile application) to help you track your daily food intake.  Where to find more information  · Go to ChooseMyPlate.gov for more information.  Summary  · MyPlate is a general guideline for healthy eating from the USDA. It is based on the 2010 Dietary Guidelines for Americans.  · In general, fruits and vegetables should take up ½ of your plate, grains should take up ¼ of your plate, and protein should take up ¼ of your plate.  This information is not intended to replace advice given to you by your health care provider. Make sure you discuss any questions you have with your health care provider.  Document Revised: 05/21/2020 Document Reviewed: 03/19/2018  [employer] Patient Education © 2020 [employer]      Exercising to Stay Healthy  To become healthy and stay healthy, it is recommended that you do moderate-intensity and vigorous-intensity exercise. You can tell that you are exercising at a moderate intensity if your heart starts beating faster and you start breathing faster but can still hold a conversation. You can tell that you are exercising at a vigorous intensity if you are breathing much harder and faster and cannot hold a conversation while exercising.  Exercising regularly is important. It has many health benefits, such as:  · Improving overall fitness, flexibility, and endurance.  · Increasing bone density.  · Helping with weight control.  · Decreasing body fat.  · Increasing muscle strength.  · Reducing stress and tension.  · Improving overall health.  How often should I exercise?  Choose an activity that you enjoy, and set  realistic goals. Your health care provider can help you make an activity plan that works for you.  Exercise regularly as told by your health care provider. This may include:  · Doing strength training two times a week, such as:  ? Lifting weights.  ? Using resistance bands.  ? Push-ups.  ? Sit-ups.  ? Yoga.  · Doing a certain intensity of exercise for a given amount of time. Choose from these options:  ? A total of 150 minutes of moderate-intensity exercise every week.  ? A total of 75 minutes of vigorous-intensity exercise every week.  ? A mix of moderate-intensity and vigorous-intensity exercise every week.  Children, pregnant women, people who have not exercised regularly, people who are overweight, and older adults may need to talk with a health care provider about what activities are safe to do. If you have a medical condition, be sure to talk with your health care provider before you start a new exercise program.  What are some exercise ideas?  Moderate-intensity exercise ideas include:  · Walking 1 mile (1.6 km) in about 15 minutes.  · Biking.  · Hiking.  · Golfing.  · Dancing.  · Water aerobics.  Vigorous-intensity exercise ideas include:  · Walking 4.5 miles (7.2 km) or more in about 1 hour.  · Jogging or running 5 miles (8 km) in about 1 hour.  · Biking 10 miles (16.1 km) or more in about 1 hour.  · Lap swimming.  · Roller-skating or in-line skating.  · Cross-country skiing.  · Vigorous competitive sports, such as football, basketball, and soccer.  · Jumping rope.  · Aerobic dancing.  What are some everyday activities that can help me to get exercise?  · Yard work, such as:  ? Pushing a .  ? Raking and bagging leaves.  · Washing your car.  · Pushing a stroller.  · Shoveling snow.  · Gardening.  · Washing windows or floors.  How can I be more active in my day-to-day activities?  · Use stairs instead of an elevator.  · Take a walk during your lunch break.  · If you drive, park your car farther away  from your work or school.  · If you take public transportation, get off one stop early and walk the rest of the way.  · Stand up or walk around during all of your indoor phone calls.  · Get up, stretch, and walk around every 30 minutes throughout the day.  · Enjoy exercise with a friend. Support to continue exercising will help you keep a regular routine of activity.  What guidelines can I follow while exercising?  · Before you start a new exercise program, talk with your health care provider.  · Do not exercise so much that you hurt yourself, feel dizzy, or get very short of breath.  · Wear comfortable clothes and wear shoes with good support.  · Drink plenty of water while you exercise to prevent dehydration or heat stroke.  · Work out until your breathing and your heartbeat get faster.  Where to find more information  · [place] Department of Health and Human Services: www.hhs.gov  · Centers for Disease Control and Prevention (CDC): www.cdc.gov  Summary  · Exercising regularly is important. It will improve your overall fitness, flexibility, and endurance.  · Regular exercise also will improve your overall health. It can help you control your weight, reduce stress, and improve your bone density.  · Do not exercise so much that you hurt yourself, feel dizzy, or get very short of breath.  · Before you start a new exercise program, talk with your health care provider.  This information is not intended to replace advice given to you by your health care provider. Make sure you discuss any questions you have with your health care provider.  Document Revised: 11/30/2018 Document Reviewed: 11/08/2018  Elsevier Patient Education © 2020 [employer].

## 2021-02-24 LAB
ALBUMIN SERPL-MCNC: 4.2 G/DL (ref 3.5–5.2)
ALBUMIN/GLOB SERPL: 1.8 G/DL
ALP SERPL-CCNC: 70 U/L (ref 39–117)
ALT SERPL-CCNC: 19 U/L (ref 1–33)
AST SERPL-CCNC: 25 U/L (ref 1–32)
BASOPHILS # BLD AUTO: 0.04 10*3/MM3 (ref 0–0.2)
BASOPHILS NFR BLD AUTO: 0.6 % (ref 0–1.5)
BILIRUB SERPL-MCNC: 0.4 MG/DL (ref 0–1.2)
BUN SERPL-MCNC: 12 MG/DL (ref 8–23)
BUN/CREAT SERPL: 19 (ref 7–25)
CALCIUM SERPL-MCNC: 9.8 MG/DL (ref 8.6–10.5)
CHLORIDE SERPL-SCNC: 103 MMOL/L (ref 98–107)
CHOLEST SERPL-MCNC: 213 MG/DL (ref 0–200)
CO2 SERPL-SCNC: 30 MMOL/L (ref 22–29)
CREAT SERPL-MCNC: 0.63 MG/DL (ref 0.57–1)
EOSINOPHIL # BLD AUTO: 0.1 10*3/MM3 (ref 0–0.4)
EOSINOPHIL NFR BLD AUTO: 1.4 % (ref 0.3–6.2)
ERYTHROCYTE [DISTWIDTH] IN BLOOD BY AUTOMATED COUNT: 12.9 % (ref 12.3–15.4)
GLOBULIN SER CALC-MCNC: 2.3 GM/DL
GLUCOSE SERPL-MCNC: 102 MG/DL (ref 65–99)
HCT VFR BLD AUTO: 44.4 % (ref 34–46.6)
HDLC SERPL-MCNC: 46 MG/DL (ref 40–60)
HGB BLD-MCNC: 14.3 G/DL (ref 12–15.9)
IMM GRANULOCYTES # BLD AUTO: 0.02 10*3/MM3 (ref 0–0.05)
IMM GRANULOCYTES NFR BLD AUTO: 0.3 % (ref 0–0.5)
LDLC SERPL CALC-MCNC: 125 MG/DL (ref 0–100)
LYMPHOCYTES # BLD AUTO: 2.45 10*3/MM3 (ref 0.7–3.1)
LYMPHOCYTES NFR BLD AUTO: 35 % (ref 19.6–45.3)
MCH RBC QN AUTO: 28.7 PG (ref 26.6–33)
MCHC RBC AUTO-ENTMCNC: 32.2 G/DL (ref 31.5–35.7)
MCV RBC AUTO: 89 FL (ref 79–97)
MONOCYTES # BLD AUTO: 0.56 10*3/MM3 (ref 0.1–0.9)
MONOCYTES NFR BLD AUTO: 8 % (ref 5–12)
NEUTROPHILS # BLD AUTO: 3.83 10*3/MM3 (ref 1.7–7)
NEUTROPHILS NFR BLD AUTO: 54.7 % (ref 42.7–76)
NRBC BLD AUTO-RTO: 0 /100 WBC (ref 0–0.2)
PLATELET # BLD AUTO: 375 10*3/MM3 (ref 140–450)
POTASSIUM SERPL-SCNC: 5.2 MMOL/L (ref 3.5–5.2)
PROT SERPL-MCNC: 6.5 G/DL (ref 6–8.5)
RBC # BLD AUTO: 4.99 10*6/MM3 (ref 3.77–5.28)
SODIUM SERPL-SCNC: 142 MMOL/L (ref 136–145)
TRIGL SERPL-MCNC: 240 MG/DL (ref 0–150)
VLDLC SERPL CALC-MCNC: 42 MG/DL (ref 5–40)
WBC # BLD AUTO: 7 10*3/MM3 (ref 3.4–10.8)

## 2021-03-24 DIAGNOSIS — E78.2 MIXED HYPERLIPIDEMIA: Primary | ICD-10-CM

## 2021-03-24 DIAGNOSIS — I10 BENIGN ESSENTIAL HYPERTENSION: ICD-10-CM

## 2021-03-24 RX ORDER — LISINOPRIL AND HYDROCHLOROTHIAZIDE 20; 12.5 MG/1; MG/1
TABLET ORAL
Qty: 45 TABLET | Refills: 1 | Status: SHIPPED | OUTPATIENT
Start: 2021-03-24 | End: 2021-05-17 | Stop reason: SDUPTHER

## 2021-03-24 RX ORDER — SIMVASTATIN 20 MG
TABLET ORAL
Qty: 90 TABLET | Refills: 1 | Status: SHIPPED | OUTPATIENT
Start: 2021-03-24 | End: 2021-05-17 | Stop reason: SDUPTHER

## 2021-05-17 ENCOUNTER — OFFICE VISIT (OUTPATIENT)
Dept: INTERNAL MEDICINE | Facility: CLINIC | Age: 71
End: 2021-05-17

## 2021-05-17 VITALS
OXYGEN SATURATION: 97 % | HEART RATE: 78 BPM | TEMPERATURE: 97.7 F | DIASTOLIC BLOOD PRESSURE: 77 MMHG | RESPIRATION RATE: 14 BRPM | HEIGHT: 63 IN | SYSTOLIC BLOOD PRESSURE: 130 MMHG | WEIGHT: 138 LBS | BODY MASS INDEX: 24.45 KG/M2

## 2021-05-17 DIAGNOSIS — D48.9 NEOPLASM OF UNCERTAIN BEHAVIOR: ICD-10-CM

## 2021-05-17 DIAGNOSIS — I10 BENIGN ESSENTIAL HYPERTENSION: Primary | ICD-10-CM

## 2021-05-17 DIAGNOSIS — E78.2 MIXED HYPERLIPIDEMIA: ICD-10-CM

## 2021-05-17 DIAGNOSIS — R73.01 IMPAIRED FASTING GLUCOSE: ICD-10-CM

## 2021-05-17 LAB
ALBUMIN SERPL-MCNC: 4.6 G/DL (ref 3.5–5.2)
ALBUMIN/GLOB SERPL: 2.7 G/DL
ALP SERPL-CCNC: 69 U/L (ref 39–117)
ALT SERPL-CCNC: 14 U/L (ref 1–33)
AST SERPL-CCNC: 18 U/L (ref 1–32)
BASOPHILS # BLD AUTO: 0.04 10*3/MM3 (ref 0–0.2)
BASOPHILS NFR BLD AUTO: 0.6 % (ref 0–1.5)
BILIRUB SERPL-MCNC: 0.5 MG/DL (ref 0–1.2)
BUN SERPL-MCNC: 14 MG/DL (ref 8–23)
BUN/CREAT SERPL: 20.9 (ref 7–25)
CALCIUM SERPL-MCNC: 9.9 MG/DL (ref 8.6–10.5)
CHLORIDE SERPL-SCNC: 104 MMOL/L (ref 98–107)
CHOLEST SERPL-MCNC: 186 MG/DL (ref 0–200)
CO2 SERPL-SCNC: 28.3 MMOL/L (ref 22–29)
CREAT SERPL-MCNC: 0.67 MG/DL (ref 0.57–1)
EOSINOPHIL # BLD AUTO: 0.08 10*3/MM3 (ref 0–0.4)
EOSINOPHIL NFR BLD AUTO: 1.3 % (ref 0.3–6.2)
ERYTHROCYTE [DISTWIDTH] IN BLOOD BY AUTOMATED COUNT: 12.9 % (ref 12.3–15.4)
GLOBULIN SER CALC-MCNC: 1.7 GM/DL
GLUCOSE SERPL-MCNC: 91 MG/DL (ref 65–99)
HBA1C MFR BLD: 5.6 % (ref 4.8–5.6)
HCT VFR BLD AUTO: 42.7 % (ref 34–46.6)
HDLC SERPL-MCNC: 51 MG/DL (ref 40–60)
HGB BLD-MCNC: 13.9 G/DL (ref 12–15.9)
IMM GRANULOCYTES # BLD AUTO: 0.01 10*3/MM3 (ref 0–0.05)
IMM GRANULOCYTES NFR BLD AUTO: 0.2 % (ref 0–0.5)
LDLC SERPL CALC-MCNC: 106 MG/DL (ref 0–100)
LYMPHOCYTES # BLD AUTO: 2.23 10*3/MM3 (ref 0.7–3.1)
LYMPHOCYTES NFR BLD AUTO: 35.9 % (ref 19.6–45.3)
MCH RBC QN AUTO: 28 PG (ref 26.6–33)
MCHC RBC AUTO-ENTMCNC: 32.6 G/DL (ref 31.5–35.7)
MCV RBC AUTO: 86.1 FL (ref 79–97)
MONOCYTES # BLD AUTO: 0.56 10*3/MM3 (ref 0.1–0.9)
MONOCYTES NFR BLD AUTO: 9 % (ref 5–12)
NEUTROPHILS # BLD AUTO: 3.3 10*3/MM3 (ref 1.7–7)
NEUTROPHILS NFR BLD AUTO: 53 % (ref 42.7–76)
NRBC BLD AUTO-RTO: 0 /100 WBC (ref 0–0.2)
PLATELET # BLD AUTO: 400 10*3/MM3 (ref 140–450)
POTASSIUM SERPL-SCNC: 4.6 MMOL/L (ref 3.5–5.2)
PROT SERPL-MCNC: 6.3 G/DL (ref 6–8.5)
RBC # BLD AUTO: 4.96 10*6/MM3 (ref 3.77–5.28)
SODIUM SERPL-SCNC: 144 MMOL/L (ref 136–145)
TRIGL SERPL-MCNC: 167 MG/DL (ref 0–150)
VLDLC SERPL CALC-MCNC: 29 MG/DL (ref 5–40)
WBC # BLD AUTO: 6.22 10*3/MM3 (ref 3.4–10.8)

## 2021-05-17 PROCEDURE — 99214 OFFICE O/P EST MOD 30 MIN: CPT | Performed by: INTERNAL MEDICINE

## 2021-05-17 RX ORDER — SIMVASTATIN 20 MG
20 TABLET ORAL NIGHTLY
Qty: 90 TABLET | Refills: 1 | Status: SHIPPED | OUTPATIENT
Start: 2021-05-17 | End: 2021-10-21 | Stop reason: SDUPTHER

## 2021-05-17 RX ORDER — LISINOPRIL AND HYDROCHLOROTHIAZIDE 20; 12.5 MG/1; MG/1
0.5 TABLET ORAL DAILY
Qty: 45 TABLET | Refills: 2 | Status: SHIPPED | OUTPATIENT
Start: 2021-05-17 | End: 2021-10-21 | Stop reason: SDUPTHER

## 2021-05-17 NOTE — PATIENT INSTRUCTIONS
MyPlate from USDA    MyPlate is an outline of a general healthy diet based on the 2010 Dietary Guidelines for Americans, from the U.S. Department of Agriculture (USDA). It sets guidelines for how much food you should eat from each food group based on your age, sex, and level of physical activity.  What are tips for following MyPlate?  To follow MyPlate recommendations:  · Eat a wide variety of fruits and vegetables, grains, and protein foods.  · Serve smaller portions and eat less food throughout the day.  · Limit portion sizes to avoid overeating.  · Enjoy your food.  · Get at least 150 minutes of exercise every week. This is about 30 minutes each day, 5 or more days per week.  It can be difficult to have every meal look like MyPlate. Think about MyPlate as eating guidelines for an entire day, rather than each individual meal.  Fruits and vegetables  · Make half of your plate fruits and vegetables.  · Eat many different colors of fruits and vegetables each day.  · For a 2,000 calorie daily food plan, eat:  ? 2½ cups of vegetables every day.  ? 2 cups of fruit every day.  · 1 cup is equal to:  ? 1 cup raw or cooked vegetables.  ? 1 cup raw fruit.  ? 1 medium-sized orange, apple, or banana.  ? 1 cup 100% fruit or vegetable juice.  ? 2 cups raw leafy greens, such as lettuce, spinach, or kale.  ? ½ cup dried fruit.  Grains  · One fourth of your plate should be grains.  · Make at least half of the grains you eat each day whole grains.  · For a 2,000 calorie daily food plan, eat 6 oz of grains every day.  · 1 oz is equal to:  ? 1 slice bread.  ? 1 cup cereal.  ? ½ cup cooked rice, cereal, or pasta.  Protein  · One fourth of your plate should be protein.  · Eat a wide variety of protein foods, including meat, poultry, fish, eggs, beans, nuts, and tofu.  · For a 2,000 calorie daily food plan, eat 5½ oz of protein every day.  · 1 oz is equal to:  ? 1 oz meat, poultry, or fish.  ? ¼ cup cooked beans.  ? 1 egg.  ? ½ oz nuts  or seeds.  ? 1 Tbsp peanut butter.  Dairy  · Drink fat-free or low-fat (1%) milk.  · Eat or drink dairy as a side to meals.  · For a 2,000 calorie daily food plan, eat or drink 3 cups of dairy every day.  · 1 cup is equal to:  ? 1 cup milk, yogurt, cottage cheese, or soy milk (soy beverage).  ? 2 oz processed cheese.  ? 1½ oz natural cheese.  Fats, oils, salt, and sugars  · Only small amounts of oils are recommended.  · Avoid foods that are high in calories and low in nutritional value (empty calories), like foods high in fat or added sugars.  · Choose foods that are low in salt (sodium). Choose foods that have less than 140 milligrams (mg) of sodium per serving.  · Drink water instead of sugary drinks. Drink enough water each day to keep your urine pale yellow.  Where to find support  · Work with your health care provider or a nutrition specialist (dietitian) to develop a customized eating plan that is right for you.  · Download an taylor (mobile application) to help you track your daily food intake.  Where to find more information  · Go to ChooseMyPlate.gov for more information.  Summary  · MyPlate is a general guideline for healthy eating from the USDA. It is based on the 2010 Dietary Guidelines for Americans.  · In general, fruits and vegetables should take up ½ of your plate, grains should take up ¼ of your plate, and protein should take up ¼ of your plate.  This information is not intended to replace advice given to you by your health care provider. Make sure you discuss any questions you have with your health care provider.  Document Revised: 05/21/2020 Document Reviewed: 03/19/2018  Elsevier Patient Education © 2021 [employer] Inc.

## 2021-05-17 NOTE — PROGRESS NOTES
"Chief Complaint  Hypertension, Hyperlipidemia, and Rash    Subjective          Sahara Hardwick presents to [facility]  History of Present Illness  HPI: Patient is here to follow up on the blood pressure  The patient is taking the blood pressure medications as prescribed and has had no side effects. The patient is also here to follow up on the cholesterol and is trying to follow a diet. The patient is  also here to follow up on sugar and is  due to get lab work done .  The patient also needs refills on medications . Patient also complains of a nonhealing lesion on the right thigh area for the past several months  Hyperlipidemia   Pertinent negatives include no chest pain or shortness of breath.   Hypertension   Pertinent negatives include no chest pain, palpitations or shortness of breath.  Answers for HPI/ROS submitted by the patient on 5/10/2021  What is the primary reason for your visit?: High Blood Pressure      Objective   Vital Signs:   /77   Pulse 78   Temp 97.7 °F (36.5 °C)   Resp 14   Ht 160 cm (62.99\")   Wt 62.6 kg (138 lb)   SpO2 97%   BMI 24.45 kg/m²     Physical Exam  Vitals and nursing note reviewed.   Constitutional:       General: She is not in acute distress.     Appearance: Normal appearance. She is not diaphoretic.   HENT:      Head: Normocephalic and atraumatic.      Right Ear: External ear normal.      Left Ear: External ear normal.      Nose: Nose normal.   Eyes:      Extraocular Movements: Extraocular movements intact.      Conjunctiva/sclera: Conjunctivae normal.   Neck:      Trachea: Trachea normal.   Cardiovascular:      Rate and Rhythm: Normal rate and regular rhythm.      Heart sounds: Normal heart sounds.   Pulmonary:      Effort: Pulmonary effort is normal. No respiratory distress.      Breath sounds: Normal breath sounds.   Abdominal:      General: Abdomen is flat.   Musculoskeletal:      Cervical back: Neck supple.      Comments: Moves all limbs "   Skin:     General: Skin is warm.      Findings: Lesion present.      Comments: Right thigh lesion   Neurological:      Mental Status: She is alert and oriented to person, place, and time.      Comments: No gross motor or sensory deficits        Result Review :     Common labs    Common Labsle 7/22/20 7/22/20 7/22/20 2/23/21 2/23/21 2/23/21    1011 1011 1011 0825 0825 0825   Glucose  93   102 (A)    BUN  15   12    Creatinine  0.67   0.63    eGFR Non  Am  88   94    eGFR  Am  106   114    Sodium  143   142    Potassium  5.0   5.2    Chloride  103   103    Calcium  9.8   9.8    Total Protein  6.5   6.5    Albumin  4.50   4.20    Total Bilirubin  0.4   0.4    Alkaline Phosphatase  53   70    AST (SGOT)  17   25    ALT (SGPT)  14   19    WBC 7.26   7.00     Hemoglobin 13.9   14.3     Hematocrit 41.5   44.4     Platelets 352   375     Total Cholesterol   186   213 (A)   Triglycerides   211 (A)   240 (A)   HDL Cholesterol   46   46   LDL Cholesterol    98   125 (A)   (A) Abnormal value       Comments are available for some flowsheets but are not being displayed.                     Assessment and Plan    Diagnoses and all orders for this visit:    1. Benign essential hypertension (Primary)  -     lisinopril-hydrochlorothiazide (PRINZIDE,ZESTORETIC) 20-12.5 MG per tablet; Take 0.5 tablets by mouth Daily.  Dispense: 45 tablet; Refill: 2    2. Mixed hyperlipidemia  -     Cancel: CBC & Differential  -     Cancel: Comprehensive Metabolic Panel  -     Cancel: Lipid Panel  -     CBC & Differential  -     Comprehensive Metabolic Panel  -     Lipid Panel  -     simvastatin (ZOCOR) 20 MG tablet; Take 1 tablet by mouth Every Night.  Dispense: 90 tablet; Refill: 1    3. Impaired fasting glucose  -     Hemoglobin A1c    4. Neoplasm of uncertain behavior  -     Ambulatory Referral to Dermatology    Plan:  1.  Benign essential hypertension: Will continue current medication, low-sodium diet advised, Counseled to regularly  check BP at home with goal averaging <130/80.   2.mixed hyperlipidemia: Obtain   fasting CMP and lipid panel.  Diet and exercise counseled,  Will continue current medications  3. impaired glucose   : Obtain   fasting CMP  and hba1c  , diet and exercise counseled  4. Neoplasm of uncertain behavior: We will refer patient to dermatology for further evaluation  I spent 30 minutes caring for Sahara on this date of service. This time includes time spent by me in the following activities:preparing for the visit, reviewing tests, performing a medically appropriate examination and/or evaluation , counseling and educating the patient/family/caregiver, ordering medications, tests, or procedures and documenting information in the medical record  Follow Up   Return in about 4 months (around 9/22/2021).  Patient was given instructions and counseling regarding her condition or for health maintenance advice. Please see specific information pulled into the AVS if appropriate.

## 2021-10-04 DIAGNOSIS — E78.2 MIXED HYPERLIPIDEMIA: Primary | ICD-10-CM

## 2021-10-04 DIAGNOSIS — R73.01 IMPAIRED FASTING GLUCOSE: ICD-10-CM

## 2021-10-13 LAB
ALBUMIN SERPL-MCNC: 4.6 G/DL (ref 3.5–5.2)
ALBUMIN/GLOB SERPL: 2.9 G/DL
ALP SERPL-CCNC: 67 U/L (ref 39–117)
ALT SERPL-CCNC: 16 U/L (ref 1–33)
AST SERPL-CCNC: 22 U/L (ref 1–32)
BASOPHILS # BLD AUTO: 0.03 10*3/MM3 (ref 0–0.2)
BASOPHILS NFR BLD AUTO: 0.5 % (ref 0–1.5)
BILIRUB SERPL-MCNC: 0.6 MG/DL (ref 0–1.2)
BUN SERPL-MCNC: 11 MG/DL (ref 8–23)
BUN/CREAT SERPL: 15.5 (ref 7–25)
CALCIUM SERPL-MCNC: 9.6 MG/DL (ref 8.6–10.5)
CHLORIDE SERPL-SCNC: 103 MMOL/L (ref 98–107)
CHOLEST SERPL-MCNC: 188 MG/DL (ref 0–200)
CO2 SERPL-SCNC: 28 MMOL/L (ref 22–29)
CREAT SERPL-MCNC: 0.71 MG/DL (ref 0.57–1)
EOSINOPHIL # BLD AUTO: 0.09 10*3/MM3 (ref 0–0.4)
EOSINOPHIL NFR BLD AUTO: 1.4 % (ref 0.3–6.2)
ERYTHROCYTE [DISTWIDTH] IN BLOOD BY AUTOMATED COUNT: 13.1 % (ref 12.3–15.4)
GLOBULIN SER CALC-MCNC: 1.6 GM/DL
GLUCOSE SERPL-MCNC: 93 MG/DL (ref 65–99)
HBA1C MFR BLD: 5.7 % (ref 4.8–5.6)
HCT VFR BLD AUTO: 45.5 % (ref 34–46.6)
HDLC SERPL-MCNC: 47 MG/DL (ref 40–60)
HGB BLD-MCNC: 14.1 G/DL (ref 12–15.9)
IMM GRANULOCYTES # BLD AUTO: 0.02 10*3/MM3 (ref 0–0.05)
IMM GRANULOCYTES NFR BLD AUTO: 0.3 % (ref 0–0.5)
LDLC SERPL CALC-MCNC: 111 MG/DL (ref 0–100)
LYMPHOCYTES # BLD AUTO: 2.63 10*3/MM3 (ref 0.7–3.1)
LYMPHOCYTES NFR BLD AUTO: 40.8 % (ref 19.6–45.3)
MCH RBC QN AUTO: 27.9 PG (ref 26.6–33)
MCHC RBC AUTO-ENTMCNC: 31 G/DL (ref 31.5–35.7)
MCV RBC AUTO: 90.1 FL (ref 79–97)
MONOCYTES # BLD AUTO: 0.6 10*3/MM3 (ref 0.1–0.9)
MONOCYTES NFR BLD AUTO: 9.3 % (ref 5–12)
NEUTROPHILS # BLD AUTO: 3.07 10*3/MM3 (ref 1.7–7)
NEUTROPHILS NFR BLD AUTO: 47.7 % (ref 42.7–76)
NRBC BLD AUTO-RTO: 0 /100 WBC (ref 0–0.2)
PLATELET # BLD AUTO: 361 10*3/MM3 (ref 140–450)
POTASSIUM SERPL-SCNC: 4.6 MMOL/L (ref 3.5–5.2)
PROT SERPL-MCNC: 6.2 G/DL (ref 6–8.5)
RBC # BLD AUTO: 5.05 10*6/MM3 (ref 3.77–5.28)
SODIUM SERPL-SCNC: 144 MMOL/L (ref 136–145)
TRIGL SERPL-MCNC: 169 MG/DL (ref 0–150)
VLDLC SERPL CALC-MCNC: 30 MG/DL (ref 5–40)
WBC # BLD AUTO: 6.44 10*3/MM3 (ref 3.4–10.8)

## 2021-10-18 ENCOUNTER — OFFICE VISIT (OUTPATIENT)
Dept: INTERNAL MEDICINE | Facility: CLINIC | Age: 71
End: 2021-10-18

## 2021-10-18 VITALS
SYSTOLIC BLOOD PRESSURE: 129 MMHG | OXYGEN SATURATION: 96 % | WEIGHT: 139 LBS | HEIGHT: 65 IN | DIASTOLIC BLOOD PRESSURE: 70 MMHG | BODY MASS INDEX: 23.16 KG/M2 | HEART RATE: 69 BPM | RESPIRATION RATE: 16 BRPM | TEMPERATURE: 97.5 F

## 2021-10-18 DIAGNOSIS — I10 BENIGN ESSENTIAL HYPERTENSION: Primary | ICD-10-CM

## 2021-10-18 DIAGNOSIS — E78.2 MIXED HYPERLIPIDEMIA: ICD-10-CM

## 2021-10-18 DIAGNOSIS — R73.01 IMPAIRED FASTING GLUCOSE: ICD-10-CM

## 2021-10-18 PROCEDURE — 99214 OFFICE O/P EST MOD 30 MIN: CPT | Performed by: INTERNAL MEDICINE

## 2021-10-18 NOTE — PROGRESS NOTES
"Chief Complaint  Hypertension and Hyperlipidemia    Subjective          Sahara Hardwick presents to [facility]  History of Present Illness  HPI: Patient is here to follow up on the blood pressure  The patient is taking the blood pressure medications as prescribed and has had no side effects. The patient is also here to follow up on the cholesterol and sugar and  is trying to follow a diet. The patient   had lab work done .  The patient also needs refills on medications .   Hyperlipidemia   Pertinent negatives include no chest pain or shortness of breath.   Hypertension   Pertinent negatives include no chest pain, palpitations or shortness of breath.  Answers for HPI/ROS submitted by the patient on 10/11/2021  What is the primary reason for your visit?: High Blood Pressure      Objective   Vital Signs:   /70   Pulse 69   Temp 97.5 °F (36.4 °C)   Resp 16   Ht 165.1 cm (65\")   Wt 63 kg (139 lb)   SpO2 96%   BMI 23.13 kg/m²     Physical Exam  Vitals and nursing note reviewed.   Constitutional:       General: She is not in acute distress.     Appearance: Normal appearance. She is not diaphoretic.   HENT:      Head: Normocephalic and atraumatic.      Right Ear: External ear normal.      Left Ear: External ear normal.      Nose: Nose normal.   Eyes:      Extraocular Movements: Extraocular movements intact.      Conjunctiva/sclera: Conjunctivae normal.   Neck:      Trachea: Trachea normal.   Cardiovascular:      Rate and Rhythm: Normal rate and regular rhythm.      Heart sounds: Normal heart sounds.   Pulmonary:      Effort: Pulmonary effort is normal. No respiratory distress.      Breath sounds: Normal breath sounds.   Abdominal:      General: Abdomen is flat.   Musculoskeletal:      Cervical back: Neck supple.      Comments: Moves all limbs   Skin:     General: Skin is warm.   Neurological:      Mental Status: She is alert and oriented to person, place, and time.      Comments: No " gross motor or sensory deficits        Result Review :     Common labs    Common Labsle 2/23/21 2/23/21 2/23/21 5/17/21 5/17/21 5/17/21 5/17/21 10/12/21 10/12/21 10/12/21 10/12/21    0825 0825 0825 0952 0952 0952 0952 0833 0833 0833 0833   Glucose  102 (A)   91    93     BUN  12   14    11     Creatinine  0.63   0.67    0.71     eGFR Non  Am  94   87    81     eGFR  Am  114   106    99     Sodium  142   144    144     Potassium  5.2   4.6    4.6     Chloride  103   104    103     Calcium  9.8   9.9    9.6     Total Protein  6.5   6.3    6.2     Albumin  4.20   4.60    4.60     Total Bilirubin  0.4   0.5    0.6     Alkaline Phosphatase  70   69    67     AST (SGOT)  25   18    22     ALT (SGPT)  19   14    16     WBC 7.00   6.22    6.44      Hemoglobin 14.3   13.9    14.1      Hematocrit 44.4   42.7    45.5      Platelets 375   400    361      Total Cholesterol   213 (A)   186    188    Triglycerides   240 (A)   167 (A)    169 (A)    HDL Cholesterol   46   51    47    LDL Cholesterol    125 (A)   106 (A)    111 (A)    Hemoglobin A1C       5.60    5.70 (A)   (A) Abnormal value       Comments are available for some flowsheets but are not being displayed.                     Assessment and Plan    Diagnoses and all orders for this visit:    1. Benign essential hypertension (Primary)  -     lisinopril-hydrochlorothiazide (PRINZIDE,ZESTORETIC) 20-12.5 MG per tablet; Take 0.5 tablets by mouth Daily.  Dispense: 45 tablet; Refill: 2    2. Mixed hyperlipidemia  -     CBC & Differential  -     Comprehensive Metabolic Panel  -     Lipid Panel  -     simvastatin (ZOCOR) 20 MG tablet; Take 1 tablet by mouth Every Night.  Dispense: 90 tablet; Refill: 1    3. Impaired fasting glucose  -     Hemoglobin A1c    Plan:  1.  Benign essential hypertension: Will continue current medication, low-sodium diet advised, Counseled to regularly check BP at home with goal averaging <130/80.   2.mixed hyperlipidemia:  reviewed   fasting CMP and lipid panel.  Diet and exercise counseled,  Will continue current medications  3. impaired glucose   :   reviewed  fasting CMP  and hba1c  , diet and exercise counseled     I spent 30 minutes caring for Sahara on this date of service. This time includes time spent by me in the following activities:preparing for the visit, reviewing tests, performing a medically appropriate examination and/or evaluation , counseling and educating the patient/family/caregiver, ordering medications, tests, or procedures and documenting information in the medical record  Follow Up   Return in about 4 months (around 3/2/2022).  Patient was given instructions and counseling regarding her condition or for health maintenance advice. Please see specific information pulled into the AVS if appropriate.

## 2021-10-21 RX ORDER — SIMVASTATIN 20 MG
20 TABLET ORAL NIGHTLY
Qty: 90 TABLET | Refills: 1 | Status: SHIPPED | OUTPATIENT
Start: 2021-10-21 | End: 2022-02-17

## 2021-10-21 RX ORDER — LISINOPRIL AND HYDROCHLOROTHIAZIDE 20; 12.5 MG/1; MG/1
0.5 TABLET ORAL DAILY
Qty: 45 TABLET | Refills: 2 | Status: SHIPPED | OUTPATIENT
Start: 2021-10-21 | End: 2022-06-09

## 2022-02-16 DIAGNOSIS — E78.2 MIXED HYPERLIPIDEMIA: ICD-10-CM

## 2022-02-17 RX ORDER — SIMVASTATIN 20 MG
TABLET ORAL
Qty: 90 TABLET | Refills: 1 | Status: SHIPPED | OUTPATIENT
Start: 2022-02-17 | End: 2022-03-10

## 2022-02-17 NOTE — TELEPHONE ENCOUNTER
.Rx Refill Note  Requested Prescriptions     Pending Prescriptions Disp Refills    simvastatin (ZOCOR) 20 MG tablet [Pharmacy Med Name: SIMVASTATIN 20 MG TABLET] 90 tablet 1     Sig: TAKE ONE TABLET BY MOUTH EVERY NIGHT      Last office visit with prescribing clinician: 10/18/2021      Next office visit with prescribing clinician: 3/10/2022            Anahi Dyson MA  02/17/22, 08:19 EST

## 2022-03-02 LAB
ALBUMIN SERPL-MCNC: 4.3 G/DL (ref 3.5–5.2)
ALBUMIN/GLOB SERPL: 1.9 G/DL
ALP SERPL-CCNC: 72 U/L (ref 39–117)
ALT SERPL-CCNC: 19 U/L (ref 1–33)
AST SERPL-CCNC: 21 U/L (ref 1–32)
BASOPHILS # BLD AUTO: 0.04 10*3/MM3 (ref 0–0.2)
BASOPHILS NFR BLD AUTO: 0.5 % (ref 0–1.5)
BILIRUB SERPL-MCNC: 0.5 MG/DL (ref 0–1.2)
BUN SERPL-MCNC: 13 MG/DL (ref 8–23)
BUN/CREAT SERPL: 17.6 (ref 7–25)
CALCIUM SERPL-MCNC: 10 MG/DL (ref 8.6–10.5)
CHLORIDE SERPL-SCNC: 102 MMOL/L (ref 98–107)
CHOLEST SERPL-MCNC: 213 MG/DL (ref 0–200)
CO2 SERPL-SCNC: 27.1 MMOL/L (ref 22–29)
CREAT SERPL-MCNC: 0.74 MG/DL (ref 0.57–1)
EGFR GENE MUT ANL BLD/T: 87.2 ML/MIN/1.73
EOSINOPHIL # BLD AUTO: 0.2 10*3/MM3 (ref 0–0.4)
EOSINOPHIL NFR BLD AUTO: 2.5 % (ref 0.3–6.2)
ERYTHROCYTE [DISTWIDTH] IN BLOOD BY AUTOMATED COUNT: 13.1 % (ref 12.3–15.4)
GLOBULIN SER CALC-MCNC: 2.3 GM/DL
GLUCOSE SERPL-MCNC: 98 MG/DL (ref 65–99)
HBA1C MFR BLD: 5.7 % (ref 4.8–5.6)
HCT VFR BLD AUTO: 43.9 % (ref 34–46.6)
HDLC SERPL-MCNC: 45 MG/DL (ref 40–60)
HGB BLD-MCNC: 14.1 G/DL (ref 12–15.9)
IMM GRANULOCYTES # BLD AUTO: 0.02 10*3/MM3 (ref 0–0.05)
IMM GRANULOCYTES NFR BLD AUTO: 0.2 % (ref 0–0.5)
LDLC SERPL CALC-MCNC: 133 MG/DL (ref 0–100)
LYMPHOCYTES # BLD AUTO: 2.78 10*3/MM3 (ref 0.7–3.1)
LYMPHOCYTES NFR BLD AUTO: 34.7 % (ref 19.6–45.3)
MCH RBC QN AUTO: 28.5 PG (ref 26.6–33)
MCHC RBC AUTO-ENTMCNC: 32.1 G/DL (ref 31.5–35.7)
MCV RBC AUTO: 88.7 FL (ref 79–97)
MONOCYTES # BLD AUTO: 0.9 10*3/MM3 (ref 0.1–0.9)
MONOCYTES NFR BLD AUTO: 11.2 % (ref 5–12)
NEUTROPHILS # BLD AUTO: 4.07 10*3/MM3 (ref 1.7–7)
NEUTROPHILS NFR BLD AUTO: 50.9 % (ref 42.7–76)
NRBC BLD AUTO-RTO: 0 /100 WBC (ref 0–0.2)
PLATELET # BLD AUTO: 386 10*3/MM3 (ref 140–450)
POTASSIUM SERPL-SCNC: 4.4 MMOL/L (ref 3.5–5.2)
PROT SERPL-MCNC: 6.6 G/DL (ref 6–8.5)
RBC # BLD AUTO: 4.95 10*6/MM3 (ref 3.77–5.28)
SODIUM SERPL-SCNC: 142 MMOL/L (ref 136–145)
TRIGL SERPL-MCNC: 194 MG/DL (ref 0–150)
VLDLC SERPL CALC-MCNC: 35 MG/DL (ref 5–40)
WBC # BLD AUTO: 8.01 10*3/MM3 (ref 3.4–10.8)

## 2022-03-10 ENCOUNTER — OFFICE VISIT (OUTPATIENT)
Dept: INTERNAL MEDICINE | Facility: CLINIC | Age: 72
End: 2022-03-10

## 2022-03-10 VITALS
DIASTOLIC BLOOD PRESSURE: 78 MMHG | RESPIRATION RATE: 14 BRPM | SYSTOLIC BLOOD PRESSURE: 147 MMHG | HEIGHT: 65 IN | WEIGHT: 144 LBS | OXYGEN SATURATION: 97 % | TEMPERATURE: 97.5 F | BODY MASS INDEX: 23.99 KG/M2 | HEART RATE: 73 BPM

## 2022-03-10 DIAGNOSIS — Z00.00 MEDICARE ANNUAL WELLNESS VISIT, SUBSEQUENT: Primary | ICD-10-CM

## 2022-03-10 DIAGNOSIS — Z00.00 ROUTINE GENERAL MEDICAL EXAMINATION AT A HEALTH CARE FACILITY: ICD-10-CM

## 2022-03-10 DIAGNOSIS — E78.2 MIXED HYPERLIPIDEMIA: ICD-10-CM

## 2022-03-10 DIAGNOSIS — I10 BENIGN ESSENTIAL HYPERTENSION: ICD-10-CM

## 2022-03-10 PROCEDURE — G0439 PPPS, SUBSEQ VISIT: HCPCS | Performed by: INTERNAL MEDICINE

## 2022-03-10 PROCEDURE — 1170F FXNL STATUS ASSESSED: CPT | Performed by: INTERNAL MEDICINE

## 2022-03-10 PROCEDURE — 1159F MED LIST DOCD IN RCRD: CPT | Performed by: INTERNAL MEDICINE

## 2022-03-10 RX ORDER — ROSUVASTATIN CALCIUM 10 MG/1
10 TABLET, COATED ORAL NIGHTLY
Qty: 90 TABLET | Refills: 2 | Status: SHIPPED | OUTPATIENT
Start: 2022-03-10 | End: 2022-07-19 | Stop reason: SDUPTHER

## 2022-03-10 NOTE — PROGRESS NOTES
The ABCs of the Annual Wellness Visit  Subsequent Medicare Wellness Visit    Chief Complaint   Patient presents with   • Medicare Wellness-subsequent   • Hypertension   • Hyperlipidemia      Subjective    History of Present Illness:  Sahara Hardwick is a 71 y.o. female who presents for a Subsequent Medicare Wellness Visit.   Patient is here to follow up on the blood pressure  The patient is taking the blood pressure medications as prescribed and has had no side effects. The patient is also here to follow up on the cholesterol and is trying to follow a diet. The patient had lab work done .  The patient also needs refills on medications .  She follows up with dermatology  Hyperlipidemia   Pertinent negatives include no chest pain or shortness of breath.   Hypertension   Pertinent negatives include no chest pain, palpitations or shortness of breath.      The following portions of the patient's history were reviewed and   updated as appropriate: allergies, current medications, past family history, past medical history, past social history, past surgical history and problem list.    Compared to one year ago, the patient feels her physical   health is the same.    Compared to one year ago, the patient feels her mental   health is the samej.    Recent Hospitalizations:  She was not admitted to the hospital during the last year.       Current Medical Providers:  Patient Care Team:  Shona Horn MD as PCP - General (Internal Medicine)    Outpatient Medications Prior to Visit   Medication Sig Dispense Refill   • acetaminophen (TYLENOL) 650 MG 8 hr tablet Take 650 mg by mouth Every 8 (Eight) Hours As Needed for Mild Pain .     • aspirin 81 MG EC tablet Take 81 mg by mouth Daily.     • cholecalciferol (VITAMIN D3) 25 MCG (1000 UT) tablet Take 2,000 Units by mouth Daily.     • lisinopril-hydrochlorothiazide (PRINZIDE,ZESTORETIC) 20-12.5 MG per tablet Take 0.5 tablets by mouth Daily. 45 tablet 2   • Multiple Vitamins-Minerals  "(MULTIVITAMIN ADULT PO) Take 1 tablet by mouth Daily.     • omeprazole (priLOSEC) 20 MG capsule Take 1 capsule by mouth Daily.     • tacrolimus (PROGRAF) 1 MG capsule Apply 1 mg to the mouth or throat Take As Directed. Empty contents of 1 capsule into 1 L distilled water; Once dissolved, swish and spit up to 4 times a day.     • simvastatin (ZOCOR) 20 MG tablet TAKE ONE TABLET BY MOUTH EVERY NIGHT 90 tablet 1     No facility-administered medications prior to visit.       No opioid medication identified on active medication list. I have reviewed chart for other potential  high risk medication/s and harmful drug interactions in the elderly.          Aspirin is on active medication list. Aspirin use is indicated based on review of current medical condition/s. Pros and cons of this therapy have been discussed today. Benefits of this medication outweigh potential harm.  Patient has been encouraged to continue taking this medication.  .      Patient Active Problem List   Diagnosis   • Primary osteoarthritis of right knee   • Benign essential hypertension   • Mixed hyperlipidemia     Advance Care Planning  Advance Directive is on file.  ACP discussion was held with the patient during this visit. Patient has an advance directive in EMR which is still valid.           Objective    Vitals:    03/10/22 1520   BP: 147/78   Pulse: 73   Resp: 14   Temp: 97.5 °F (36.4 °C)   SpO2: 97%   Weight: 65.3 kg (144 lb)   Height: 165.1 cm (65\")   PainSc: 0-No pain     BMI Readings from Last 1 Encounters:   03/10/22 23.96 kg/m²   BMI is within normal parameters. No follow-up required.    Does the patient have evidence of cognitive impairment? No    Physical Exam  Vitals and nursing note reviewed.   Constitutional:       General: She is not in acute distress.     Appearance: Normal appearance. She is not diaphoretic.   HENT:      Head: Normocephalic and atraumatic.      Right Ear: External ear normal.      Left Ear: External ear normal.      " Nose: Nose normal.   Eyes:      Extraocular Movements: Extraocular movements intact.      Conjunctiva/sclera: Conjunctivae normal.   Neck:      Trachea: Trachea normal.   Cardiovascular:      Rate and Rhythm: Normal rate and regular rhythm.      Heart sounds: Normal heart sounds.   Pulmonary:      Effort: Pulmonary effort is normal. No respiratory distress.      Breath sounds: Normal breath sounds.   Abdominal:      General: Abdomen is flat.   Musculoskeletal:      Cervical back: Neck supple.      Comments: Moves all limbs   Skin:     General: Skin is warm.   Neurological:      Mental Status: She is alert and oriented to person, place, and time.      Comments: No gross motor or sensory deficits       Lab Results   Component Value Date    CHLPL 213 (H) 03/01/2022    TRIG 194 (H) 03/01/2022    HDL 45 03/01/2022     (H) 03/01/2022    VLDL 35 03/01/2022    HGBA1C 5.70 (H) 03/01/2022            HEALTH RISK ASSESSMENT    Smoking Status:  Social History     Tobacco Use   Smoking Status Never Smoker   Smokeless Tobacco Never Used     Alcohol Consumption:  Social History     Substance and Sexual Activity   Alcohol Use No     Fall Risk Screen:    STEADI Fall Risk Assessment was completed, and patient is at LOW risk for falls.Assessment completed on:3/10/2022    Depression Screening:  PHQ-2/PHQ-9 Depression Screening 3/10/2022   Retired Total Score -   Little Interest or Pleasure in Doing Things 0-->not at all   Feeling Down, Depressed or Hopeless 0-->not at all   PHQ-9: Brief Depression Severity Measure Score 0       Health Habits and Functional and Cognitive Screening:  Functional & Cognitive Status 3/10/2022   Do you have difficulty preparing food and eating? No   Do you have difficulty bathing yourself, getting dressed or grooming yourself? No   Do you have difficulty using the toilet? No   Do you have difficulty moving around from place to place? No   Do you have trouble with steps or getting out of a bed or a  chair? No   Current Diet Well Balanced Diet   Dental Exam Up to date   Eye Exam Up to date   Exercise (times per week) 5 times per week   Current Exercises Include Walking;Other   Current Exercise Activities Include -   Do you need help using the phone?  No   Are you deaf or do you have serious difficulty hearing?  No   Do you need help with transportation? No   Do you need help shopping? No   Do you need help preparing meals?  No   Do you need help with housework?  No   Do you need help with laundry? No   Do you need help taking your medications? No   Do you need help managing money? No   Do you ever drive or ride in a car without wearing a seat belt? No   Have you felt unusual stress, anger or loneliness in the last month? No   Who do you live with? Spouse   If you need help, do you have trouble finding someone available to you? No   Have you been bothered in the last four weeks by sexual problems? No   Do you have difficulty concentrating, remembering or making decisions? No       Age-appropriate Screening Schedule:  Refer to the list below for future screening recommendations based on patient's age, sex and/or medical conditions. Orders for these recommended tests are listed in the plan section. The patient has been provided with a written plan.    Health Maintenance   Topic Date Due   • DXA SCAN  10/26/2020   • LIPID PANEL  03/01/2023   • MAMMOGRAM  11/05/2023   • TDAP/TD VACCINES (3 - Td or Tdap) 01/23/2025   • INFLUENZA VACCINE  Completed   • ZOSTER VACCINE  Completed              Assessment/Plan   CMS Preventative Services Quick Reference  Risk Factors Identified During Encounter  Fall Risk-High or Moderate  The above risks/problems have been discussed with the patient.  Follow up actions/plans if indicated are seen below in the Assessment/Plan Section.  Pertinent information has been shared with the patient in the After Visit Summary.    Diagnoses and all orders for this visit:    1. Medicare annual  wellness visit, subsequent (Primary)    2. Routine general medical examination at a health care facility    3. Benign essential hypertension    4. Mixed hyperlipidemia  -     rosuvastatin (Crestor) 10 MG tablet; Take 1 tablet by mouth Every Night.  Dispense: 90 tablet; Refill: 2  -     CBC & Differential  -     Comprehensive Metabolic Panel  -     Lipid Panel      Plan:  1.physical: Physical exam conducted today, reviewed fasting lab work,   Impression: healthy adult Patient. Currently, patient eats a healthy diet. Screening lab work includes glucose, lipid profile and complete blood count . The risks and benefits of immunizations were discussed and immunizations are up to date. Advice and education were given regarding nutrition and aerobic exercise. Patient discussion: discussed with the patient.  Annual Wellness Visit  with preventive exam as well as age and risk appropriate counseling completed.   Advance Directive Planning: paperwork and instructions were given to the patient.   Patient Discussion: plan discussed with the patient, follow-up visit needed in one year. Medication Review and medication list updated  2.  Benign essential hypertension: Will continue current medication, low-sodium diet advised, Counseled to regularly check BP at home with goal averaging <130/80.   3.mixed hyperlipidemia:  reviewed  fasting CMP and lipid panel.  Diet and exercise counseled,  Will continue current medications         Follow Up:   Return in about 1 year (around 3/10/2023) for Medicare Wellness.     An After Visit Summary and PPPS were made available to the patient.

## 2022-03-10 NOTE — PATIENT INSTRUCTIONS
Medicare Wellness  Personal Prevention Plan of Service     Date of Office Visit:    Encounter Provider:  Shona Horn MD  Place of Service:  [facility]  Patient Name: Sahara Hardwick  :  1950    As part of the Medicare Wellness portion of your visit today, we are providing you with this personalized preventive plan of services (PPPS). This plan is based upon recommendations of the [place] Preventive Services Task Force (USPSTF) and the Advisory Committee on Immunization Practices (ACIP).    This lists the preventive care services that should be considered, and provides dates of when you are due. Items listed as completed are up-to-date and do not require any further intervention.    Health Maintenance   Topic Date Due   • HEPATITIS C SCREENING  Never done   • DXA SCAN  10/26/2020   • ANNUAL WELLNESS VISIT  2022   • COVID-19 Vaccine (4 - Booster for Moderna series) 2022   • LIPID PANEL  2023   • MAMMOGRAM  2023   • TDAP/TD VACCINES (3 - Td or Tdap) 2025   • COLORECTAL CANCER SCREENING  2029   • INFLUENZA VACCINE  Completed   • Pneumococcal Vaccine 65+  Completed   • ZOSTER VACCINE  Completed       No orders of the defined types were placed in this encounter.      No follow-ups on file.

## 2022-06-08 DIAGNOSIS — I10 BENIGN ESSENTIAL HYPERTENSION: ICD-10-CM

## 2022-06-09 RX ORDER — LISINOPRIL AND HYDROCHLOROTHIAZIDE 20; 12.5 MG/1; MG/1
TABLET ORAL
Qty: 45 TABLET | Refills: 2 | Status: SHIPPED | OUTPATIENT
Start: 2022-06-09 | End: 2022-07-19 | Stop reason: SDUPTHER

## 2022-06-09 NOTE — TELEPHONE ENCOUNTER
Rx Refill Note  Requested Prescriptions     Pending Prescriptions Disp Refills   • lisinopril-hydrochlorothiazide (PRINZIDE,ZESTORETIC) 20-12.5 MG per tablet [Pharmacy Med Name: LISINOPRIL-HCTZ 20-12.5 MG TAB] 45 tablet 2     Sig: TAKE 1/2 TABLET BY MOUTH DAILY      Last office visit with prescribing clinician: 3/10/2022      Next office visit with prescribing clinician: 7/19/2022            Delfino aPrk, RE  06/09/22, 09:44 EDT

## 2022-07-11 LAB
ALBUMIN SERPL-MCNC: 4.3 G/DL (ref 3.5–5.2)
ALBUMIN/GLOB SERPL: 2 G/DL
ALP SERPL-CCNC: 63 U/L (ref 39–117)
ALT SERPL-CCNC: 24 U/L (ref 1–33)
AST SERPL-CCNC: 25 U/L (ref 1–32)
BASOPHILS # BLD AUTO: 0.03 10*3/MM3 (ref 0–0.2)
BASOPHILS NFR BLD AUTO: 0.4 % (ref 0–1.5)
BILIRUB SERPL-MCNC: 0.4 MG/DL (ref 0–1.2)
BUN SERPL-MCNC: 11 MG/DL (ref 8–23)
BUN/CREAT SERPL: 17.2 (ref 7–25)
CALCIUM SERPL-MCNC: 9.5 MG/DL (ref 8.6–10.5)
CHLORIDE SERPL-SCNC: 102 MMOL/L (ref 98–107)
CHOLEST SERPL-MCNC: 175 MG/DL (ref 0–200)
CO2 SERPL-SCNC: 28.7 MMOL/L (ref 22–29)
CREAT SERPL-MCNC: 0.64 MG/DL (ref 0.57–1)
EGFRCR SERPLBLD CKD-EPI 2021: 95.2 ML/MIN/1.73
EOSINOPHIL # BLD AUTO: 0.13 10*3/MM3 (ref 0–0.4)
EOSINOPHIL NFR BLD AUTO: 1.9 % (ref 0.3–6.2)
ERYTHROCYTE [DISTWIDTH] IN BLOOD BY AUTOMATED COUNT: 13.4 % (ref 12.3–15.4)
GLOBULIN SER CALC-MCNC: 2.2 GM/DL
GLUCOSE SERPL-MCNC: 99 MG/DL (ref 65–99)
HCT VFR BLD AUTO: 43.9 % (ref 34–46.6)
HDLC SERPL-MCNC: 51 MG/DL (ref 40–60)
HGB BLD-MCNC: 14.1 G/DL (ref 12–15.9)
IMM GRANULOCYTES # BLD AUTO: 0.02 10*3/MM3 (ref 0–0.05)
IMM GRANULOCYTES NFR BLD AUTO: 0.3 % (ref 0–0.5)
LDLC SERPL CALC-MCNC: 94 MG/DL (ref 0–100)
LYMPHOCYTES # BLD AUTO: 2.29 10*3/MM3 (ref 0.7–3.1)
LYMPHOCYTES NFR BLD AUTO: 34.1 % (ref 19.6–45.3)
MCH RBC QN AUTO: 28.3 PG (ref 26.6–33)
MCHC RBC AUTO-ENTMCNC: 32.1 G/DL (ref 31.5–35.7)
MCV RBC AUTO: 88 FL (ref 79–97)
MONOCYTES # BLD AUTO: 0.63 10*3/MM3 (ref 0.1–0.9)
MONOCYTES NFR BLD AUTO: 9.4 % (ref 5–12)
NEUTROPHILS # BLD AUTO: 3.62 10*3/MM3 (ref 1.7–7)
NEUTROPHILS NFR BLD AUTO: 53.9 % (ref 42.7–76)
NRBC BLD AUTO-RTO: 0 /100 WBC (ref 0–0.2)
PLATELET # BLD AUTO: 362 10*3/MM3 (ref 140–450)
POTASSIUM SERPL-SCNC: 4.6 MMOL/L (ref 3.5–5.2)
PROT SERPL-MCNC: 6.5 G/DL (ref 6–8.5)
RBC # BLD AUTO: 4.99 10*6/MM3 (ref 3.77–5.28)
SODIUM SERPL-SCNC: 142 MMOL/L (ref 136–145)
TRIGL SERPL-MCNC: 177 MG/DL (ref 0–150)
VLDLC SERPL CALC-MCNC: 30 MG/DL (ref 5–40)
WBC # BLD AUTO: 6.72 10*3/MM3 (ref 3.4–10.8)

## 2022-07-19 ENCOUNTER — OFFICE VISIT (OUTPATIENT)
Dept: INTERNAL MEDICINE | Facility: CLINIC | Age: 72
End: 2022-07-19

## 2022-07-19 VITALS
TEMPERATURE: 97.7 F | SYSTOLIC BLOOD PRESSURE: 134 MMHG | OXYGEN SATURATION: 98 % | HEART RATE: 81 BPM | WEIGHT: 143 LBS | BODY MASS INDEX: 23.82 KG/M2 | DIASTOLIC BLOOD PRESSURE: 78 MMHG | HEIGHT: 65 IN | RESPIRATION RATE: 14 BRPM

## 2022-07-19 DIAGNOSIS — E78.2 MIXED HYPERLIPIDEMIA: ICD-10-CM

## 2022-07-19 DIAGNOSIS — R73.01 IMPAIRED FASTING GLUCOSE: ICD-10-CM

## 2022-07-19 DIAGNOSIS — I10 BENIGN ESSENTIAL HYPERTENSION: Primary | ICD-10-CM

## 2022-07-19 PROCEDURE — 99214 OFFICE O/P EST MOD 30 MIN: CPT | Performed by: INTERNAL MEDICINE

## 2022-07-19 RX ORDER — ROSUVASTATIN CALCIUM 10 MG/1
10 TABLET, COATED ORAL NIGHTLY
Qty: 90 TABLET | Refills: 2 | Status: SHIPPED | OUTPATIENT
Start: 2022-07-19 | End: 2022-11-09 | Stop reason: SDUPTHER

## 2022-07-19 RX ORDER — LISINOPRIL AND HYDROCHLOROTHIAZIDE 20; 12.5 MG/1; MG/1
0.5 TABLET ORAL DAILY
Qty: 45 TABLET | Refills: 2 | Status: SHIPPED | OUTPATIENT
Start: 2022-07-19 | End: 2022-11-09 | Stop reason: SDUPTHER

## 2022-07-19 NOTE — PROGRESS NOTES
"Chief Complaint  Hypertension (Medication follow up) and Hyperlipidemia    Subjective        Sahara Hardwick presents to [facility]  History of Present Illness  HPI: Patient is here to follow up on the blood pressure  The patient is taking the blood pressure medications as prescribed and has had no side effects.  Patient is brought in a home blood pressure readings which have been reviewed today and scanned into the chart, the patient is also here to follow up on the cholesterol and is trying to follow a diet.  She was put on Crestor at the last visit which has been able to tolerate, the patient  Had  lab work done .  The patient also needs refills on medications .   Hyperlipidemia   Pertinent negatives include no chest pain or shortness of breath.   Hypertension   Pertinent negatives include no chest pain, palpitations or shortness of breath.  Answers for HPI/ROS submitted by the patient on 7/17/2022  What is the primary reason for your visit?: High Blood Pressure      Objective   Vital Signs:  /78   Pulse 81   Temp 97.7 °F (36.5 °C)   Resp 14   Ht 165.1 cm (65\")   Wt 64.9 kg (143 lb)   SpO2 98%   BMI 23.80 kg/m²   Estimated body mass index is 23.8 kg/m² as calculated from the following:    Height as of this encounter: 165.1 cm (65\").    Weight as of this encounter: 64.9 kg (143 lb).    BMI is within normal parameters. No other follow-up for BMI required.      Physical Exam  Vitals and nursing note reviewed.   Constitutional:       General: She is not in acute distress.     Appearance: Normal appearance. She is not diaphoretic.   HENT:      Head: Normocephalic and atraumatic.      Right Ear: External ear normal.      Left Ear: External ear normal.      Nose: Nose normal.   Eyes:      Extraocular Movements: Extraocular movements intact.      Conjunctiva/sclera: Conjunctivae normal.   Neck:      Trachea: Trachea normal.   Cardiovascular:      Rate and Rhythm: Normal rate and " regular rhythm.      Heart sounds: Normal heart sounds.   Pulmonary:      Effort: Pulmonary effort is normal. No respiratory distress.      Breath sounds: Normal breath sounds.   Abdominal:      General: Abdomen is flat.   Musculoskeletal:      Cervical back: Neck supple.      Comments: Moves all limbs   Skin:     General: Skin is warm.   Neurological:      Mental Status: She is alert and oriented to person, place, and time.      Comments: No gross motor or sensory deficits        Result Review :    Common labs    Common Labsle 10/12/21 10/12/21 10/12/21 10/12/21 3/1/22 3/1/22 3/1/22 3/1/22 7/11/22 7/11/22 7/11/22    0833 0833 0833 0833 0831 0831 0831 0831 0823 0823 0823   Glucose  93    98    99    BUN  11    13    11    Creatinine  0.71    0.74    0.64    eGFR Non African Am  81            eGFR  Am  99            Sodium  144    142    142    Potassium  4.6    4.4    4.6    Chloride  103    102    102    Calcium  9.6    10.0    9.5    Total Protein  6.2    6.6    6.5    Albumin  4.60    4.30    4.30    Total Bilirubin  0.6    0.5    0.4    Alkaline Phosphatase  67    72    63    AST (SGOT)  22    21    25    ALT (SGPT)  16    19    24    WBC 6.44    8.01    6.72     Hemoglobin 14.1    14.1    14.1     Hematocrit 45.5    43.9    43.9     Platelets 361    386    362     Total Cholesterol   188    213 (A)    175   Triglycerides   169 (A)    194 (A)    177 (A)   HDL Cholesterol   47    45    51   LDL Cholesterol    111 (A)    133 (A)    94   Hemoglobin A1C    5.70 (A)    5.70 (A)      (A) Abnormal value       Comments are available for some flowsheets but are not being displayed.                     Assessment and Plan   Diagnoses and all orders for this visit:    1. Benign essential hypertension (Primary)  -     lisinopril-hydrochlorothiazide (PRINZIDE,ZESTORETIC) 20-12.5 MG per tablet; Take 0.5 tablets by mouth Daily.  Dispense: 45 tablet; Refill: 2    2. Mixed hyperlipidemia  -     CBC & Differential  -      Comprehensive Metabolic Panel  -     Lipid Panel  -     rosuvastatin (Crestor) 10 MG tablet; Take 1 tablet by mouth Every Night.  Dispense: 90 tablet; Refill: 2    3. Impaired fasting glucose  -     Hemoglobin A1c    Plan:  1.  Benign essential hypertension: Will continue current medication, low-sodium diet advised, Counseled to regularly check BP at home with goal averaging <130/80.   2.mixed hyperlipidemia:  reviewed  fasting CMP and lipid panel.  Diet and exercise counseled,  Will continue current medications - crestor , ldl 94   3. impaired glucose   :   reviewed  fasting CMP  and hba1c 5.7   , diet and exercise counseled     I spent  30  minutes caring for  Sahara  on this date of service. This time includes time spent by me in the following activities:preparing for the visit, reviewing tests, performing a medically appropriate examination and/or evaluation , counseling and educating the patient/family/caregiver, ordering medications, tests, or procedures and documenting information in the medical record           Follow Up   Return in about 4 months (around 11/14/2022).  Patient was given instructions and counseling regarding her condition or for health maintenance advice. Please see specific information pulled into the AVS if appropriate.

## 2022-11-03 LAB
ALBUMIN SERPL-MCNC: 4.6 G/DL (ref 3.5–5.2)
ALBUMIN/GLOB SERPL: 2.6 G/DL
ALP SERPL-CCNC: 60 U/L (ref 39–117)
ALT SERPL-CCNC: 16 U/L (ref 1–33)
AST SERPL-CCNC: 23 U/L (ref 1–32)
BASOPHILS # BLD AUTO: 0.04 10*3/MM3 (ref 0–0.2)
BASOPHILS NFR BLD AUTO: 0.6 % (ref 0–1.5)
BILIRUB SERPL-MCNC: 0.4 MG/DL (ref 0–1.2)
BUN SERPL-MCNC: 13 MG/DL (ref 8–23)
BUN/CREAT SERPL: 18.3 (ref 7–25)
CALCIUM SERPL-MCNC: 9.7 MG/DL (ref 8.6–10.5)
CHLORIDE SERPL-SCNC: 104 MMOL/L (ref 98–107)
CHOLEST SERPL-MCNC: 179 MG/DL (ref 0–200)
CO2 SERPL-SCNC: 28.5 MMOL/L (ref 22–29)
CREAT SERPL-MCNC: 0.71 MG/DL (ref 0.57–1)
EGFRCR SERPLBLD CKD-EPI 2021: 91 ML/MIN/1.73
EOSINOPHIL # BLD AUTO: 0.16 10*3/MM3 (ref 0–0.4)
EOSINOPHIL NFR BLD AUTO: 2.4 % (ref 0.3–6.2)
ERYTHROCYTE [DISTWIDTH] IN BLOOD BY AUTOMATED COUNT: 12.4 % (ref 12.3–15.4)
GLOBULIN SER CALC-MCNC: 1.8 GM/DL
GLUCOSE SERPL-MCNC: 95 MG/DL (ref 65–99)
HBA1C MFR BLD: 5.7 % (ref 4.8–5.6)
HCT VFR BLD AUTO: 41.5 % (ref 34–46.6)
HDLC SERPL-MCNC: 49 MG/DL (ref 40–60)
HGB BLD-MCNC: 14 G/DL (ref 12–15.9)
IMM GRANULOCYTES # BLD AUTO: 0.01 10*3/MM3 (ref 0–0.05)
IMM GRANULOCYTES NFR BLD AUTO: 0.1 % (ref 0–0.5)
LDLC SERPL CALC-MCNC: 95 MG/DL (ref 0–100)
LYMPHOCYTES # BLD AUTO: 2.58 10*3/MM3 (ref 0.7–3.1)
LYMPHOCYTES NFR BLD AUTO: 38.2 % (ref 19.6–45.3)
MCH RBC QN AUTO: 29.2 PG (ref 26.6–33)
MCHC RBC AUTO-ENTMCNC: 33.7 G/DL (ref 31.5–35.7)
MCV RBC AUTO: 86.5 FL (ref 79–97)
MONOCYTES # BLD AUTO: 0.69 10*3/MM3 (ref 0.1–0.9)
MONOCYTES NFR BLD AUTO: 10.2 % (ref 5–12)
NEUTROPHILS # BLD AUTO: 3.28 10*3/MM3 (ref 1.7–7)
NEUTROPHILS NFR BLD AUTO: 48.5 % (ref 42.7–76)
NRBC BLD AUTO-RTO: 0 /100 WBC (ref 0–0.2)
PLATELET # BLD AUTO: 332 10*3/MM3 (ref 140–450)
POTASSIUM SERPL-SCNC: 4.2 MMOL/L (ref 3.5–5.2)
PROT SERPL-MCNC: 6.4 G/DL (ref 6–8.5)
RBC # BLD AUTO: 4.8 10*6/MM3 (ref 3.77–5.28)
SODIUM SERPL-SCNC: 143 MMOL/L (ref 136–145)
TRIGL SERPL-MCNC: 209 MG/DL (ref 0–150)
VLDLC SERPL CALC-MCNC: 35 MG/DL (ref 5–40)
WBC # BLD AUTO: 6.76 10*3/MM3 (ref 3.4–10.8)

## 2022-11-09 ENCOUNTER — OFFICE VISIT (OUTPATIENT)
Dept: INTERNAL MEDICINE | Facility: CLINIC | Age: 72
End: 2022-11-09

## 2022-11-09 VITALS
RESPIRATION RATE: 16 BRPM | DIASTOLIC BLOOD PRESSURE: 82 MMHG | TEMPERATURE: 98.2 F | BODY MASS INDEX: 24.16 KG/M2 | SYSTOLIC BLOOD PRESSURE: 146 MMHG | OXYGEN SATURATION: 98 % | WEIGHT: 145 LBS | HEART RATE: 65 BPM | HEIGHT: 65 IN

## 2022-11-09 DIAGNOSIS — R73.01 IMPAIRED FASTING GLUCOSE: ICD-10-CM

## 2022-11-09 DIAGNOSIS — E78.2 MIXED HYPERLIPIDEMIA: ICD-10-CM

## 2022-11-09 DIAGNOSIS — I10 BENIGN ESSENTIAL HYPERTENSION: Primary | ICD-10-CM

## 2022-11-09 PROCEDURE — 99214 OFFICE O/P EST MOD 30 MIN: CPT | Performed by: INTERNAL MEDICINE

## 2022-11-09 RX ORDER — ROSUVASTATIN CALCIUM 10 MG/1
10 TABLET, COATED ORAL NIGHTLY
Qty: 90 TABLET | Refills: 2 | Status: SHIPPED | OUTPATIENT
Start: 2022-11-09

## 2022-11-09 RX ORDER — LISINOPRIL AND HYDROCHLOROTHIAZIDE 20; 12.5 MG/1; MG/1
0.5 TABLET ORAL DAILY
Qty: 45 TABLET | Refills: 2 | Status: SHIPPED | OUTPATIENT
Start: 2022-11-09

## 2022-11-09 NOTE — PROGRESS NOTES
"Chief Complaint  Hypertension and Hyperlipidemia    Subjective        Sahara Hardwick presents to [facility]  History of Present Illness  HPI: Patient is here to follow up on the blood pressure  The patient is taking the blood pressure medications as prescribed and has had no side effects. The patient is also here to follow up on the cholesterol and is trying to follow a diet. The patient had  lab work done .  The patient also needs refills on medications .   Hyperlipidemia   Pertinent negatives include no chest pain or shortness of breath.   Hypertension   Pertinent negatives include no chest pain, palpitations or shortness of breath.  Answers for HPI/ROS submitted by the patient on 11/2/2022  What is the primary reason for your visit?: High Blood Pressure      Objective   Vital Signs:  /82   Pulse 65   Temp 98.2 °F (36.8 °C)   Resp 16   Ht 165.1 cm (65\")   Wt 65.8 kg (145 lb)   SpO2 98%   BMI 24.13 kg/m²   Estimated body mass index is 24.13 kg/m² as calculated from the following:    Height as of this encounter: 165.1 cm (65\").    Weight as of this encounter: 65.8 kg (145 lb).    BMI is within normal parameters. No other follow-up for BMI required.      Physical Exam  Vitals and nursing note reviewed.   Constitutional:       General: She is not in acute distress.     Appearance: Normal appearance. She is not diaphoretic.   HENT:      Head: Normocephalic and atraumatic.      Right Ear: External ear normal.      Left Ear: External ear normal.      Nose: Nose normal.   Eyes:      Extraocular Movements: Extraocular movements intact.      Conjunctiva/sclera: Conjunctivae normal.   Neck:      Trachea: Trachea normal.   Cardiovascular:      Rate and Rhythm: Normal rate and regular rhythm.      Heart sounds: Normal heart sounds.   Pulmonary:      Effort: Pulmonary effort is normal. No respiratory distress.      Breath sounds: Normal breath sounds.   Abdominal:      General: Abdomen " is flat.   Musculoskeletal:      Cervical back: Neck supple.      Comments: Moves all limbs   Skin:     General: Skin is warm.   Neurological:      Mental Status: She is alert and oriented to person, place, and time.      Comments: No gross motor or sensory deficits        Result Review :    Common labs    Common Labs 3/1/22 3/1/22 3/1/22 3/1/22 7/11/22 7/11/22 7/11/22 11/2/22 11/2/22 11/2/22 11/2/22    0831 0831 0831 0831 0823 0823 0823 0809 0809 0809 0809   Glucose  98    99   95     BUN  13    11   13     Creatinine  0.74    0.64   0.71     Sodium  142    142   143     Potassium  4.4    4.6   4.2     Chloride  102    102   104     Calcium  10.0    9.5   9.7     Total Protein  6.6    6.5   6.4     Albumin  4.30    4.30   4.60     Total Bilirubin  0.5    0.4   0.4     Alkaline Phosphatase  72    63   60     AST (SGOT)  21    25   23     ALT (SGPT)  19    24   16     WBC 8.01    6.72   6.76      Hemoglobin 14.1    14.1   14.0      Hematocrit 43.9    43.9   41.5      Platelets 386    362   332      Total Cholesterol   213 (A)    175   179    Triglycerides   194 (A)    177 (A)   209 (A)    HDL Cholesterol   45    51   49    LDL Cholesterol    133 (A)    94   95    Hemoglobin A1C    5.70 (A)       5.70 (A)   (A) Abnormal value       Comments are available for some flowsheets but are not being displayed.                     Assessment and Plan   Diagnoses and all orders for this visit:    1. Benign essential hypertension (Primary)  -     lisinopril-hydrochlorothiazide (PRINZIDE,ZESTORETIC) 20-12.5 MG per tablet; Take 0.5 tablets by mouth Daily.  Dispense: 45 tablet; Refill: 2    2. Mixed hyperlipidemia  -     CBC & Differential  -     Comprehensive Metabolic Panel  -     Lipid Panel  -     rosuvastatin (Crestor) 10 MG tablet; Take 1 tablet by mouth Every Night.  Dispense: 90 tablet; Refill: 2    3. Impaired fasting glucose  -     Hemoglobin A1c      Plan:  1.  Benign essential hypertension: Will continue current  medication, low-sodium diet advised, Counseled to regularly check BP at home with goal averaging <130/80.   2.mixed hyperlipidemia:  reviewed  fasting CMP and lipid panel.  Diet and exercise counseled,  Will continue current medications  3. impaired glucose   :   reviewed  fasting CMP  and hba1c  5.7  , diet and exercise counseled          I spent 30 minutes caring for Sahara on this date of service. This time includes time spent by me in the following activities:preparing for the visit, reviewing tests, performing a medically appropriate examination and/or evaluation , counseling and educating the patient/family/caregiver, ordering medications, tests, or procedures and documenting information in the medical record  Follow Up {Instructions Charge Capture  Follow-up Communications :23}  Return in about 4 months (around 3/21/2023).  Patient was given instructions and counseling regarding her condition or for health maintenance advice. Please see specific information pulled into the AVS if appropriate.

## 2023-03-16 LAB
ALBUMIN SERPL-MCNC: 4.4 G/DL (ref 3.5–5.2)
ALBUMIN/GLOB SERPL: 1.9 G/DL
ALP SERPL-CCNC: 65 U/L (ref 39–117)
ALT SERPL-CCNC: 17 U/L (ref 1–33)
AST SERPL-CCNC: 25 U/L (ref 1–32)
BASOPHILS # BLD AUTO: 0.04 10*3/MM3 (ref 0–0.2)
BASOPHILS NFR BLD AUTO: 0.5 % (ref 0–1.5)
BILIRUB SERPL-MCNC: 0.5 MG/DL (ref 0–1.2)
BUN SERPL-MCNC: 13 MG/DL (ref 8–23)
BUN/CREAT SERPL: 15.9 (ref 7–25)
CALCIUM SERPL-MCNC: 10.1 MG/DL (ref 8.6–10.5)
CHLORIDE SERPL-SCNC: 103 MMOL/L (ref 98–107)
CHOLEST SERPL-MCNC: 171 MG/DL (ref 0–200)
CO2 SERPL-SCNC: 28.2 MMOL/L (ref 22–29)
CREAT SERPL-MCNC: 0.82 MG/DL (ref 0.57–1)
EGFRCR SERPLBLD CKD-EPI 2021: 76.6 ML/MIN/1.73
EOSINOPHIL # BLD AUTO: 0.2 10*3/MM3 (ref 0–0.4)
EOSINOPHIL NFR BLD AUTO: 2.4 % (ref 0.3–6.2)
ERYTHROCYTE [DISTWIDTH] IN BLOOD BY AUTOMATED COUNT: 12.8 % (ref 12.3–15.4)
GLOBULIN SER CALC-MCNC: 2.3 GM/DL
GLUCOSE SERPL-MCNC: 100 MG/DL (ref 65–99)
HBA1C MFR BLD: 5.7 % (ref 4.8–5.6)
HCT VFR BLD AUTO: 42.7 % (ref 34–46.6)
HDLC SERPL-MCNC: 48 MG/DL (ref 40–60)
HGB BLD-MCNC: 14.2 G/DL (ref 12–15.9)
IMM GRANULOCYTES # BLD AUTO: 0.01 10*3/MM3 (ref 0–0.05)
IMM GRANULOCYTES NFR BLD AUTO: 0.1 % (ref 0–0.5)
LDLC SERPL CALC-MCNC: 91 MG/DL (ref 0–100)
LYMPHOCYTES # BLD AUTO: 3.24 10*3/MM3 (ref 0.7–3.1)
LYMPHOCYTES NFR BLD AUTO: 39.6 % (ref 19.6–45.3)
MCH RBC QN AUTO: 29.3 PG (ref 26.6–33)
MCHC RBC AUTO-ENTMCNC: 33.3 G/DL (ref 31.5–35.7)
MCV RBC AUTO: 88.2 FL (ref 79–97)
MONOCYTES # BLD AUTO: 0.86 10*3/MM3 (ref 0.1–0.9)
MONOCYTES NFR BLD AUTO: 10.5 % (ref 5–12)
NEUTROPHILS # BLD AUTO: 3.84 10*3/MM3 (ref 1.7–7)
NEUTROPHILS NFR BLD AUTO: 46.9 % (ref 42.7–76)
NRBC BLD AUTO-RTO: 0 /100 WBC (ref 0–0.2)
PLATELET # BLD AUTO: 379 10*3/MM3 (ref 140–450)
POTASSIUM SERPL-SCNC: 4.5 MMOL/L (ref 3.5–5.2)
PROT SERPL-MCNC: 6.7 G/DL (ref 6–8.5)
RBC # BLD AUTO: 4.84 10*6/MM3 (ref 3.77–5.28)
SODIUM SERPL-SCNC: 142 MMOL/L (ref 136–145)
TRIGL SERPL-MCNC: 188 MG/DL (ref 0–150)
VLDLC SERPL CALC-MCNC: 32 MG/DL (ref 5–40)
WBC # BLD AUTO: 8.19 10*3/MM3 (ref 3.4–10.8)

## 2023-03-21 ENCOUNTER — OFFICE VISIT (OUTPATIENT)
Dept: INTERNAL MEDICINE | Facility: CLINIC | Age: 73
End: 2023-03-21
Payer: MEDICARE

## 2023-03-21 VITALS
SYSTOLIC BLOOD PRESSURE: 149 MMHG | DIASTOLIC BLOOD PRESSURE: 82 MMHG | OXYGEN SATURATION: 100 % | BODY MASS INDEX: 23.82 KG/M2 | RESPIRATION RATE: 16 BRPM | WEIGHT: 143 LBS | TEMPERATURE: 97.5 F | HEART RATE: 65 BPM | HEIGHT: 65 IN

## 2023-03-21 DIAGNOSIS — Z00.00 MEDICARE ANNUAL WELLNESS VISIT, SUBSEQUENT: Primary | ICD-10-CM

## 2023-03-21 DIAGNOSIS — R73.01 IMPAIRED FASTING GLUCOSE: ICD-10-CM

## 2023-03-21 DIAGNOSIS — E78.2 MIXED HYPERLIPIDEMIA: ICD-10-CM

## 2023-03-21 DIAGNOSIS — Z13.820 ENCOUNTER FOR SCREENING FOR OSTEOPOROSIS: ICD-10-CM

## 2023-03-21 DIAGNOSIS — I10 BENIGN ESSENTIAL HYPERTENSION: ICD-10-CM

## 2023-03-21 DIAGNOSIS — Z78.0 MENOPAUSE: ICD-10-CM

## 2023-03-21 PROCEDURE — G0439 PPPS, SUBSEQ VISIT: HCPCS | Performed by: INTERNAL MEDICINE

## 2023-03-21 PROCEDURE — 3079F DIAST BP 80-89 MM HG: CPT | Performed by: INTERNAL MEDICINE

## 2023-03-21 PROCEDURE — 1159F MED LIST DOCD IN RCRD: CPT | Performed by: INTERNAL MEDICINE

## 2023-03-21 PROCEDURE — 1160F RVW MEDS BY RX/DR IN RCRD: CPT | Performed by: INTERNAL MEDICINE

## 2023-03-21 PROCEDURE — 3077F SYST BP >= 140 MM HG: CPT | Performed by: INTERNAL MEDICINE

## 2023-03-21 NOTE — PATIENT INSTRUCTIONS
Medicare Wellness  Personal Prevention Plan of Service     Date of Office Visit:    Encounter Provider:  Shona Horn MD  Place of Service:  [facility]  Patient Name: Sahara Hardwick  :  1950    As part of the Medicare Wellness portion of your visit today, we are providing you with this personalized preventive plan of services (PPPS). This plan is based upon recommendations of the [place] Preventive Services Task Force (USPSTF) and the Advisory Committee on Immunization Practices (ACIP).    This lists the preventive care services that should be considered, and provides dates of when you are due. Items listed as completed are up-to-date and do not require any further intervention.    Health Maintenance   Topic Date Due   • HEPATITIS C SCREENING  Never done   • DXA SCAN  10/26/2020   • LIPID PANEL  03/15/2024   • ANNUAL WELLNESS VISIT  2024   • MAMMOGRAM  2024   • TDAP/TD VACCINES (3 - Td or Tdap) 2025   • COLORECTAL CANCER SCREENING  2029   • COVID-19 Vaccine  Completed   • INFLUENZA VACCINE  Completed   • Pneumococcal Vaccine 65+  Completed   • ZOSTER VACCINE  Completed       No orders of the defined types were placed in this encounter.      Return in about 1 year (around 3/21/2024) for Medicare Wellness.

## 2023-03-21 NOTE — PROGRESS NOTES
The ABCs of the Annual Wellness Visit  Subsequent Medicare Wellness Visit    Chief Complaint   Patient presents with   • Medicare Wellness-subsequent   • Hypertension   • Hyperlipidemia       Subjective      Sahara Hardwick is a 72 y.o. female who presents for a Subsequent Medicare Wellness Visit.   Patient is here to follow up on the blood pressure  The patient is taking the blood pressure medications as prescribed and has had no side effects. The patient is also here to follow up on the cholesterol and is trying to follow a diet. The patient is   Had  lab work done .  The patient also needs DEXA scan.   Hyperlipidemia   Pertinent negatives include no chest pain or shortness of breath.   Hypertension   Pertinent negatives include no chest pain, palpitations or shortness of breath.      The following portions of the patient's history were reviewed and   updated as appropriate: allergies, current medications, past family history, past medical history, past social history, past surgical history and problem list.    Compared to one year ago, the patient feels her physical   health is the same.    Compared to one year ago, the patient feels her mental   health is the same.    Recent Hospitalizations:  She was not admitted to the hospital during the last year.       Current Medical Providers:  Patient Care Team:  Shona Horn MD as PCP - General (Internal Medicine)    Outpatient Medications Prior to Visit   Medication Sig Dispense Refill   • acetaminophen (TYLENOL) 650 MG 8 hr tablet Take 1 tablet by mouth Every 8 (Eight) Hours As Needed for Mild Pain.     • cholecalciferol (VITAMIN D3) 25 MCG (1000 UT) tablet Take 2 tablets by mouth Daily.     • lisinopril-hydrochlorothiazide (PRINZIDE,ZESTORETIC) 20-12.5 MG per tablet Take 0.5 tablets by mouth Daily. 45 tablet 2   • Multiple Vitamins-Minerals (MULTIVITAMIN ADULT PO) Take 1 tablet by mouth Daily.     • rosuvastatin (Crestor) 10 MG tablet Take 1 tablet by mouth Every  "Night. 90 tablet 2   • tacrolimus (PROGRAF) 1 MG capsule Apply 1 capsule to the mouth or throat Take As Directed. Empty contents of 1 capsule into 1 L distilled water; Once dissolved, swish and spit up to 4 times a day.     • aspirin 81 MG EC tablet Take 81 mg by mouth Daily.       No facility-administered medications prior to visit.       No opioid medication identified on active medication list. I have reviewed chart for other potential  high risk medication/s and harmful drug interactions in the elderly.          Aspirin is not on active medication list.  Aspirin use is not indicated based on review of current medical condition/s. Risk of harm outweighs potential benefits.  .    Patient Active Problem List   Diagnosis   • Primary osteoarthritis of right knee   • Benign essential hypertension   • Mixed hyperlipidemia     Advance Care Planning  Advance Directive is on file.  ACP discussion was held with the patient during this visit. Patient has an advance directive in EMR which is still valid.      Objective    Vitals:    03/21/23 1509   BP: 149/82   Pulse: 65   Resp: 16   Temp: 97.5 °F (36.4 °C)   SpO2: 100%   Weight: 64.9 kg (143 lb)   Height: 165.1 cm (65\")   PainSc: 0-No pain     Estimated body mass index is 23.8 kg/m² as calculated from the following:    Height as of this encounter: 165.1 cm (65\").    Weight as of this encounter: 64.9 kg (143 lb).    BMI is within normal parameters. No other follow-up for BMI required.       Physical Examination  Vitals and nursing note reviewed  General appearance: Normocephalic and nontraumatic  HEENT: External inspection of eyes, ears and nose appears benign, hearing appears intact  Neck: Neck appears supple, trachea in midline, thyroid appears not enlarged  Respiratory: Respiratory effort appears normal  Musculoskeletal: Moving all limbs  Range of motion of visible joints appears within normal  CNS: No gross motor or sensory deficits  No gross cranial nerve deficits  No " tremors  Psychiatry: Alert and oriented x3  Memory appears intact  Mood and affect appears normal  Insight appears normal      Does the patient have evidence of cognitive impairment?   No    Lab Results   Component Value Date    CHLPL 171 03/15/2023    TRIG 188 (H) 03/15/2023    HDL 48 03/15/2023    LDL 91 03/15/2023    VLDL 32 03/15/2023    HGBA1C 5.70 (H) 03/15/2023          HEALTH RISK ASSESSMENT    Smoking Status:  Social History     Tobacco Use   Smoking Status Never   Smokeless Tobacco Never     Alcohol Consumption:  Social History     Substance and Sexual Activity   Alcohol Use Never     Fall Risk Screen:    STEADI Fall Risk Assessment was completed, and patient is at LOW risk for falls.Assessment completed on:3/21/2023    Depression Screening:  PHQ-2/PHQ-9 Depression Screening 3/21/2023   Little Interest or Pleasure in Doing Things 0-->not at all   Feeling Down, Depressed or Hopeless 0-->not at all   PHQ-9: Brief Depression Severity Measure Score 0       Health Habits and Functional and Cognitive Screening:  Functional & Cognitive Status 3/21/2023   Do you have difficulty preparing food and eating? No   Do you have difficulty bathing yourself, getting dressed or grooming yourself? No   Do you have difficulty using the toilet? No   Do you have difficulty moving around from place to place? No   Do you have trouble with steps or getting out of a bed or a chair? No   Current Diet Well Balanced Diet   Dental Exam Up to date   Eye Exam Up to date   Exercise (times per week) 5 times per week   Current Exercises Include House Cleaning;Walking   Current Exercise Activities Include -   Do you need help using the phone?  No   Are you deaf or do you have serious difficulty hearing?  No   Do you need help with transportation? No   Do you need help shopping? No   Do you need help preparing meals?  No   Do you need help with housework?  No   Do you need help with laundry? No   Do you need help taking your medications? No    Do you need help managing money? No   Do you ever drive or ride in a car without wearing a seat belt? No   Have you felt unusual stress, anger or loneliness in the last month? No   Who do you live with? Spouse   If you need help, do you have trouble finding someone available to you? No   Have you been bothered in the last four weeks by sexual problems? No   Do you have difficulty concentrating, remembering or making decisions? No       Age-appropriate Screening Schedule:  Refer to the list below for future screening recommendations based on patient's age, sex and/or medical conditions. Orders for these recommended tests are listed in the plan section. The patient has been provided with a written plan.    Health Maintenance   Topic Date Due   • HEPATITIS C SCREENING  Never done   • DXA SCAN  10/26/2020   • LIPID PANEL  03/15/2024   • ANNUAL WELLNESS VISIT  03/21/2024   • MAMMOGRAM  11/07/2024   • TDAP/TD VACCINES (3 - Td or Tdap) 01/23/2025   • COLORECTAL CANCER SCREENING  05/16/2029   • COVID-19 Vaccine  Completed   • INFLUENZA VACCINE  Completed   • Pneumococcal Vaccine 65+  Completed   • ZOSTER VACCINE  Completed                CMS Preventative Services Quick Reference  Risk Factors Identified During Encounter:    None Identified    The above risks/problems have been discussed with the patient.  Pertinent information has been shared with the patient in the After Visit Summary.    Diagnoses and all orders for this visit:    1. Medicare annual wellness visit, subsequent (Primary)    2. Encounter for screening for osteoporosis  -     DEXA Bone Density Axial    3. Menopause  -     DEXA Bone Density Axial    4. Benign essential hypertension    5. Mixed hyperlipidemia  -     CBC & Differential  -     Comprehensive Metabolic Panel  -     Lipid Panel    6. Impaired fasting glucose  -     Hemoglobin A1c        Plan:  1.physical: Physical exam conducted today, reviewed fasting lab work,   Impression: healthy adult  Patient. Currently, patient eats a healthy diet. Screening lab work includes glucose, lipid profile and complete blood count . The risks and benefits of immunizations were discussed and immunizations are up to date. Advice and education were given regarding nutrition and aerobic exercise. Patient discussion: discussed with the patient.  Annual Wellness Visit  with preventive exam as well as age and risk appropriate counseling completed.   Advance Directive Planning: paperwork and instructions were given to the patient.   Patient Discussion: plan discussed with the patient, follow-up visit needed in one year. Medication Review and medication list updated  2.  Benign essential hypertension: Will continue current medication, low-sodium diet advised, Counseled to regularly check BP at home with goal averaging <130/80.   3.mixed hyperlipidemia:  reviewed  fasting CMP and lipid panel.  Diet and exercise counseled,  Will continue current medications  4 . impaired glucose   :   reviewed  fasting CMP  and hba1c  , diet and exercise counseled   5.  Screening for osteoporosis: We will obtain DEXA scan    Follow Up:   Next Medicare Wellness visit to be scheduled in 1 year.      An After Visit Summary and PPPS were made available to the patient.

## 2023-05-15 ENCOUNTER — APPOINTMENT (OUTPATIENT)
Dept: BONE DENSITY | Facility: HOSPITAL | Age: 73
End: 2023-05-15
Payer: MEDICARE

## 2023-05-15 PROCEDURE — 77080 DXA BONE DENSITY AXIAL: CPT

## 2023-07-05 LAB
ALBUMIN SERPL-MCNC: 4.6 G/DL (ref 3.5–5.2)
ALBUMIN/GLOB SERPL: 2.6 G/DL
ALP SERPL-CCNC: 63 U/L (ref 39–117)
ALT SERPL-CCNC: 14 U/L (ref 1–33)
AST SERPL-CCNC: 18 U/L (ref 1–32)
BASOPHILS # BLD AUTO: 0.02 10*3/MM3 (ref 0–0.2)
BASOPHILS NFR BLD AUTO: 0.3 % (ref 0–1.5)
BILIRUB SERPL-MCNC: 0.5 MG/DL (ref 0–1.2)
BUN SERPL-MCNC: 14 MG/DL (ref 8–23)
BUN/CREAT SERPL: 20 (ref 7–25)
CALCIUM SERPL-MCNC: 10 MG/DL (ref 8.6–10.5)
CHLORIDE SERPL-SCNC: 103 MMOL/L (ref 98–107)
CHOLEST SERPL-MCNC: 162 MG/DL (ref 0–200)
CO2 SERPL-SCNC: 27.2 MMOL/L (ref 22–29)
CREAT SERPL-MCNC: 0.7 MG/DL (ref 0.57–1)
EGFRCR SERPLBLD CKD-EPI 2021: 92.6 ML/MIN/1.73
EOSINOPHIL # BLD AUTO: 0.14 10*3/MM3 (ref 0–0.4)
EOSINOPHIL NFR BLD AUTO: 2 % (ref 0.3–6.2)
ERYTHROCYTE [DISTWIDTH] IN BLOOD BY AUTOMATED COUNT: 13.2 % (ref 12.3–15.4)
GLOBULIN SER CALC-MCNC: 1.8 GM/DL
GLUCOSE SERPL-MCNC: 98 MG/DL (ref 65–99)
HBA1C MFR BLD: 5.7 % (ref 4.8–5.6)
HCT VFR BLD AUTO: 43 % (ref 34–46.6)
HDLC SERPL-MCNC: 51 MG/DL (ref 40–60)
HGB BLD-MCNC: 13.9 G/DL (ref 12–15.9)
IMM GRANULOCYTES # BLD AUTO: 0.01 10*3/MM3 (ref 0–0.05)
IMM GRANULOCYTES NFR BLD AUTO: 0.1 % (ref 0–0.5)
LDLC SERPL CALC-MCNC: 84 MG/DL (ref 0–100)
LYMPHOCYTES # BLD AUTO: 3 10*3/MM3 (ref 0.7–3.1)
LYMPHOCYTES NFR BLD AUTO: 42.1 % (ref 19.6–45.3)
MCH RBC QN AUTO: 29.1 PG (ref 26.6–33)
MCHC RBC AUTO-ENTMCNC: 32.3 G/DL (ref 31.5–35.7)
MCV RBC AUTO: 90 FL (ref 79–97)
MONOCYTES # BLD AUTO: 0.74 10*3/MM3 (ref 0.1–0.9)
MONOCYTES NFR BLD AUTO: 10.4 % (ref 5–12)
NEUTROPHILS # BLD AUTO: 3.21 10*3/MM3 (ref 1.7–7)
NEUTROPHILS NFR BLD AUTO: 45.1 % (ref 42.7–76)
NRBC BLD AUTO-RTO: 0 /100 WBC (ref 0–0.2)
PLATELET # BLD AUTO: 376 10*3/MM3 (ref 140–450)
POTASSIUM SERPL-SCNC: 4.3 MMOL/L (ref 3.5–5.2)
PROT SERPL-MCNC: 6.4 G/DL (ref 6–8.5)
RBC # BLD AUTO: 4.78 10*6/MM3 (ref 3.77–5.28)
SODIUM SERPL-SCNC: 144 MMOL/L (ref 136–145)
TRIGL SERPL-MCNC: 157 MG/DL (ref 0–150)
VLDLC SERPL CALC-MCNC: 27 MG/DL (ref 5–40)
WBC # BLD AUTO: 7.12 10*3/MM3 (ref 3.4–10.8)

## 2023-09-26 ENCOUNTER — OFFICE VISIT (OUTPATIENT)
Dept: ORTHOPEDIC SURGERY | Facility: CLINIC | Age: 73
End: 2023-09-26
Payer: MEDICARE

## 2023-09-26 VITALS
WEIGHT: 138.6 LBS | HEIGHT: 65 IN | BODY MASS INDEX: 23.09 KG/M2 | DIASTOLIC BLOOD PRESSURE: 78 MMHG | SYSTOLIC BLOOD PRESSURE: 175 MMHG

## 2023-09-26 DIAGNOSIS — M17.0 BILATERAL PRIMARY OSTEOARTHRITIS OF KNEE: Primary | ICD-10-CM

## 2023-09-26 DIAGNOSIS — M25.562 ARTHRALGIA OF LEFT KNEE: ICD-10-CM

## 2023-09-26 RX ORDER — METHYLPREDNISOLONE ACETATE 40 MG/ML
40 INJECTION, SUSPENSION INTRA-ARTICULAR; INTRALESIONAL; INTRAMUSCULAR; SOFT TISSUE
Status: COMPLETED | OUTPATIENT
Start: 2023-09-26 | End: 2023-09-26

## 2023-09-26 RX ORDER — LIDOCAINE HYDROCHLORIDE 20 MG/ML
2 INJECTION, SOLUTION INFILTRATION; PERINEURAL
Status: COMPLETED | OUTPATIENT
Start: 2023-09-26 | End: 2023-09-26

## 2023-09-26 RX ADMIN — LIDOCAINE HYDROCHLORIDE 2 ML: 20 INJECTION, SOLUTION INFILTRATION; PERINEURAL at 10:59

## 2023-09-26 RX ADMIN — METHYLPREDNISOLONE ACETATE 40 MG: 40 INJECTION, SUSPENSION INTRA-ARTICULAR; INTRALESIONAL; INTRAMUSCULAR; SOFT TISSUE at 10:59

## 2023-09-26 NOTE — PROGRESS NOTES
Office Note     Name: Sahara Hardwick    : 1950     MRN: [id number]     Chief Complaint  Pain of the Left Knee (States she has been having pain for a few weeks, no known injury.)    Subjective     History of Present Illness:  Sahara Hardwick is a 73 y.o. female presenting today for chronic left knee pain that's been occurring on and off for approximately 1 year, recently worse in the past few weeks. She denies known recent or previous injury.  Has history of right knee osteoarthritis that felt similar.  Has history of Right knee TKA for osteoarthritis which helped a lot.      Review of Systems   Constitutional:  Negative for fever.   HENT:  Negative for dental problem and voice change.    Eyes:  Negative for visual disturbance.   Respiratory:  Negative for shortness of breath.    Cardiovascular:  Negative for chest pain.   Gastrointestinal:  Negative for abdominal pain.   Genitourinary:  Negative for dysuria.   Musculoskeletal:  Positive for arthralgias (left knee) and joint swelling (left knee). Negative for gait problem.   Skin:  Negative for rash.   Neurological:  Negative for speech difficulty.   Hematological:  Does not bruise/bleed easily.   Psychiatric/Behavioral:  Negative for confusion.       Past Medical History:   Diagnosis Date    Allergic     seasonal    Arthritis     Celiac disease     Celiac disease     Diverticulitis     Diverticulosis     GERD (gastroesophageal reflux disease)     High cholesterol     History of medical problems Rt leg lesion, squamous cell carcinoma removed    2021    Hypertension     Kidney stone     Osteopenia     osteopenia    SCC (squamous cell carcinoma), leg, right     excision- derm     Sciatica 2019        Past Surgical History:   Procedure Laterality Date    APPENDECTOMY      CHOLECYSTECTOMY      COLONOSCOPY      COLONOSCOPY W/ POLYPECTOMY       dr micheal corbett    GALLBLADDER SURGERY  1991    HAND SURGERY  2015    right  middle finger arthritis nodule removed.    HYSTERECTOMY  1991    JOINT REPLACEMENT Right 2017    knee done by Dr. Dewitt    UT ARTHRP KNE CONDYLE&PLATU MEDIAL&LAT COMPARTMENTS Right 2017    Procedure: Elective right total knee replacement (BIOMET);  Surgeon: John Dewitt MD;  Location: [facility] OR;  Service: Orthopedics       Family History   Problem Relation Age of Onset    Osteoarthritis Mother     Heart block Mother     Cancer Mother         breast/     Arthritis Mother     Thyroid disease Mother     Heart block Father     Diabetes Father              Hyperlipidemia Father     Cancer Father         colon/     Heart disease Father     Coronary artery disease Other        Social History     Socioeconomic History    Marital status:    Tobacco Use    Smoking status: Never    Smokeless tobacco: Never   Substance and Sexual Activity    Alcohol use: Never    Drug use: Never    Sexual activity: Yes     Partners: Male     Birth control/protection: Hysterectomy     Comment:  for 50 yrs/ only         Current Outpatient Medications:     acetaminophen (TYLENOL) 650 MG 8 hr tablet, Take 1 tablet by mouth Every 8 (Eight) Hours As Needed for Mild Pain., Disp: , Rfl:     cholecalciferol (VITAMIN D3) 25 MCG (1000 UT) tablet, Take 2 tablets by mouth Daily., Disp: , Rfl:     ciclopirox (PENLAC) 8 % solution, apply to affected nails once daily., Disp: , Rfl:     lisinopril-hydrochlorothiazide (PRINZIDE,ZESTORETIC) 20-12.5 MG per tablet, Take 0.5 tablets by mouth Daily., Disp: 45 tablet, Rfl: 2    Multiple Vitamins-Minerals (MULTIVITAMIN ADULT PO), Take 1 tablet by mouth Daily., Disp: , Rfl:     rosuvastatin (Crestor) 10 MG tablet, Take 1 tablet by mouth Every Night., Disp: 90 tablet, Rfl: 1    tacrolimus (PROGRAF) 1 MG capsule, Apply 1 capsule to the mouth or throat Take As Directed. Empty contents of 1 capsule into 1 L distilled water; Once dissolved, swish and spit up to  "4 times a day., Disp: , Rfl:     Allergies   Allergen Reactions    Gluten Meal GI Intolerance    Milk-Related Compounds GI Intolerance    Wheat Diarrhea    Latex Rash     Tape and adhesive bandages cause rash            Objective   /78   Ht 165.1 cm (65\")   Wt 62.9 kg (138 lb 9.6 oz)   LMP  (LMP Unknown)   BMI 23.06 kg/m²    BMI is within normal parameters. No other follow-up for BMI required.       Physical Exam  Musculoskeletal:      Right knee: Effusion present.      Instability Tests: Medial Joseph test positive. Lateral Joseph test negative.     Right Knee Exam     Tenderness   The patient is experiencing tenderness in the medial joint line and medial retinaculum.    Range of Motion   The patient has normal right knee ROM.    Tests   Joseph:  Medial - positive Lateral - negative  Lachman:  Anterior - negative    Posterior - negative    Other   Erythema: absent  Sensation: normal  Pulse: present  Swelling: moderate  Effusion: effusion present         Extremity DVT signs are negative by clinical screen.     Independent Review of Radiographic Studies:    2 view plain films of the left knee reviewed in office today for evaluation of pain with comparison views and independently reviewed by me.  There is severe medial joint space narrowing of the left knee and mild joint space narrowing of the lateral compartment.  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  No acute osseous abnormalities were noted.    Large Joint Arthrocentesis: L knee  Date/Time: 9/26/2023 10:59 AM  Consent given by: patient  Site marked: site marked  Timeout: Immediately prior to procedure a time out was called to verify the correct patient, procedure, equipment, support staff and site/side marked as required   Supporting Documentation  Indications: pain   Procedure Details  Location: knee - L knee  Preparation: Patient was prepped and draped in " the usual sterile fashion  Needle size: 22 G  Approach: anterolateral  Medications administered: 40 mg methylPREDNISolone acetate 40 MG/ML; 2 mL lidocaine 2%  Patient tolerance: patient tolerated the procedure well with no immediate complications        Assessment and Plan   Diagnoses and all orders for this visit:    1. Bilateral primary osteoarthritis of knee (Primary)    2. Arthralgia of left knee  -     XR Knee 1 or 2 View Left    Other orders  -     Large Joint Arthrocentesis       Regular exercise as tolerated  Orthopedic activities reviewed and patient expressed appreciation  Discussion of orthopedic goals  Risk, benefits, and merits of treatment alternatives reviewed with the patient and questions answered  Call or notify for any adverse effect from injection therapy  Elevate leg for residual swelling  Reduced physical activity as appropriate  Weight bearing parameters reviewed  Ice, heat, and/or modalities as beneficial  Guided on proper techniques for mobility, strength, agility and/or conditioning exercises    Recommendations/Plan:  Exercise, medications, injections, other patient advice, and return appointment as noted.  Patient is encouraged to call or return for any issues or concerns.    Patient was given a corticosteroid injection to the left knee today for symptomatic relief.  Advised light activity over the next couple of days as well as ice and heat to the area as needed beneficial.  Advised over-the-counter analgesics as needed.  I did discuss for joint replacement surgery with the patient today however would like to see if corticosteroids can manage her symptoms.  Recheck scheduled for 3 months and may repeat injection if requested and beneficial.  Advised patient to call or return office sooner if needed.  Patient is agreeable to plan.    Return in about 3 months (around 12/26/2023), or if symptoms worsen or fail to improve.  Patient agreeable to call or return sooner for any concerns.

## 2023-10-03 ENCOUNTER — TELEPHONE (OUTPATIENT)
Dept: ORTHOPEDIC SURGERY | Facility: CLINIC | Age: 73
End: 2023-10-03
Payer: MEDICARE

## 2023-10-03 NOTE — TELEPHONE ENCOUNTER
Per Markus: As it has only been a week since her injection, I would like for her to try at least a couple more weeks before starting other options, such as gel injections. Advise to call back in 2 weeks if still no relief and then we can consider gel or other options.     Patient notified of Markus's directions. She will call back if no improvement in a couple of weeks.

## 2023-10-03 NOTE — TELEPHONE ENCOUNTER
Caller: THI STOCKTON    Relationship to Patient: the patient    Phone Number: [phone] (home)     Reason for Call: PATIENT CALLED IN STATES [facility] TOLD HER IF THE INJECTION OF LEFT KNEE DIDN'T HELP CALL AND LET HIM KNOW. PATIENT STATES IT DIDNT

## 2023-11-09 ENCOUNTER — OFFICE VISIT (OUTPATIENT)
Dept: ORTHOPEDIC SURGERY | Facility: CLINIC | Age: 73
End: 2023-11-09
Payer: MEDICARE

## 2023-11-09 VITALS
HEIGHT: 65 IN | BODY MASS INDEX: 23.26 KG/M2 | HEART RATE: 67 BPM | DIASTOLIC BLOOD PRESSURE: 80 MMHG | SYSTOLIC BLOOD PRESSURE: 135 MMHG | WEIGHT: 139.6 LBS

## 2023-11-09 DIAGNOSIS — M17.12 PRIMARY OSTEOARTHRITIS OF LEFT KNEE: Primary | ICD-10-CM

## 2023-11-09 NOTE — PROGRESS NOTES
Office Note     Name: Sahara Hardwick    : 1950     MRN: [id number]     Chief Complaint  Follow-up of the Left Knee (States the injection she had on 23 helped some but she is still having pain.)    Subjective     History of Present Illness:  Sahara Hardwick is a 73 y.o. female presenting today for follow-up for left knee pain.  Patient had a cortisone injection in her left knee on 2023 and states that it helped a lot in the beginning although it may be beginning to wear off now.  She is requesting another cortisone injection today and she also wants to discuss surgery options for the left knee.  She states she had a right knee total knee replacement with Cervoni and states that it is doing great.    Review of Systems   Constitutional:  Negative for fever.   HENT:  Negative for dental problem and voice change.    Eyes:  Negative for visual disturbance.   Respiratory:  Negative for shortness of breath.    Cardiovascular:  Negative for chest pain.   Gastrointestinal:  Negative for abdominal pain.   Genitourinary:  Negative for dysuria.   Musculoskeletal:  Positive for arthralgias (left knee) and joint swelling (left knee). Negative for gait problem.   Skin:  Negative for rash.   Neurological:  Negative for speech difficulty.   Hematological:  Does not bruise/bleed easily.   Psychiatric/Behavioral:  Negative for confusion.         Past Medical History:   Diagnosis Date    Allergic     seasonal    Arthritis     Celiac disease     Celiac disease     Diverticulitis     Diverticulosis     GERD (gastroesophageal reflux disease)     High cholesterol     History of medical problems Rt leg lesion, squamous cell carcinoma removed    2021    Hypertension     Kidney stone     Osteopenia     osteopenia    SCC (squamous cell carcinoma), leg, right     excision- derm     Sciatica 2019        Past Surgical History:   Procedure Laterality Date    APPENDECTOMY      CHOLECYSTECTOMY       COLONOSCOPY      COLONOSCOPY W/ POLYPECTOMY       dr micheal corbett    GALLBLADDER SURGERY  1991    HAND SURGERY  2015    right middle finger arthritis nodule removed.    HYSTERECTOMY  1991    JOINT REPLACEMENT Right 2017    knee done by Dr. Dewitt    PA ARTHRP KNE CONDYLE&PLATU MEDIAL&LAT COMPARTMENTS Right 2017    Procedure: Elective right total knee replacement (BIOMET);  Surgeon: John Dewitt MD;  Location: [facility] OR;  Service: Orthopedics       Family History   Problem Relation Age of Onset    Osteoarthritis Mother     Heart block Mother     Cancer Mother         breast/     Arthritis Mother     Thyroid disease Mother     Heart block Father     Diabetes Father              Hyperlipidemia Father     Cancer Father         colon/     Heart disease Father     Coronary artery disease Other        Social History     Socioeconomic History    Marital status:    Tobacco Use    Smoking status: Never    Smokeless tobacco: Never   Substance and Sexual Activity    Alcohol use: Never    Drug use: Never    Sexual activity: Yes     Partners: Male     Birth control/protection: Hysterectomy     Comment:  for 50 yrs/ only         Current Outpatient Medications:     acetaminophen (TYLENOL) 650 MG 8 hr tablet, Take 1 tablet by mouth Every 8 (Eight) Hours As Needed for Mild Pain., Disp: , Rfl:     lisinopril-hydrochlorothiazide (PRINZIDE,ZESTORETIC) 20-12.5 MG per tablet, Take 0.5 tablets by mouth Daily., Disp: 45 tablet, Rfl: 2    Multiple Vitamins-Minerals (MULTIVITAMIN ADULT PO), Take 1 tablet by mouth Daily., Disp: , Rfl:     rosuvastatin (Crestor) 10 MG tablet, Take 1 tablet by mouth Every Night., Disp: 90 tablet, Rfl: 1    tacrolimus (PROGRAF) 1 MG capsule, Apply 1 capsule to the mouth or throat Take As Directed. Empty contents of 1 capsule into 1 L distilled water; Once dissolved, swish and spit up to 4 times a day., Disp: , Rfl:     Allergies   Allergen  "Reactions    Gluten Meal GI Intolerance    Milk-Related Compounds GI Intolerance    Wheat Diarrhea    Latex Rash     Tape and adhesive bandages cause rash            Objective   /80   Pulse 67   Ht 165.1 cm (65\")   Wt 63.3 kg (139 lb 9.6 oz)   LMP  (LMP Unknown)   BMI 23.23 kg/m²    BMI is within normal parameters. No other follow-up for BMI required.       Physical Exam  Ortho Exam   Extremity DVT signs are negative by clinical screen.     Independent Review of Radiographic Studies:    No new imaging done today.    Procedures    Assessment and Plan   Diagnoses and all orders for this visit:    1. Primary osteoarthritis of left knee (Primary)       Regular exercise as tolerated  Orthopedic activities reviewed and patient expressed appreciation  Discussion of orthopedic goals  Risk, benefits, and merits of treatment alternatives reviewed with the patient and questions answered  Use brace as instructed  Elevate foot for residual swelling  Reduced physical activity as appropriate  Weight bearing parameters reviewed  Ice, heat, and/or modalities as beneficial  Guided on proper techniques for mobility, strength, agility and/or conditioning exercises    Recommendations/Plan:  Exercise, medications, injections, other patient advice, and return appointment as noted.  Brace: Knee ligament stabilizing brace.  Patient is encouraged to call or return for any issues or concerns.    Patient was requesting another cortisone injection into the left knee however it is too soon since her previous injection which was done on 9/26/2023.  Advised that we could begin the process for getting gel injections approved however patient declined this.  She also wanted to discuss left knee replacement as her right knee replacement is doing very well.  I discussed the surgery with her although she is mostly familiar with everything.  She did have questions about a knee replacement involving computer systems and newer techniques " although I advised that I was not familiar with those.  Advised that she should schedule her next follow-up with Dr. Dewitt to discuss knee replacement more in depth.  Patient is agreeable with this plan.  Advised to schedule follow-up in approximately 6 weeks so that she can discuss this with Dr. Dewitt as well as have another cortisone injection if needed and requested.    Return in about 6 weeks (around 12/21/2023) for Discuss surgery with Renate .  Patient agreeable to call or return sooner for any concerns.

## 2023-11-13 ENCOUNTER — OFFICE VISIT (OUTPATIENT)
Dept: GASTROENTEROLOGY | Facility: CLINIC | Age: 73
End: 2023-11-13
Payer: MEDICARE

## 2023-11-13 VITALS
HEIGHT: 65 IN | HEART RATE: 66 BPM | SYSTOLIC BLOOD PRESSURE: 156 MMHG | BODY MASS INDEX: 23.32 KG/M2 | TEMPERATURE: 98.2 F | DIASTOLIC BLOOD PRESSURE: 69 MMHG | WEIGHT: 140 LBS

## 2023-11-13 DIAGNOSIS — K90.0 CELIAC DISEASE: Primary | ICD-10-CM

## 2023-11-13 DIAGNOSIS — R10.32 LEFT LOWER QUADRANT ABDOMINAL PAIN: ICD-10-CM

## 2023-11-13 DIAGNOSIS — R19.4 CHANGE IN BOWEL HABITS: ICD-10-CM

## 2023-11-13 DIAGNOSIS — Z86.010 PERSONAL HISTORY OF COLONIC POLYPS: ICD-10-CM

## 2023-11-13 PROCEDURE — 1160F RVW MEDS BY RX/DR IN RCRD: CPT | Performed by: INTERNAL MEDICINE

## 2023-11-13 PROCEDURE — 3077F SYST BP >= 140 MM HG: CPT | Performed by: INTERNAL MEDICINE

## 2023-11-13 PROCEDURE — 1159F MED LIST DOCD IN RCRD: CPT | Performed by: INTERNAL MEDICINE

## 2023-11-13 PROCEDURE — 99204 OFFICE O/P NEW MOD 45 MIN: CPT | Performed by: INTERNAL MEDICINE

## 2023-11-13 PROCEDURE — 3078F DIAST BP <80 MM HG: CPT | Performed by: INTERNAL MEDICINE

## 2023-11-13 RX ORDER — CHOLECALCIFEROL (VITAMIN D3) 125 MCG
2000 CAPSULE ORAL DAILY
COMMUNITY

## 2023-11-13 RX ORDER — SODIUM CHLORIDE 9 MG/ML
70 INJECTION, SOLUTION INTRAVENOUS CONTINUOUS PRN
OUTPATIENT
Start: 2023-11-13

## 2023-11-13 RX ORDER — SODIUM, POTASSIUM,MAG SULFATES 17.5-3.13G
2 SOLUTION, RECONSTITUTED, ORAL ORAL ONCE
Qty: 354 ML | Refills: 0 | Status: SHIPPED | OUTPATIENT
Start: 2023-11-13 | End: 2023-11-13

## 2023-11-13 NOTE — PROGRESS NOTES
New Patient Consult      Date: 2023   Patient Name: Sahara Hardwick  MRN: [id number]  : 1950     Referring Physician: Shona Horn MD    Chief Complaint   Patient presents with    Colon Cancer Screening    Colon Polyps       History of Present Illness: Sahara Hardwick is a 73 y.o. female who is here today to establish care with Gastroenterology for to schedule colonoscopy for surveillance.      Patient states that she was diagnosed with celiac disease in  after her EGD in May 2019.  EGD showed a villous blunting in the duodenum.  Subsequently had a serology done for celiac disease which revealed a borderline elevated deamidated gliadin antibodies IgA 21 normal being 0-19.  Also had a borderline elevated IgA TTG 5 normal being 0-3.  She has been following gluten-free diet for most part.  She did have a significant issue with the diarrhea at that time she was given rifaximin for 3 weeks that helped on that.  She also has a intermittent left-sided abdominal pain now without any significant diarrhea.  She will have 1 or 2 soft to firm bowel movement daily.  Colonoscopy done in 2019 and had a 2 polyps removed advised to repeat colonoscopy in 3 to 5 years time.  She does get issue with certain foods especially spicy food with the diarrhea.    Her father had a colon cancer at the age of 60s.  She denies any reflux symptoms no dysphagia.  She is a non-smoker nonalcoholic.    Subjective      Past Medical History:   Past Medical History:   Diagnosis Date    Allergic     seasonal    Arthritis     Celiac disease     Celiac disease     Colon polyp     Diverticulitis     Diverticulitis of colon 10 yrs ago    Diverticulosis     GERD (gastroesophageal reflux disease)     High cholesterol     History of medical problems Rt leg lesion, squamous cell carcinoma removed    2021    Hypertension     Kidney stone     Osteopenia     osteopenia    SCC (squamous cell carcinoma), leg, right     excision-  derm     Sciatica 2019       Past Surgical History:   Past Surgical History:   Procedure Laterality Date    APPENDECTOMY      CHOLECYSTECTOMY      COLONOSCOPY      COLONOSCOPY W/ POLYPECTOMY       dr micheal corbett    GALLBLADDER SURGERY      HAND SURGERY      right middle finger arthritis nodule removed.    JOINT REPLACEMENT Right 2017    knee done by Dr. Renate REYES ARTHRP KNE CONDYLE&PLATU MEDIAL&LAT COMPARTMENTS Right 2017    Procedure: Elective right total knee replacement (BIOMET);  Surgeon: John Dewitt MD;  Location: [facility];  Service: Orthopedics    TOTAL ABDOMINAL HYSTERECTOMY WITH SALPINGO OOPHORECTOMY         Family History:   Family History   Problem Relation Age of Onset    Osteoarthritis Mother     Heart block Mother     Cancer Mother         breast/     Arthritis Mother     Thyroid disease Mother     Colon cancer Father     Heart block Father     Diabetes Father              Hyperlipidemia Father     Cancer Father         colon/     Heart disease Father     Coronary artery disease Other        Social History:   Social History     Socioeconomic History    Marital status:    Tobacco Use    Smoking status: Never    Smokeless tobacco: Never   Vaping Use    Vaping Use: Never used   Substance and Sexual Activity    Alcohol use: Never    Drug use: Never    Sexual activity: Defer     Birth control/protection: Hysterectomy         Current Outpatient Medications:     acetaminophen (TYLENOL) 650 MG 8 hr tablet, Take 1 tablet by mouth Every 8 (Eight) Hours As Needed for Mild Pain., Disp: , Rfl:     Cholecalciferol (Vitamin D3) 50 MCG (2000 UT) tablet, Take 1 tablet by mouth Daily., Disp: , Rfl:     Cimetidine (TAGAMET PO), Take  by mouth Daily As Needed., Disp: , Rfl:     lisinopril-hydrochlorothiazide (PRINZIDE,ZESTORETIC) 20-12.5 MG per tablet, Take 0.5 tablets by mouth Daily., Disp: 45 tablet, Rfl: 2    Multiple  "Vitamins-Minerals (MULTIVITAMIN ADULT PO), Take 1 tablet by mouth Daily., Disp: , Rfl:     rosuvastatin (Crestor) 10 MG tablet, Take 1 tablet by mouth Every Night., Disp: 90 tablet, Rfl: 1    tacrolimus (PROGRAF) 1 MG capsule, Apply 1 capsule to the mouth or throat Take As Directed. Empty contents of 1 capsule into 1 L distilled water; Once dissolved, swish and spit up to 4 times a day., Disp: , Rfl:     sodium-potassium-magnesium sulfates (Suprep Bowel Prep Kit) 17.5-3.13-1.6 GM/177ML solution oral solution, Take 2 bottles by mouth 1 (One) Time for 1 dose. Please see prep instructions from office., Disp: 354 mL, Rfl: 0    Allergies   Allergen Reactions    Gluten Meal GI Intolerance    Milk-Related Compounds GI Intolerance    Wheat Diarrhea    Latex Rash     Tape and adhesive bandages cause rash        Review of Systems:   Review of Systems   Constitutional:  Negative for appetite change, fatigue, fever and unexpected weight loss.   HENT:  Negative for trouble swallowing.    Gastrointestinal:  Positive for abdominal pain (LLQ tenderness, occasional), diarrhea (only occasional, has taken Xifaxan which helped her in the past.) and GERD. Negative for abdominal distention, anal bleeding, blood in stool, constipation, nausea, rectal pain, vomiting and indigestion.       The following portions of the patient's history were reviewed and updated as appropriate: allergies, current medications, past family history, past medical history, past social history, past surgical history and problem list.    Objective     Physical Exam:  Vital Signs:   Vitals:    11/13/23 1359   BP: 156/69   Pulse: 66   Temp: 98.2 °F (36.8 °C)   TempSrc: Infrared   Weight: 63.5 kg (140 lb)   Height: 165.1 cm (65\")  Comment: per patient.       Physical Exam  Constitutional:       Appearance: Normal appearance.   HENT:      Head: Normocephalic and atraumatic.   Eyes:      Conjunctiva/sclera: Conjunctivae normal.   Abdominal:      General: Abdomen is " flat. There is no distension.      Palpations: There is no mass.      Tenderness: There is no abdominal tenderness. There is no guarding or rebound.      Hernia: No hernia is present.   Musculoskeletal:      Cervical back: Normal range of motion and neck supple.   Neurological:      Mental Status: She is alert.           Results Review:   I have reviewed the patient's new clinical and imaging results and agree with the interpretation.     No visits with results within 90 Day(s) from this visit.   Latest known visit with results is:   Office Visit on 03/21/2023   Component Date Value Ref Range Status    WBC 07/05/2023 7.12  3.40 - 10.80 10*3/mm3 Final    RBC 07/05/2023 4.78  3.77 - 5.28 10*6/mm3 Final    Hemoglobin 07/05/2023 13.9  12.0 - 15.9 g/dL Final    Hematocrit 07/05/2023 43.0  34.0 - 46.6 % Final    MCV 07/05/2023 90.0  79.0 - 97.0 fL Final    MCH 07/05/2023 29.1  26.6 - 33.0 pg Final    MCHC 07/05/2023 32.3  31.5 - 35.7 g/dL Final    RDW 07/05/2023 13.2  12.3 - 15.4 % Final    Platelets 07/05/2023 376  140 - 450 10*3/mm3 Final    Neutrophil Rel % 07/05/2023 45.1  42.7 - 76.0 % Final    Lymphocyte Rel % 07/05/2023 42.1  19.6 - 45.3 % Final    Monocyte Rel % 07/05/2023 10.4  5.0 - 12.0 % Final    Eosinophil Rel % 07/05/2023 2.0  0.3 - 6.2 % Final    Basophil Rel % 07/05/2023 0.3  0.0 - 1.5 % Final    Neutrophils Absolute 07/05/2023 3.21  1.70 - 7.00 10*3/mm3 Final    Lymphocytes Absolute 07/05/2023 3.00  0.70 - 3.10 10*3/mm3 Final    Monocytes Absolute 07/05/2023 0.74  0.10 - 0.90 10*3/mm3 Final    Eosinophils Absolute 07/05/2023 0.14  0.00 - 0.40 10*3/mm3 Final    Basophils Absolute 07/05/2023 0.02  0.00 - 0.20 10*3/mm3 Final    Immature Granulocyte Rel % 07/05/2023 0.1  0.0 - 0.5 % Final    Immature Grans Absolute 07/05/2023 0.01  0.00 - 0.05 10*3/mm3 Final    nRBC 07/05/2023 0.0  0.0 - 0.2 /100 WBC Final    Glucose 07/05/2023 98  65 - 99 mg/dL Final    BUN 07/05/2023 14  8 - 23 mg/dL Final    Creatinine  07/05/2023 0.70  0.57 - 1.00 mg/dL Final    EGFR Result 07/05/2023 92.6  >60.0 mL/min/1.73 Final    Comment: GFR Normal >60  Chronic Kidney Disease <60  Kidney Failure <15  The GFR formula is only valid for adults with stable renal  function between ages 18 and 70.      BUN/Creatinine Ratio 07/05/2023 20.0  7.0 - 25.0 Final    Sodium 07/05/2023 144  136 - 145 mmol/L Final    Potassium 07/05/2023 4.3  3.5 - 5.2 mmol/L Final    Chloride 07/05/2023 103  98 - 107 mmol/L Final    Total CO2 07/05/2023 27.2  22.0 - 29.0 mmol/L Final    Calcium 07/05/2023 10.0  8.6 - 10.5 mg/dL Final    Total Protein 07/05/2023 6.4  6.0 - 8.5 g/dL Final    Albumin 07/05/2023 4.6  3.5 - 5.2 g/dL Final    Globulin 07/05/2023 1.8  gm/dL Final    A/G Ratio 07/05/2023 2.6  g/dL Final    Total Bilirubin 07/05/2023 0.5  0.0 - 1.2 mg/dL Final    Alkaline Phosphatase 07/05/2023 63  39 - 117 U/L Final    AST (SGOT) 07/05/2023 18  1 - 32 U/L Final    ALT (SGPT) 07/05/2023 14  1 - 33 U/L Final    Total Cholesterol 07/05/2023 162  0 - 200 mg/dL Final    Comment: Cholesterol Reference Ranges  (U.S. Department of Health and Human Services ATP III  Classifications)  Desirable          <200 mg/dL  Borderline High    200-239 mg/dL  High Risk          >240 mg/dL  Triglyceride Reference Ranges  (U.S. Department of Health and Human Services ATP III  Classifications)  Normal           <150 mg/dL  Borderline High  150-199 mg/dL  High             200-499 mg/dL  Very High        >500 mg/dL  HDL Reference Ranges  (U.S. Department of Health and Human Services ATP III  Classifications)  Low     <40 mg/dl (major risk factor for CHD)  High    >60 mg/dl ('negative' risk factor for CHD)  LDL Reference Ranges  (U.S. Department of Health and Human Services ATP III  Classifications)  Optimal          <100 mg/dL  Near Optimal     100-129 mg/dL  Borderline High  130-159 mg/dL  High             160-189 mg/dL  Very High        >189 mg/dL      Triglycerides 07/05/2023 157 (H)  0  - 150 mg/dL Final    HDL Cholesterol 07/05/2023 51  40 - 60 mg/dL Final    VLDL Cholesterol [place] 07/05/2023 27  5 - 40 mg/dL Final    LDL Chol Calc (NIH) 07/05/2023 84  0 - 100 mg/dL Final    Hemoglobin A1C 07/05/2023 5.70 (H)  4.80 - 5.60 % Final    Comment: Hemoglobin A1C Ranges:  Increased Risk for Diabetes  5.7% to 6.4%  Diabetes                     >= 6.5%  Diabetic Goal                < 7.0%        Mammo Screening Digital Tomosynthesis Bilateral With CAD    Result Date: 11/8/2023  FINAL IMPRESSION: ACR BI-RADS 2: Benign finding. RECOMMENDATIONS: Routine annual screening mammography. NOTE: A LETTER INCLUDING RESULTS AND RECOMMENDATIONS WAS SENT TO THE PATIENT. DENSITY NOTIFICATION WAS PROVIDED IN THIS LETTER TO PATIENTS WITH TYPE 3 BREAST TISSUE PATTERN (51-75% of the breast composed of glandular tissue) OR TYPE 4 BREAST TISSUE PATTERN (>75% of the breast composed of glandular tissue) ALONG WITH THE RECOMMENDATION FOR A DISCUSSION BETWEEN THE PATIENT AND HER PERSONAL PHYSICIAN REGARDING HER BREAST CANCER RISK FACTORS (INCLUDING, BUT NOT LIMITED TO FAMILY HISTORY AND BREAST TISSUE DENSITY). Patient information entered into a reminder system with a target due date for the next mammogram. At our facility, a [place] marker is positioned over a visible skin lesion and a linear marker is used to indicate a scar. A triangular marker is placed on a self reported palpable finding. Note: Mammography does not detect approximately 10-15% of breast cancers. An annual clinical breast exam by the patient's breast care physician and regular monthly self breast exams by the patient are integral parts of breast cancer screening, in addition to annual mammography. A normal mammogram does not completely exclude the presence of breast cancer, especially if there is an abnormal finding on physical exam. When clinically indicated, a biopsy should not be deferred because of a normal mammogram report. cc:      Assessment / Plan       Assessment & Plan:  1. Celiac disease  She was diagnosed with a celiac disease in 2019 with a serology and EGD and duodenal biopsies.  Old records reviewed.  Lab work done in 2019 revealed a borderline elevated deamidated gliadin antibodies IgA 21 normal being 0-19.  Also had a borderline elevated IgA TTG 5 normal being 0-3.   EGD with a duodenal biopsy revealed villous blunting.  She has been following gluten-free diet however she is not very [place] on that.  Overall her bowel movement more or less regularized now with 1 or 2 soft formed bowel movement daily.  Recent lab work done on 7/5/2023 reviewed which reveals a normal CBC and CMP.    We will get a celiac serology  EGD for surveillance  Continue gluten-free diet    - Case Request; Standing  - Implement Anesthesia Orders Day of Procedure; Standing  - Obtain Informed Consent; Standing  - Verify NPO; Standing  - Verify Bowel Prep Was Successful; Standing  - Oxygen Therapy- Nasal Cannula; 2 LPM; Standing  - sodium chloride 0.9 % infusion  - Case Request  - IgA; Future  - Tissue Transglutaminase, IgA; Future    2. Personal history of colonic polyps  Her last colonoscopy was in 2019 and had a 2 polyps removed.  Advised to repeat the colonoscopy in 3 to 5 years time.  Based on the prior recommendation will schedule for colonoscopy early next year    The indications, technique, alternatives and potential risk and complications were discussed with the patient including but not limited to bleeding, bowel perforations, missing lesions and anesthetic complications. The patient understands and wishes to proceed with the procedure and has given their verbal consent. Written patient education information was given to the patient.   The patient will call if they have further questions before procedure.      - Case Request; Standing  - Implement Anesthesia Orders Day of Procedure; Standing  - Obtain Informed Consent; Standing  - Verify NPO; Standing  - Verify Bowel Prep  Was Successful; Standing  - Oxygen Therapy- Nasal Cannula; 2 LPM; Standing  - sodium chloride 0.9 % infusion  - Case Request    3. Left lower quadrant abdominal pain  4. Change in bowel habits  Patient history is suspicious for mild underlying irritable bowel disorder.  Some of the foods make her symptoms worse including spicy foods.  She did respond very well to rifaximin in the past.  Currently she is having intermittent left-sided abdominal pain otherwise having 1 or 2 soft formed bowel movement.    Continue to monitor  Colonoscopy with a biopsy  Consider CT abdomen pelvis with contrast in the near future if symptoms persist.      Follow Up:   Return for Follow Up after procedure.    Nakia Smallwood MD  [facility] [place]  11/13/2023  19:24 EST    Please note that portions of this note may have been completed with a voice recognition program.

## 2023-11-14 ENCOUNTER — LAB (OUTPATIENT)
Dept: LAB | Facility: HOSPITAL | Age: 73
End: 2023-11-14
Payer: MEDICARE

## 2023-11-14 DIAGNOSIS — K90.0 CELIAC DISEASE: ICD-10-CM

## 2023-11-14 PROBLEM — Z86.010 PERSONAL HISTORY OF COLONIC POLYPS: Status: ACTIVE | Noted: 2023-11-13

## 2023-11-14 PROBLEM — Z86.0100 PERSONAL HISTORY OF COLONIC POLYPS: Status: ACTIVE | Noted: 2023-11-13

## 2023-11-14 LAB — IGA1 MFR SER: 134 MG/DL (ref 70–400)

## 2023-11-14 PROCEDURE — 36415 COLL VENOUS BLD VENIPUNCTURE: CPT

## 2023-11-14 PROCEDURE — 86364 TISS TRNSGLTMNASE EA IG CLAS: CPT

## 2023-11-14 PROCEDURE — 82784 ASSAY IGA/IGD/IGG/IGM EACH: CPT

## 2023-11-15 LAB — TTG IGA SER-ACNC: <2 U/ML (ref 0–3)

## 2023-11-20 ENCOUNTER — OFFICE VISIT (OUTPATIENT)
Dept: INTERNAL MEDICINE | Facility: CLINIC | Age: 73
End: 2023-11-20
Payer: MEDICARE

## 2023-11-20 VITALS
RESPIRATION RATE: 16 BRPM | DIASTOLIC BLOOD PRESSURE: 85 MMHG | TEMPERATURE: 98 F | WEIGHT: 139 LBS | HEIGHT: 65 IN | HEART RATE: 63 BPM | SYSTOLIC BLOOD PRESSURE: 137 MMHG | OXYGEN SATURATION: 99 % | BODY MASS INDEX: 23.16 KG/M2

## 2023-11-20 DIAGNOSIS — E78.2 MIXED HYPERLIPIDEMIA: ICD-10-CM

## 2023-11-20 DIAGNOSIS — M25.562 PAIN IN JOINT OF LEFT KNEE: ICD-10-CM

## 2023-11-20 DIAGNOSIS — R73.01 IMPAIRED FASTING GLUCOSE: ICD-10-CM

## 2023-11-20 DIAGNOSIS — I10 BENIGN ESSENTIAL HYPERTENSION: Primary | ICD-10-CM

## 2023-11-20 PROCEDURE — 1160F RVW MEDS BY RX/DR IN RCRD: CPT | Performed by: INTERNAL MEDICINE

## 2023-11-20 PROCEDURE — 99214 OFFICE O/P EST MOD 30 MIN: CPT | Performed by: INTERNAL MEDICINE

## 2023-11-20 PROCEDURE — 3075F SYST BP GE 130 - 139MM HG: CPT | Performed by: INTERNAL MEDICINE

## 2023-11-20 PROCEDURE — 1159F MED LIST DOCD IN RCRD: CPT | Performed by: INTERNAL MEDICINE

## 2023-11-20 PROCEDURE — 3079F DIAST BP 80-89 MM HG: CPT | Performed by: INTERNAL MEDICINE

## 2023-11-20 RX ORDER — LISINOPRIL AND HYDROCHLOROTHIAZIDE 20; 12.5 MG/1; MG/1
0.5 TABLET ORAL DAILY
Qty: 45 TABLET | Refills: 2 | Status: SHIPPED | OUTPATIENT
Start: 2023-11-20

## 2023-11-20 RX ORDER — ROSUVASTATIN CALCIUM 10 MG/1
10 TABLET, COATED ORAL NIGHTLY
Qty: 90 TABLET | Refills: 2 | Status: SHIPPED | OUTPATIENT
Start: 2023-11-20

## 2023-11-20 NOTE — PROGRESS NOTES
"Chief Complaint  Hypertension and Hyperlipidemia    Subjective        Sahara Hardwick presents to [facility]  HPI: Patient is here to follow up on the blood pressure  The patient is taking the blood pressure medications  and has had no side effects. The patient is also here to follow up on the cholesterol and  had lab work done .  The patient also needs refills on medications .  She complains of left knee pain and has been getting shots and sees orthopedist  Hyperlipidemia   Pertinent negatives include no chest pain or shortness of breath.   Hypertension   Pertinent negatives include no chest pain, palpitations or shortness of breath.    Hypertension      Answers submitted by the patient for this visit:  Primary Reason for Visit (Submitted on 11/13/2023)  What is the primary reason for your visit?: High Blood Pressure    Objective   Vital Signs:  /85   Pulse 63   Temp 98 °F (36.7 °C)   Resp 16   Ht 165.1 cm (65\")   Wt 63 kg (139 lb)   SpO2 99%   BMI 23.13 kg/m²   Estimated body mass index is 23.13 kg/m² as calculated from the following:    Height as of this encounter: 165.1 cm (65\").    Weight as of this encounter: 63 kg (139 lb).       BMI is within normal parameters. No other follow-up for BMI required.      Physical Exam  Vitals and nursing note reviewed.   Constitutional:       General: She is not in acute distress.     Appearance: Normal appearance. She is not diaphoretic.   HENT:      Head: Normocephalic and atraumatic.      Right Ear: External ear normal.      Left Ear: External ear normal.      Nose: Nose normal.   Eyes:      Extraocular Movements: Extraocular movements intact.      Conjunctiva/sclera: Conjunctivae normal.   Neck:      Trachea: Trachea normal.   Cardiovascular:      Rate and Rhythm: Normal rate and regular rhythm.      Heart sounds: Normal heart sounds.   Pulmonary:      Effort: Pulmonary effort is normal. No respiratory distress.      Breath sounds: " Normal breath sounds.   Abdominal:      General: Abdomen is flat.   Musculoskeletal:         General: Deformity present.      Cervical back: Neck supple.      Comments: Left  knee  Moves all limbs   Skin:     General: Skin is warm.   Neurological:      Mental Status: She is alert and oriented to person, place, and time.      Comments: No gross motor or sensory deficits        Result Review :  The following data was reviewed by: Shona Horn MD on 11/20/2023:  Common labs          3/15/2023    08:02 7/5/2023    08:04 11/14/2023    08:04   Common Labs   Glucose 100  98  98    BUN 13  14  14    Creatinine 0.82  0.70  0.68    Sodium 142  144  143    Potassium 4.5  4.3  4.1    Chloride 103  103  105    Calcium 10.1  10.0  9.8    Total Protein 6.7  6.4  6.6    Albumin 4.4  4.6  4.7    Total Bilirubin 0.5  0.5  0.6    Alkaline Phosphatase 65  63  58    AST (SGOT) 25  18  18    ALT (SGPT) 17  14  16    WBC 8.19  7.12  7.41    Hemoglobin 14.2  13.9  13.7    Hematocrit 42.7  43.0  42.8    Platelets 379  376  417    Total Cholesterol 171  162  182    Triglycerides 188  157  155    HDL Cholesterol 48  51  56    LDL Cholesterol  91  84  99    Hemoglobin A1C 5.70  5.70  5.40                   Assessment and Plan   Diagnoses and all orders for this visit:    1. Benign essential hypertension (Primary)  -     lisinopril-hydrochlorothiazide (PRINZIDE,ZESTORETIC) 20-12.5 MG per tablet; Take 0.5 tablets by mouth Daily.  Dispense: 45 tablet; Refill: 2    2. Mixed hyperlipidemia  -     CBC & Differential  -     Comprehensive Metabolic Panel  -     Lipid Panel  -     rosuvastatin (Crestor) 10 MG tablet; Take 1 tablet by mouth Every Night.  Dispense: 90 tablet; Refill: 2  -     CBC & Differential  -     Comprehensive Metabolic Panel  -     Lipid Panel    3. Impaired fasting glucose  -     Hemoglobin A1c  -     Hemoglobin A1c    4. Pain in joint of left knee    Plan:  1.  Benign essential hypertension: Will continue current medication,  low-sodium diet advised, Counseled to regularly check BP at home with goal averaging <130/80.   2.mixed hyperlipidemia:  reviewed  fasting CMP and lipid panel.  Diet and exercise counseled,  Will continue current medications  3. impaired glucose   :   reviewed  fasting CMP  and hba1c  5.4, diet and exercise counseled  4.  Left knee pain :  per orthopedist            Follow Up   Return in about 18 weeks (around 3/25/2024) for Medicare Wellness.  Patient was given instructions and counseling regarding her condition or for health maintenance advice. Please see specific information pulled into the AVS if appropriate.

## 2023-12-08 ENCOUNTER — OFFICE VISIT (OUTPATIENT)
Dept: ORTHOPEDIC SURGERY | Facility: CLINIC | Age: 73
End: 2023-12-08
Payer: MEDICARE

## 2023-12-08 VITALS — TEMPERATURE: 97.5 F | WEIGHT: 139.4 LBS | HEIGHT: 65 IN | BODY MASS INDEX: 23.22 KG/M2

## 2023-12-08 DIAGNOSIS — Z96.651 HISTORY OF TOTAL KNEE REPLACEMENT, RIGHT: ICD-10-CM

## 2023-12-08 DIAGNOSIS — M25.562 ARTHRALGIA OF LEFT KNEE: Primary | ICD-10-CM

## 2023-12-08 DIAGNOSIS — M17.12 PRIMARY OSTEOARTHRITIS OF LEFT KNEE: ICD-10-CM

## 2023-12-08 PROCEDURE — 1160F RVW MEDS BY RX/DR IN RCRD: CPT | Performed by: ORTHOPAEDIC SURGERY

## 2023-12-08 PROCEDURE — 99213 OFFICE O/P EST LOW 20 MIN: CPT | Performed by: ORTHOPAEDIC SURGERY

## 2023-12-08 PROCEDURE — 1159F MED LIST DOCD IN RCRD: CPT | Performed by: ORTHOPAEDIC SURGERY

## 2023-12-08 NOTE — PROGRESS NOTES
Subjective   Patient ID: Sahara Hardwick is a 73 y.o. right hand dominant female is here today for orthopaedic evaluation of recovery progress with treatment.  Follow-up and Pain of the Left Knee (Would like to discuss total knee arthroplasty) and Follow-up of the Right Knee (Total knee replacement 08/01/2017, doing well with no concerns.)       CHIEF COMPLAINT:    Progress since left knee symptomatic osteoarthritis injection therapy.    History of Present Illness     Progress Note:  Patient reports that with treatments and since the last visit, overall pain is unchanged and knee joint mobility is limited.  Left knee injection therapy 9/26/23 provided very mild short term relief. The patient is not currently taking pain medication.   She has done routine arthritis exercises.  Since last visit, patient has been ambulating with occasional left knee antalgic limp, retired and remains active, and doing routine home exercises.  Patient wants to discuss proceeding with elective left total knee replacement as a next step to treat left knee advanced osteoarthritis pain, stiffness and limitations.  She continues with a good recovery after right total knee replacement in 2017.    Past Medical History:   Diagnosis Date    Allergic 1995    seasonal    Arthritis     Celiac disease     Celiac disease     Colon polyp     Diverticulitis     Diverticulitis of colon 10 yrs ago    Diverticulosis 2014    Elevated cholesterol     GERD (gastroesophageal reflux disease)     High cholesterol     History of medical problems Rt leg lesion, squamous cell carcinoma removed    9-    Hypertension     Kidney stone 1980    Osteopenia 2018    osteopenia    SCC (squamous cell carcinoma), leg, right     excision- derm 2021    Sciatica 04/2019        Past Surgical History:   Procedure Laterality Date    APPENDECTOMY  1992    CHOLECYSTECTOMY  1992    COLONOSCOPY  2009    COLONOSCOPY W/ POLYPECTOMY      2019 dr micheal corbett    GALLBLADDER SURGERY   "1991    HAND SURGERY  2015    right middle finger arthritis nodule removed.    DE ARTHRP KNE CONDYLE&PLATU MEDIAL&LAT COMPARTMENTS Right 2017    Procedure: Elective right total knee replacement (BIOMET);  Surgeon: John Dewitt MD;  Location: [facility] [place];  Service: Orthopedics    TOTAL ABDOMINAL HYSTERECTOMY WITH SALPINGO OOPHORECTOMY          Family History   Problem Relation Age of Onset    Osteoarthritis Mother     Heart block Mother     Cancer Mother         breast/     Arthritis Mother     Thyroid disease Mother     Colon cancer Father     Heart block Father     Diabetes Father              Hyperlipidemia Father     Cancer Father         colon/     Heart disease Father     Coronary artery disease Other         Social History     Socioeconomic History    Marital status:    Tobacco Use    Smoking status: Never    Smokeless tobacco: Never   Vaping Use    Vaping Use: Never used   Substance and Sexual Activity    Alcohol use: Never    Drug use: Never    Sexual activity: Defer       Allergies   Allergen Reactions    Gluten Meal GI Intolerance    Wheat Diarrhea    Latex Rash     Tape and adhesive bandages cause rash     Milk-Related Compounds GI Intolerance       Review of Systems   Constitutional:  Negative for fever.   Musculoskeletal:  Positive for arthralgias, gait problem and joint swelling (occasional left knee).   Skin:  Negative for color change and rash.   Neurological:  Negative for weakness.   Psychiatric/Behavioral:  Negative for confusion.        I have reviewed the medical and surgical history, family history, social history, medications, and/or allergies, and the review of systems of this report.    Objective   Temp 97.5 °F (36.4 °C)   Ht 165.1 cm (65\")   Wt 63.2 kg (139 lb 6.4 oz)   BMI 23.20 kg/m²   Physical Exam  Vitals reviewed.   Constitutional:       General: She is not in acute distress.     Appearance: She is well-developed.   Musculoskeletal:      " Right knee: No effusion.   Skin:     General: Skin is warm and dry.      Findings: No erythema or rash.   Neurological:      Mental Status: She is alert.      Gait: Gait is intact.   Psychiatric:         Speech: Speech normal.       Right Knee Exam     Muscle Strength   The patient has normal right knee strength.    Tenderness   The patient is experiencing no tenderness.     Range of Motion   Extension:  0   Flexion:  120     Tests   Varus: negative Valgus: negative  Patellar apprehension: negative    Other   Erythema: absent  Scars: present (well healed)  Pulse: present  Effusion: no effusion present      Left Knee Exam     Muscle Strength   The patient has normal left knee strength.    Tenderness   The patient is experiencing tenderness in the patella, medial retinaculum, medial joint line and lateral retinaculum.    Range of Motion   Extension:  -5   Flexion:  100     Tests   Varus: positive Valgus: negative  Patellar apprehension: negative    Other   Erythema: absent  Pulse: present  Swelling: mild          Neurologic Exam     Mental Status   Attention: normal.   Speech: speech is normal   Level of consciousness: alert  Knowledge: good.     Motor Exam   Overall muscle tone: normal    Gait, Coordination, and Reflexes     Gait  Gait: normal      Assessment & Plan     Independent Review of Radiographic Studies:    No new imaging done today.  Reviewed relevant prior imaging with patient again today.  AP standing of both knees and lateral left knee from 9- shows a well alignd intact right total knee replacement on the AP view.  Also noted left knee advanced tri-compartment varus osteoarthritis with bone-on-bone medial compartment, marginal medial osteophytes and moderate patella compartment osteoarthritis.    Laboratory and Other Studies:  No new results reviewed today.     Medical Decision Making:    Limited progress with persistent and worsening symptoms.   Continue care plan with work-up and treatment as  noted.  Elective surgical treatment of chronic condition.  Medications as prescribed and only as tolerated.  Physical and occupational therapy planned.  Decision for surgery and patient counseling as noted in report.  Activity restrictions as appropriate.    Orthopaedic knee osteoarthritis treatment options reviewed including:  Arthiritis exercises and activity modifications  Knee brace  Medications such as anti-inflammatory, as tolerated, and if no contraindications  Corticosteroid injection  Visco supplementation injections  Elective knee arthroscopy or arthroplasty as appropriate  Orthopaedic surgery limitations and risks reviewed     Procedures    Diagnoses and all orders for this visit:    1. Arthralgia of left knee (Primary)    2. Primary osteoarthritis of left knee    3. History of total knee replacement, right       Discussion of orthopaedic goals and activities and patient expressed appreciation.  Risk, benefits, and merits of treatment alternatives reviewed with the patient and questions answered  Regular exercise as tolerated  Guided on proper techniques for mobility and conditioning exercises    Recommendations/Plan:  Exercise, medications, injections, other patient advice, and return appointment as noted.  Brace: No brace was given at today's visit.  Referral: No referrals made at today's visit.  Test/Studies: No additional studies ordered at this time.  Surgery: Surgery proposed at this visit as noted.  Work/Activity Status: Usual activities, routine exercise as tolerated, light physical work as tolerated, no strenuous activity.    Return in about 2 months (around 2/19/2024) for Pre-op H&P visit, recheck, left knee arthroplasty tentatively scheduled 3/5/24.  Patient is encouraged and agreeable to call or return sooner for any issues or concerns.    Planned Procedure:  Elective left total knee replacement.

## 2023-12-28 NOTE — PRE-PROCEDURE INSTRUCTIONS
PAT phone history completed with pt for upcoming procedure on 1/3/24, with Dr. Smallwood.    PAT PASS GIVEN/REVIEWED WITH PT.  VERBALIZED UNDERSTANDING OF THE FOLLOWING:  DO NOT EAT, DRINK, SMOKE, USE SMOKELESS TOBACCO OR CHEW GUM AFTER MIDNIGHT THE NIGHT BEFORE SURGERY.  THIS ALSO INCLUDES HARD CANDIES AND MINTS.    DO NOT SHAVE THE AREA TO BE OPERATED ON AT LEAST 48 HOURS PRIOR TO THE PROCEDURE.  DO NOT WEAR MAKE UP OR NAIL POLISH.  DO NOT LEAVE IN ANY PIERCING OR WEAR JEWELRY THE DAY OF SURGERY.      DO NOT USE ADHESIVES IF YOU WEAR DENTURES.    DO NOT WEAR EYE CONTACTS; BRING IN YOUR GLASSES.    ONLY TAKE MEDICATION THE MORNING OF YOUR PROCEDURE IF INSTRUCTED BY YOUR SURGEON WITH ENOUGH WATER TO SWALLOW THE MEDICATION.  IF YOUR SURGEON DID NOT SPECIFY WHICH MEDICATIONS TO TAKE, YOU WILL NEED TO CALL THEIR OFFICE FOR FURTHER INSTRUCTIONS AND DO AS THEY INSTRUCT.    LEAVE ANYTHING YOU CONSIDER VALUABLE AT HOME.    YOU WILL NEED TO ARRANGE FOR SOMEONE TO DRIVE YOU HOME AFTER SURGERY.  IT IS RECOMMENDED THAT YOU DO NOT DRIVE, WORK, DRINK ALCOHOL OR MAKE MAJOR DECISIONS FOR AT LEAST 24 HOURS AFTER YOUR PROCEDURE IS COMPLETE.      THE DAY OF YOUR PROCEDURE, BRING IN THE FOLLOWING IF APPLICABLE:   PICTURE ID AND INSURANCE/MEDICARE OR MEDICAID CARDS/ANY CO-PAY THAT MAY BE DUE   COPY OF ADVANCED DIRECTIVE/LIVING WILL/POWER OR    CPAP/BIPAP/INHALERS   SKIN PREP SHEET   YOUR PREADMISSION TESTING PASS (IF NOT A PHONE HISTORY)    Medication instructions given to pt by RN per anesthesia protocol.  Pt referred back to surgeon for further instructions if he/she is on any blood thinners.

## 2024-01-03 ENCOUNTER — ANESTHESIA EVENT (OUTPATIENT)
Dept: GASTROENTEROLOGY | Facility: HOSPITAL | Age: 74
End: 2024-01-03
Payer: MEDICARE

## 2024-01-03 ENCOUNTER — ANESTHESIA (OUTPATIENT)
Dept: GASTROENTEROLOGY | Facility: HOSPITAL | Age: 74
End: 2024-01-03
Payer: MEDICARE

## 2024-01-03 ENCOUNTER — HOSPITAL ENCOUNTER (OUTPATIENT)
Facility: HOSPITAL | Age: 74
Setting detail: HOSPITAL OUTPATIENT SURGERY
Discharge: HOME | End: 2024-01-03
Attending: INTERNAL MEDICINE | Admitting: INTERNAL MEDICINE
Payer: MEDICARE

## 2024-01-03 VITALS
BODY MASS INDEX: 22.49 KG/M2 | HEIGHT: 65 IN | OXYGEN SATURATION: 97 % | SYSTOLIC BLOOD PRESSURE: 124 MMHG | HEART RATE: 62 BPM | WEIGHT: 135 LBS | RESPIRATION RATE: 16 BRPM | DIASTOLIC BLOOD PRESSURE: 77 MMHG | TEMPERATURE: 97 F

## 2024-01-03 DIAGNOSIS — Z86.010 PERSONAL HISTORY OF COLONIC POLYPS: ICD-10-CM

## 2024-01-03 DIAGNOSIS — K90.0 CELIAC DISEASE: ICD-10-CM

## 2024-01-03 PROCEDURE — 25810000003 SODIUM CHLORIDE 0.9 % SOLUTION: Performed by: INTERNAL MEDICINE

## 2024-01-03 RX ORDER — SIMETHICONE 20 MG/.3ML
EMULSION ORAL AS NEEDED
Status: DISCONTINUED | OUTPATIENT
Start: 2024-01-03 | End: 2024-01-03 | Stop reason: HOSPADM

## 2024-01-03 RX ORDER — SODIUM CHLORIDE 9 MG/ML
70 INJECTION, SOLUTION INTRAVENOUS CONTINUOUS PRN
Status: DISCONTINUED | OUTPATIENT
Start: 2024-01-03 | End: 2024-01-03 | Stop reason: HOSPADM

## 2024-01-03 RX ADMIN — SODIUM CHLORIDE 70 ML/HR: 9 INJECTION, SOLUTION INTRAVENOUS at 09:11

## 2024-01-03 NOTE — H&P
[facility]  HISTORY AND PHYSICAL    Patient Name: Sahara Hardwick  : 1950  MRN: [id number]    Chief Complaint:   For  EGD/surveillance colonoscopy    History Of Presenting Illness:    H/o colon polyps   Father had colon Ca  H/o celiac disease    Past Medical History:   Diagnosis Date    Allergic     seasonal    Arthritis     Celiac disease     Celiac disease     Colon polyp     Diverticulitis     Diverticulitis of colon 10 yrs ago    Diverticulosis     Elevated cholesterol     GERD (gastroesophageal reflux disease)     High cholesterol     History of medical problems Rt leg lesion, squamous cell carcinoma removed    2021    Hypertension     Kidney stone     Osteopenia     osteopenia    SCC (squamous cell carcinoma), leg, right     excision- derm     Sciatica 2019       Past Surgical History:   Procedure Laterality Date    APPENDECTOMY      CHOLECYSTECTOMY      COLONOSCOPY      COLONOSCOPY W/ POLYPECTOMY       dr micheal corbett    GALLBLADDER SURGERY      HAND SURGERY      right middle finger arthritis nodule removed.    RI ARTHRP KNE CONDYLE&PLATU MEDIAL&LAT COMPARTMENTS Right 2017    Procedure: Elective right total knee replacement (BIOMET);  Surgeon: John Dewitt MD;  Location: [facility];  Service: Orthopedics    TOTAL ABDOMINAL HYSTERECTOMY WITH SALPINGO OOPHORECTOMY         Social History     Socioeconomic History    Marital status:    Tobacco Use    Smoking status: Never    Smokeless tobacco: Never   Vaping Use    Vaping Use: Never used   Substance and Sexual Activity    Alcohol use: Never    Drug use: Never    Sexual activity: Defer       Family History   Problem Relation Age of Onset    Osteoarthritis Mother     Heart block Mother     Cancer Mother         breast/     Arthritis Mother     Thyroid disease Mother     Colon cancer Father     Heart block Father     Diabetes Father               Hyperlipidemia Father     Cancer Father         colon/     Heart disease Father     Coronary artery disease Other        Prior to Admission Medications:  Medications Prior to Admission   Medication Sig Dispense Refill Last Dose    acetaminophen (TYLENOL) 650 MG 8 hr tablet Take 1 tablet by mouth Every 8 (Eight) Hours As Needed for Mild Pain.   Past Week    Cholecalciferol (Vitamin D3) 50 MCG (2000) tablet Take 1 tablet by mouth Daily.   2024 at 0700    Cimetidine (TAGAMET PO) Take  by mouth Daily As Needed.   2024 at 0700    lisinopril-hydrochlorothiazide (PRINZIDE,ZESTORETIC) 20-12.5 MG per tablet Take 0.5 tablets by mouth Daily. 45 tablet 2 2024 at 1800    Multiple Vitamins-Minerals (MULTIVITAMIN ADULT PO) Take 1 tablet by mouth Daily.   2024 at 0700    rosuvastatin (Crestor) 10 MG tablet Take 1 tablet by mouth Every Night. 90 tablet 2 2024 at 1800    tacrolimus (PROGRAF) 1 MG capsule Apply 1 capsule to the mouth or throat Take As Directed. Empty contents of 1 capsule into 1 L distilled water; Once dissolved, swish and spit up to 4 times a day.   Past Week       Allergies:  Allergies   Allergen Reactions    Gluten Meal GI Intolerance    Wheat Diarrhea    Latex Rash     Tape and adhesive bandages cause rash     Milk-Related Compounds GI Intolerance        Vitals: Temp:  [97.1 °F (36.2 °C)] 97.1 °F (36.2 °C)  Heart Rate:  [97] 97  Resp:  [16] 16  BP: (131)/(90) 131/90    Review Of Systems:  Constitutional:  Negative for chills, fever, and unexpected weight change.  Respiratory:  Negative for cough, chest tightness, shortness of breath, and wheezing.  Cardiovascular:  Negative for chest pain, palpitations, and leg swelling.  Gastrointestinal:  Negative for abdominal distention, abdominal pain, nausea, vomiting.  Neurological:  Negative for weakness, numbness, and headaches.     Physical Exam:    General Appearance:  Alert, cooperative, in no acute distress.   Lungs:   Clear to  auscultation, respirations regular, even and                 unlabored.   Heart:  Regular rhythm and normal rate.   Abdomen:   Normal bowel sounds, no masses, no organomegaly. Soft, nontender, nondistended   Neurologic: Alert and oriented x 3. Moves all four limbs equally       Assessment & Plan     Assessment:  Active Problems:    Celiac disease    Personal history of colonic polyps      Plan: ESOPHAGOGASTRODUODENOSCOPY (N/A), COLONOSCOPY (N/A)     Nakia Smallwood MD  1/3/2024

## 2024-01-03 NOTE — DISCHARGE INSTRUCTIONS
- Discharge patient to home (ambulatory).   - Gluten free diet.   - Continue present medications.   - Await pathology results.   - Repeat upper endoscopy in 3 years for surveillance. \  - Repeat colonoscopy in 5 years for surveillance and due to family history of colon Cancer.   - Return to GI office in 8 weeks.    No pushing, pulling, tugging,  heavy lifting, or strenuous activity.  No major decision making, driving, or drinking alcoholic beverages for 24 hours. ( due to the medications you have  received)  Always use good hand hygiene/washing techniques.  NO driving while taking pain medications.    * if you have an incision:  Check your incision area every day for signs of infection.   Check for:  * more redness, swelling, or pain  *more fluid or blood  *warmth  *pus or bad smell    To assist you in voiding:  Drink plenty of fluids  Listen to running water while attempting to void.    If you are unable to urinate and you have an uncomfortable urge to void or it has been   6 hours since you were discharged, return to the Emergency Room

## 2024-01-03 NOTE — ANESTHESIA POSTPROCEDURE EVALUATION
Patient: Sahara Hardwick    Procedure Summary       Date: 01/03/24 Room / Location: [facility] ENDOSCOPY 2 / [facility] ENDOSCOPY    Anesthesia Start: 1051 Anesthesia Stop: 1131    Procedures:       ESOPHAGOGASTRODUODENOSCOPY with biopsy (Esophagus)      COLONOSCOPY with biopsy and polypectomy (Anus) Diagnosis:       Celiac disease      Personal history of colonic polyps      (Celiac disease [K90.0])      (Personal history of colonic polyps [Z86.010])    Surgeons: Nakia Smallwood MD Provider: Martínez Rollins CRNA    Anesthesia Type: MAC ASA Status: 3            Anesthesia Type: MAC    Vitals  No vitals data found for the desired time range.          Post Anesthesia Care and Evaluation    Patient location during evaluation: bedside  Patient participation: complete - patient participated  Level of consciousness: awake and alert  Pain score: 0  Pain management: adequate    Airway patency: patent  Anesthetic complications: No anesthetic complications  PONV Status: none  Cardiovascular status: acceptable  Respiratory status: acceptable  Hydration status: acceptable

## 2024-01-03 NOTE — ANESTHESIA PREPROCEDURE EVALUATION
Anesthesia Evaluation     Patient summary reviewed and Nursing notes reviewed   no history of anesthetic complications:   NPO Solid Status: > 8 hours  NPO Liquid Status: > 2 hours           Airway   Mallampati: II  TM distance: >3 FB  Neck ROM: full  no difficulty expected  Dental - normal exam     Pulmonary - negative pulmonary ROS and normal exam    breath sounds clear to auscultation  (-) not a smoker  Cardiovascular - normal exam  Exercise tolerance: good (4-7 METS)    ECG reviewed  Rhythm: regular  Rate: normal    (+) hypertension well controlled, hyperlipidemia      Neuro/Psych- negative ROS  GI/Hepatic/Renal/Endo    (+) GERD well controlled, renal disease- stones    Musculoskeletal     Abdominal    Substance History - negative use     OB/GYN negative ob/gyn ROS         Other   arthritis,   history of cancer                  Anesthesia Plan    ASA 3     MAC     (Risks and benefits discussed including risk of aspiration, recall and dental damage. All patient questions answered.    Will continue with plan of care.)  intravenous induction     Anesthetic plan, risks, benefits, and alternatives have been provided, discussed and informed consent has been obtained with: patient.  Pre-procedure education provided  Plan discussed with CRNA.

## 2024-01-04 LAB — REF LAB TEST METHOD: NORMAL

## 2024-01-17 ENCOUNTER — TELEPHONE (OUTPATIENT)
Dept: ORTHOPEDIC SURGERY | Facility: CLINIC | Age: 74
End: 2024-01-17
Payer: MEDICARE

## 2024-01-17 NOTE — TELEPHONE ENCOUNTER
Spoke with patient and confirmed surgery date and let her know I would schedule all appointments and call with that information.

## 2024-01-19 ENCOUNTER — PREP FOR SURGERY (OUTPATIENT)
Dept: OTHER | Facility: HOSPITAL | Age: 74
End: 2024-01-19
Payer: MEDICARE

## 2024-01-19 DIAGNOSIS — M25.562 ARTHRALGIA OF LEFT KNEE: Primary | ICD-10-CM

## 2024-01-19 DIAGNOSIS — M17.12 PRIMARY OSTEOARTHRITIS OF LEFT KNEE: ICD-10-CM

## 2024-01-19 RX ORDER — CEFAZOLIN SODIUM 2 G/50ML
2 SOLUTION INTRAVENOUS
OUTPATIENT
Start: 2024-01-20 | End: 2024-01-21

## 2024-01-19 RX ORDER — TRANEXAMIC ACID 10 MG/ML
1000 INJECTION, SOLUTION INTRAVENOUS
OUTPATIENT
Start: 2024-01-20 | End: 2024-01-21

## 2024-01-19 RX ORDER — FAMOTIDINE 10 MG/ML
20 INJECTION, SOLUTION INTRAVENOUS
OUTPATIENT
Start: 2024-01-20 | End: 2024-01-21

## 2024-01-31 ENCOUNTER — OFFICE VISIT (OUTPATIENT)
Dept: GASTROENTEROLOGY | Facility: CLINIC | Age: 74
End: 2024-01-31
Payer: MEDICARE

## 2024-01-31 VITALS
WEIGHT: 140 LBS | HEIGHT: 65 IN | OXYGEN SATURATION: 99 % | RESPIRATION RATE: 14 BRPM | SYSTOLIC BLOOD PRESSURE: 134 MMHG | BODY MASS INDEX: 23.32 KG/M2 | HEART RATE: 73 BPM | DIASTOLIC BLOOD PRESSURE: 90 MMHG

## 2024-01-31 DIAGNOSIS — K21.9 GASTROESOPHAGEAL REFLUX DISEASE WITHOUT ESOPHAGITIS: Chronic | ICD-10-CM

## 2024-01-31 DIAGNOSIS — R19.4 CHANGE IN BOWEL HABITS: Chronic | ICD-10-CM

## 2024-01-31 DIAGNOSIS — D12.2 ADENOMATOUS POLYP OF ASCENDING COLON: ICD-10-CM

## 2024-01-31 DIAGNOSIS — Z80.0 FAMILY HISTORY OF COLON CANCER: ICD-10-CM

## 2024-01-31 DIAGNOSIS — R10.32 LEFT LOWER QUADRANT ABDOMINAL PAIN: Chronic | ICD-10-CM

## 2024-01-31 DIAGNOSIS — K90.0 CELIAC DISEASE: Primary | Chronic | ICD-10-CM

## 2024-01-31 PROCEDURE — 1160F RVW MEDS BY RX/DR IN RCRD: CPT | Performed by: NURSE PRACTITIONER

## 2024-01-31 PROCEDURE — 1159F MED LIST DOCD IN RCRD: CPT | Performed by: NURSE PRACTITIONER

## 2024-01-31 PROCEDURE — 3080F DIAST BP >= 90 MM HG: CPT | Performed by: NURSE PRACTITIONER

## 2024-01-31 PROCEDURE — 3075F SYST BP GE 130 - 139MM HG: CPT | Performed by: NURSE PRACTITIONER

## 2024-01-31 PROCEDURE — 99214 OFFICE O/P EST MOD 30 MIN: CPT | Performed by: NURSE PRACTITIONER

## 2024-01-31 NOTE — PROGRESS NOTES
Follow Up Note     Date: 2024   Patient Name: Sahara Hardwick  MRN: [id number]  : 1950     Primary Care Provider: Shona Horn MD     Chief Complaint   Patient presents with    Follow-up     After procedures     History of present illness:   2024  Sahara Hardwick is a 73 y.o. female who is here today for follow up after procedures. She has been doing well, no concerns or complaints today. No significant problem with abdominal pain at this time. Bowel habits are regular, no diarrhea or constipation. Denies any GI bleeding. She takes Tagamet as needed with reasonable control of reflux. No difficulty swallowing. No nausea or vomiting.     Interval History:  2023  Patient states that she was diagnosed with celiac disease in 2019 after her EGD in May 2019.  EGD showed a villous blunting in the duodenum.  Subsequently had a serology done for celiac disease which revealed a borderline elevated deamidated gliadin antibodies IgA 21 normal being 0-19.  Also had a borderline elevated IgA TTG 5 normal being 0-3.  She has been following gluten-free diet for most part.  She did have a significant issue with the diarrhea at that time she was given rifaximin for 3 weeks that helped on that.  She also has a intermittent left-sided abdominal pain now without any significant diarrhea.  She will have 1 or 2 soft to firm bowel movement daily.  Colonoscopy done in  and had a 2 polyps removed advised to repeat colonoscopy in 3 to 5 years time.  She does get issue with certain foods especially spicy food with the diarrhea.     Her father had a colon cancer at the age of 60s.  She denies any reflux symptoms no dysphagia.  She is a non-smoker nonalcoholic.    Subjective      Past Medical History:   Diagnosis Date    Adenomatous colon polyp 2024    Allergic     seasonal    Arthritis     Celiac disease     Celiac disease     Colon polyp     Diverticulitis     Diverticulitis of colon 10 yrs ago     Diverticulosis     Elevated cholesterol     GERD (gastroesophageal reflux disease)     High cholesterol     History of medical problems Rt leg lesion, squamous cell carcinoma removed    2021    Hypertension     Kidney stone     Osteopenia     osteopenia    SCC (squamous cell carcinoma), leg, right     excision- derm     Sciatica 2019     Past Surgical History:   Procedure Laterality Date    APPENDECTOMY      CHOLECYSTECTOMY      COLONOSCOPY      COLONOSCOPY N/A 1/3/2024    Procedure: COLONOSCOPY with biopsy and polypectomy;  Surgeon: Nakia Smallwood MD;  Location: [facility] ENDOSCOPY;  Service: Gastroenterology;  Laterality: N/A;    COLONOSCOPY W/ POLYPECTOMY       dr micheal corbett    ENDOSCOPY N/A 1/3/2024    Procedure: ESOPHAGOGASTRODUODENOSCOPY with biopsy;  Surgeon: Nakia Smallwood MD;  Location: [facility] ENDOSCOPY;  Service: Gastroenterology;  Laterality: N/A;    GALLBLADDER SURGERY      HAND SURGERY      right middle finger arthritis nodule removed.    MA ARTHRP KNE CONDYLE&PLATU MEDIAL&LAT COMPARTMENTS Right 2017    Procedure: Elective right total knee replacement (BIOMET);  Surgeon: John Dewitt MD;  Location: [facility] OR;  Service: Orthopedics    TOTAL ABDOMINAL HYSTERECTOMY WITH SALPINGO OOPHORECTOMY       Family History   Problem Relation Age of Onset    Osteoarthritis Mother     Heart block Mother     Cancer Mother         breast/     Arthritis Mother     Thyroid disease Mother     Colon cancer Father         Diagnosed in his 60's    Heart block Father     Diabetes Father              Hyperlipidemia Father     Cancer Father         colon/     Heart disease Father     Coronary artery disease Other      Social History     Socioeconomic History    Marital status:    Tobacco Use    Smoking status: Never    Smokeless tobacco: Never   Vaping Use    Vaping Use: Never used   Substance and Sexual Activity     Alcohol use: Never    Drug use: Never    Sexual activity: Defer       Current Outpatient Medications:     acetaminophen (TYLENOL) 650 MG 8 hr tablet, Take 1 tablet by mouth Every 8 (Eight) Hours As Needed for Mild Pain., Disp: , Rfl:     Cholecalciferol (Vitamin D3) 50 MCG (2000 UT) tablet, Take 1 tablet by mouth Daily., Disp: , Rfl:     Cimetidine (TAGAMET PO), Take  by mouth Daily As Needed., Disp: , Rfl:     lisinopril-hydrochlorothiazide (PRINZIDE,ZESTORETIC) 20-12.5 MG per tablet, Take 0.5 tablets by mouth Daily., Disp: 45 tablet, Rfl: 2    Multiple Vitamins-Minerals (MULTIVITAMIN ADULT PO), Take 1 tablet by mouth Daily., Disp: , Rfl:     rosuvastatin (Crestor) 10 MG tablet, Take 1 tablet by mouth Every Night., Disp: 90 tablet, Rfl: 2    tacrolimus (PROGRAF) 1 MG capsule, Apply 1 capsule to the mouth or throat Take As Directed. Empty contents of 1 capsule into 1 L distilled water; Once dissolved, swish and spit up to 4 times a day., Disp: , Rfl:     Allergies   Allergen Reactions    Gluten Meal GI Intolerance    Wheat Diarrhea    Latex Rash     Tape and adhesive bandages cause rash     Milk-Related Compounds GI Intolerance     The following portions of the patient's history were reviewed and updated as appropriate: allergies, current medications, past family history, past medical history, past social history, past surgical history and problem list.    Objective     Physical Exam  Vitals and nursing note reviewed.   Constitutional:       General: She is not in acute distress.     Appearance: Normal appearance. She is well-developed.   HENT:      Head: Normocephalic and atraumatic.      Mouth/Throat:      Mouth: Mucous membranes are not pale, not dry and not cyanotic.   Eyes:      General: Lids are normal.   Neck:      Trachea: Trachea normal.   Cardiovascular:      Rate and Rhythm: Normal rate.   Pulmonary:      Effort: Pulmonary effort is normal. No respiratory distress.      Breath sounds: Normal breath  "sounds.   Abdominal:      Tenderness: There is no abdominal tenderness.   Skin:     General: Skin is warm and dry.   Neurological:      Mental Status: She is alert and oriented to person, place, and time.   Psychiatric:         Mood and Affect: Mood normal.         Speech: Speech normal.         Behavior: Behavior normal. Behavior is cooperative.       Vitals:    24 1104   BP: 134/90   Pulse: 73   Resp: 14   SpO2: 99%   Weight: 63.5 kg (140 lb)   Height: 165.1 cm (65\")     Body mass index is 23.3 kg/m².     Results Review:   I reviewed the patient's new clinical results.    Admission on 2024, Discharged on 2024   Component Date Value Ref Range Status    Reference Lab Report 2024    Final                    Value:[facility]  [address] [place] [address]  Phone: [phone] or [phone]  Fax: [phone]  Ramón Jackson M.D., Medical Director    PATIENT NAME                                     LABORATORY NO.  THI WADDELL.                               [id number]  [id number]                                 AGE                    SEX   SSN              CLIENT REF #  [facility]                    73        1950      F     [id number]      [id number]    [address] [place] [address] BY-PASS                        REQUESTING M.D.           ATTENDING MXUAN.         COPY TO.   [address]                                FREDI MERA MARIETTA  [place] [id number]                         [facility]  DATE COLLECTED            DATE RECEIVED          DATE REPORTED  2024    DIAGNOSIS:  A.     DUODENUM, BIOPSY:  Duodenal mucosa with no significant histopathologic abnormality  No                           evidence of dysplasia, carcinoma, or villous atrophy    B.     ANTRUM AND BODY, BIOPSY:  Gastric mucosa with no significant histopathologic abnormality  No H. pylori organisms " identified on routine stains  Negative for intestinal metaplasia, dysplasia, or carcinoma    C.     ESOPHAGUS, BIOPSY, PROXIMAL:  Columnar mucosa with mild nonspecific chronic inflammation  Negative for intestinal phase, dysplasia, carcinoma    D.     RANDOM COLON BIOPSY, RIGHT, LEFT AND  TRANSVERSE:  Colonic mucosa with no significant histopathologic abnormality  No evidence of dysplasia, carcinoma, or microscopic colitis    E.     ASCENDING COLON POLYP:  Fragments of tubular adenoma  Negative for carcinoma or high-grade dysplasia                          REVIEWED, DIAGNOSED AND ELECTRONICALLY  SIGNED BY:    Donovan Carreon M.D.,F.C.A.P.  CPT CODES:  88305x5     Lab on 11/14/2023   Component Date Value Ref Range Status    IgA 11/14/2023 134  70 - 400 mg/dL Final    Tissue Transglutaminase IgA 11/14/2023 <2  0 - 3 U/mL Final                                  Negative        0 -  3                                Weak Positive   4 - 10                                Positive           >10   Tissue Transglutaminase (tTG) has been identified   as the endomysial antigen.  Studies have demonstr-   ated that endomysial IgA antibodies have over 99%   specificity for gluten sensitive enteropathy.      EGD dated 1/3/2024 per Dr. Smallwood  - Normal oropharynx.  - Z-line regular, 35 cm from the incisors.  - 2 cm hiatal hernia.  - Non-obstructing and mild Schatzki ring.  - Ectopic gastric mucosa in the upper third of the esophagus. Biopsied.  - Erythematous mucosa in the prepyloric region of the stomach. Biopsied.  - Normal duodenal bulb, first portion of the duodenum, second portion of the duodenum and third portion of the duodenum. Biopsied.  - No convincing endoscopic findings of celiac disease.  A.     DUODENUM, BIOPSY:  Duodenal mucosa with no significant histopathologic abnormality  No evidence of dysplasia, carcinoma, or villous atrophy  B.     ANTRUM AND BODY, BIOPSY:  Gastric mucosa with no significant histopathologic  abnormality  No H. pylori organisms identified on routine stains  Negative for intestinal metaplasia, dysplasia, or carcinoma  C.     ESOPHAGUS, BIOPSY, PROXIMAL:  Columnar mucosa with mild nonspecific chronic inflammation  Negative for intestinal phase, dysplasia, carcinoma     Colonoscopy dated 1/3/2024 per Dr. Smallwood  - Diverticulosis in the rectum, in the sigmoid colon, in the descending colon, in the transverse colon and in the ascending colon.  - One 5 mm polyp in the mid ascending colon, removed with a cold snare. Resected and retrieved.  - Non-bleeding external and internal hemorrhoids.  - Biopsies were taken with a cold forceps from the right colon, left colon and transverse colon for evaluation of microscopic colitis.  D.     RANDOM COLON BIOPSY, RIGHT, LEFT AND  TRANSVERSE:   Colonic mucosa with no significant histopathologic abnormality   No evidence of dysplasia, carcinoma, or microscopic colitis   E.     ASCENDING COLON POLYP:   Fragments of tubular adenoma   Negative for carcinoma or high-grade dysplasia      Assessment / Plan      1. Celiac disease  She is not having any trouble at this time.  She continues to follow a gluten-free diet. She was diagnosed with a celiac disease in 2019 with a serology and EGD and duodenal biopsies. Lab work done in 2019 revealed a borderline elevated deamidated gliadin antibodies IgA 21 normal being 0-19.  Also had a borderline elevated IgA TTG 5 normal being 0-3. EGD with a duodenal biopsy revealed villous blunting.  Celiac panel normal on 11/14/2023.  EGD dated 1/3/2024 unremarkable, no convincing endoscopic findings of celiac.  Duodenum biopsies unremarkable.  Continue gluten-free diet.  EGD for surveillance in 3 years, January 2027.    2. Left lower quadrant abdominal pain  3. Change in bowel habits  She is not having any significant trouble with abdominal pain or loose stools at this time.  Denies any rectal bleeding.  Colonoscopy dated 1/3/2024 with 1 polyp  removed, otherwise unremarkable.  Random colon biopsies unremarkable.  Suspect underlying IBS.  Continue to avoid gluten.  Low FODMAP diet.  CTAP if symptoms persist.    4. Gastroesophageal reflux disease without esophagitis  She is taking Tagamet over-the-counter as needed with reasonable control.  Continue same.  No difficulty swallowing.  EGD dated 1/3/2024 unremarkable, no Puri's.  Antireflux measures.    5. Adenomatous polyp of ascending colon  6. Family history of colon cancer  Colonoscopy dated 1/3/2024 with 1 polyp removed, fragments of tubular adenoma, no dysplasia.  Her father had colon cancer in his 60s.  Colonoscopy for high risk surveillance in 5 years, January 2029, per current ACG guidelines.     Patient Instructions   Antireflux measures: Avoid fried, fatty foods, alcohol, chocolate, coffee, tea,  soft drinks, all carbonated beverages (including sparkling water), peppermint and spearmint, spicy foods, tomatoes and tomato based foods, onion based foods, and garlic.   Other antireflux measures include weight reduction if overweight, avoiding tight clothing around the abdomen, elevating the head of the bed 6 inches with blocks under the head board, and don't drink or eat before going to bed and avoid lying down immediately after meals.  Continue Tagamet as needed for reflux.   High fiber, low fat diet with liberal water intake.  Continue to avoid gluten.   Low FODMAP diet - avoid all dairy. May use lactose free/dairy free alternatives such as almond milk, rice milk, oat milk, etc.   Repeat EGD in 3 years for surveillance of celiac, 2027.  Repeat colonoscopy for high risk surveillance in 5 years, 2029.  Follow up: The patient wants to call back      Low-FODMAP Eating Plan    FODMAP stands for fermentable oligosaccharides, disaccharides, monosaccharides, and polyols. These are sugars that are hard for some people to digest. A low-FODMAP eating plan may help some people who have irritable bowel syndrome  (IBS) and certain other bowel (intestinal) diseases to manage their symptoms.  This meal plan can be complicated to follow. Work with a diet and nutrition specialist (dietitian) to make a low-FODMAP eating plan that is right for you. A dietitian can help make sure that you get enough nutrition from this diet.  What are tips for following this plan?  Reading food labels  Check labels for hidden FODMAPs such as:  High-fructose syrup.  Honey.  Agave.  Natural fruit flavors.  Onion or garlic powder.  Choose low-FODMAP foods that contain 3-4 grams of fiber per serving.  Check food labels for serving sizes. Eat only one serving at a time to make sure FODMAP levels stay low.  Shopping  Shop with a list of foods that are recommended on this diet and make a meal plan.  Meal planning  Follow a low-FODMAP eating plan for up to 6 weeks, or as told by your health care provider or dietitian.  To follow the eating plan:  Eliminate high-FODMAP foods from your diet completely. Choose only low-FODMAP foods to eat. You will do this for 2-6 weeks.  Gradually reintroduce high-FODMAP foods into your diet one at a time. Most people should wait a few days before introducing the next new high-FODMAP food into their meal plan. Your dietitian can recommend how quickly you may reintroduce foods.  Keep a daily record of what and how much you eat and drink. Make note of any symptoms that you have after eating.  Review your daily record with a dietitian regularly to identify which foods you can eat and which foods you should avoid.  General tips  Drink enough fluid each day to keep your urine pale yellow.  Avoid processed foods. These often have added sugar and may be high in FODMAPs.  Avoid most dairy products, whole grains, and sweeteners.  Work with a dietitian to make sure you get enough fiber in your diet.  Avoid high FODMAP foods at meals to manage symptoms.    Recommended foods  Fruits  Bananas, oranges, tangerines, erma, limes,  "blueberries, raspberries, strawberries, grapes, cantaloupe, honeydew melon, kiwi, papaya, passion fruit, and pineapple. Limited amounts of dried cranberries, banana chips, and shredded coconut.  Vegetables  Eggplant, zucchini, cucumber, peppers, green beans, bean sprouts, lettuce, arugula, kale, Swiss chard, spinach, ankit greens, bok rudy, summer squash, potato, and tomato. Limited amounts of corn, carrot, and sweet potato. Green parts of scallions.  Grains  Gluten-free grains, such as rice, oats, buckwheat, quinoa, corn, polenta, and millet. Gluten-free pasta, bread, or cereal. Rice noodles. Corn tortillas.  Meats and other proteins  Unseasoned beef, pork, poultry, or fish. Eggs. Longo. Tofu (firm) and tempeh. Limited amounts of nuts and seeds, such as almonds, walnuts, brazil nuts, pecans, peanuts, nut butters, pumpkin seeds, adilene seeds, and sunflower seeds.  Dairy  Lactose-free milk, yogurt, and kefir. Lactose-free cottage cheese and ice cream. Non-dairy milks, such as almond, coconut, hemp, and rice milk. Non-dairy yogurt. Limited amounts of goat cheese, brie, mozzarella, parmesan, swiss, and other hard cheeses.  Fats and oils  Butter-free spreads. Vegetable oils, such as olive, canola, and sunflower oil.  Seasoning and other foods  Artificial sweeteners with names that do not end in \"ol,\" such as aspartame, saccharine, and stevia. Maple syrup, white table sugar, raw sugar, brown sugar, and molasses. Mayonnaise, soy sauce, and tamari. Fresh basil, coriander, parsley, rosemary, and thyme.  Beverages  Water and mineral water. Sugar-sweetened soft drinks. Small amounts of orange juice or cranberry juice. Black and green tea. Most dry caroline. Coffee.  The items listed above may not be a complete list of foods and beverages you can eat. Contact a dietitian for more information.    Foods to avoid  Fruits  Fresh, dried, and juiced forms of apple, pear, watermelon, peach, plum, cherries, apricots, blackberries, " boysenberries, figs, nectarines, and vipin. Avocado.  Vegetables  Chicory root, artichoke, asparagus, cabbage, snow peas, [place] sprouts, broccoli, sugar snap peas, mushrooms, celery, and cauliflower. Onions, garlic, leeks, and the white part of scallions.  Grains  Wheat, including kamut, durum, and semolina. Barley and bulgur. Couscous. Wheat-based cereals. Wheat noodles, bread, crackers, and pastries.  Meats and other proteins  Fried or fatty meat. Sausage. Cashews and pistachios. Soybeans, baked beans, black beans, chickpeas, kidney beans, ko beans, navy beans, lentils, black-eyed peas, and split peas.  Dairy  Milk, yogurt, ice cream, and soft cheese. Cream and sour cream. Milk-based sauces. Custard. Buttermilk. Soy milk.  Seasoning and other foods  Any sugar-free gum or candy. Foods that contain artificial sweeteners such as sorbitol, mannitol, isomalt, or xylitol. Foods that contain honey, high-fructose corn syrup, or agave. Bouillon, vegetable stock, beef stock, and chicken stock. Garlic and onion powder. Condiments made with onion, such as hummus, chutney, pickles, relish, salad dressing, and salsa. Tomato paste.  Beverages  Chicory-based drinks. Coffee substitutes. Chamomile tea. Fennel tea. Sweet or fortified caroline such as port or harshad. Diet soft drinks made with isomalt, mannitol, maltitol, sorbitol, or xylitol. Apple, pear, and vipin juice. Juices with high-fructose corn syrup.  The items listed above may not be a complete list of foods and beverages you should avoid. Contact a dietitian for more information.    Summary  FODMAP stands for fermentable oligosaccharides, disaccharides, monosaccharides, and polyols. These are sugars that are hard for some people to digest.  A low-FODMAP eating plan is a short-term diet that helps to ease symptoms of certain bowel diseases.  The eating plan usually lasts up to 6 weeks. After that, high-FODMAP foods are reintroduced gradually and one at a time. This can  help you find out which foods may be causing symptoms.  A low-FODMAP eating plan can be complicated. It is best to work with a dietitian who has experience with this type of plan.  This information is not intended to replace advice given to you by your health care provider. Make sure you discuss any questions you have with your health care provider.  Document Revised: 05/06/2021 Document Reviewed: 05/06/2021  [employer] Patient Education © 2023 [employer]    [place], APRN  1/31/2024    Please note that portions of this note were completed with a voice recognition program.

## 2024-01-31 NOTE — PATIENT INSTRUCTIONS
Antireflux measures: Avoid fried, fatty foods, alcohol, chocolate, coffee, tea,  soft drinks, all carbonated beverages (including sparkling water), peppermint and spearmint, spicy foods, tomatoes and tomato based foods, onion based foods, and garlic.   Other antireflux measures include weight reduction if overweight, avoiding tight clothing around the abdomen, elevating the head of the bed 6 inches with blocks under the head board, and don't drink or eat before going to bed and avoid lying down immediately after meals.  Continue Tagamet as needed for reflux.   High fiber, low fat diet with liberal water intake.  Continue to avoid gluten.   Low FODMAP diet - avoid all dairy. May use lactose free/dairy free alternatives such as almond milk, rice milk, oat milk, etc.   Repeat EGD in 3 years for surveillance of celiac, 2027.  Repeat colonoscopy for high risk surveillance in 5 years, 2029.  Follow up: The patient wants to call back      Low-FODMAP Eating Plan    FODMAP stands for fermentable oligosaccharides, disaccharides, monosaccharides, and polyols. These are sugars that are hard for some people to digest. A low-FODMAP eating plan may help some people who have irritable bowel syndrome (IBS) and certain other bowel (intestinal) diseases to manage their symptoms.  This meal plan can be complicated to follow. Work with a diet and nutrition specialist (dietitian) to make a low-FODMAP eating plan that is right for you. A dietitian can help make sure that you get enough nutrition from this diet.  What are tips for following this plan?  Reading food labels  Check labels for hidden FODMAPs such as:  High-fructose syrup.  Honey.  Agave.  Natural fruit flavors.  Onion or garlic powder.  Choose low-FODMAP foods that contain 3-4 grams of fiber per serving.  Check food labels for serving sizes. Eat only one serving at a time to make sure FODMAP levels stay low.  Shopping  Shop with a list of foods that are recommended on this  diet and make a meal plan.  Meal planning  Follow a low-FODMAP eating plan for up to 6 weeks, or as told by your health care provider or dietitian.  To follow the eating plan:  Eliminate high-FODMAP foods from your diet completely. Choose only low-FODMAP foods to eat. You will do this for 2-6 weeks.  Gradually reintroduce high-FODMAP foods into your diet one at a time. Most people should wait a few days before introducing the next new high-FODMAP food into their meal plan. Your dietitian can recommend how quickly you may reintroduce foods.  Keep a daily record of what and how much you eat and drink. Make note of any symptoms that you have after eating.  Review your daily record with a dietitian regularly to identify which foods you can eat and which foods you should avoid.  General tips  Drink enough fluid each day to keep your urine pale yellow.  Avoid processed foods. These often have added sugar and may be high in FODMAPs.  Avoid most dairy products, whole grains, and sweeteners.  Work with a dietitian to make sure you get enough fiber in your diet.  Avoid high FODMAP foods at meals to manage symptoms.    Recommended foods  Fruits  Bananas, oranges, tangerines, erma, limes, blueberries, raspberries, strawberries, grapes, cantaloupe, honeydew melon, kiwi, papaya, passion fruit, and pineapple. Limited amounts of dried cranberries, banana chips, and shredded coconut.  Vegetables  Eggplant, zucchini, cucumber, peppers, green beans, bean sprouts, lettuce, arugula, kale, Swiss chard, spinach, ankit greens, bok rudy, summer squash, potato, and tomato. Limited amounts of corn, carrot, and sweet potato. Green parts of scallions.  Grains  Gluten-free grains, such as rice, oats, buckwheat, quinoa, corn, polenta, and millet. Gluten-free pasta, bread, or cereal. Rice noodles. Corn tortillas.  Meats and other proteins  Unseasoned beef, pork, poultry, or fish. Eggs. Longo. Tofu (firm) and tempeh. Limited amounts of nuts and  "seeds, such as almonds, walnuts, brazil nuts, pecans, peanuts, nut butters, pumpkin seeds, adilene seeds, and sunflower seeds.  Dairy  Lactose-free milk, yogurt, and kefir. Lactose-free cottage cheese and ice cream. Non-dairy milks, such as almond, coconut, hemp, and rice milk. Non-dairy yogurt. Limited amounts of goat cheese, brie, mozzarella, parmesan, swiss, and other hard cheeses.  Fats and oils  Butter-free spreads. Vegetable oils, such as olive, canola, and sunflower oil.  Seasoning and other foods  Artificial sweeteners with names that do not end in \"ol,\" such as aspartame, saccharine, and stevia. Maple syrup, white table sugar, raw sugar, brown sugar, and molasses. Mayonnaise, soy sauce, and tamari. Fresh basil, coriander, parsley, rosemary, and thyme.  Beverages  Water and mineral water. Sugar-sweetened soft drinks. Small amounts of orange juice or cranberry juice. Black and green tea. Most dry caroline. Coffee.  The items listed above may not be a complete list of foods and beverages you can eat. Contact a dietitian for more information.    Foods to avoid  Fruits  Fresh, dried, and juiced forms of apple, pear, watermelon, peach, plum, cherries, apricots, blackberries, boysenberries, figs, nectarines, and vipin. Avocado.  Vegetables  Chicory root, artichoke, asparagus, cabbage, snow peas, [place] sprouts, broccoli, sugar snap peas, mushrooms, celery, and cauliflower. Onions, garlic, leeks, and the white part of scallions.  Grains  Wheat, including kamut, durum, and semolina. Barley and bulgur. Couscous. Wheat-based cereals. Wheat noodles, bread, crackers, and pastries.  Meats and other proteins  Fried or fatty meat. Sausage. Cashews and pistachios. Soybeans, baked beans, black beans, chickpeas, kidney beans, ko beans, navy beans, lentils, black-eyed peas, and split peas.  Dairy  Milk, yogurt, ice cream, and soft cheese. Cream and sour cream. Milk-based sauces. Custard. Buttermilk. Soy milk.  Seasoning and " other foods  Any sugar-free gum or candy. Foods that contain artificial sweeteners such as sorbitol, mannitol, isomalt, or xylitol. Foods that contain honey, high-fructose corn syrup, or agave. Bouillon, vegetable stock, beef stock, and chicken stock. Garlic and onion powder. Condiments made with onion, such as hummus, chutney, pickles, relish, salad dressing, and salsa. Tomato paste.  Beverages  Chicory-based drinks. Coffee substitutes. Chamomile tea. Fennel tea. Sweet or fortified caroline such as port or harshad. Diet soft drinks made with isomalt, mannitol, maltitol, sorbitol, or xylitol. Apple, pear, and vipin juice. Juices with high-fructose corn syrup.  The items listed above may not be a complete list of foods and beverages you should avoid. Contact a dietitian for more information.    Summary  FODMAP stands for fermentable oligosaccharides, disaccharides, monosaccharides, and polyols. These are sugars that are hard for some people to digest.  A low-FODMAP eating plan is a short-term diet that helps to ease symptoms of certain bowel diseases.  The eating plan usually lasts up to 6 weeks. After that, high-FODMAP foods are reintroduced gradually and one at a time. This can help you find out which foods may be causing symptoms.  A low-FODMAP eating plan can be complicated. It is best to work with a dietitian who has experience with this type of plan.  This information is not intended to replace advice given to you by your health care provider. Make sure you discuss any questions you have with your health care provider.  Document Revised: 05/06/2021 Document Reviewed: 05/06/2021  Elsevier Patient Education © 2023 [employer]

## 2024-02-02 PROBLEM — M25.562 ARTHRALGIA OF LEFT KNEE: Status: ACTIVE | Noted: 2024-01-19

## 2024-02-02 PROBLEM — M17.12 PRIMARY OSTEOARTHRITIS OF LEFT KNEE: Status: ACTIVE | Noted: 2024-01-19

## 2024-02-23 ENCOUNTER — OFFICE VISIT (OUTPATIENT)
Dept: ORTHOPEDIC SURGERY | Facility: CLINIC | Age: 74
End: 2024-02-23
Payer: MEDICARE

## 2024-02-23 ENCOUNTER — PRE-ADMISSION TESTING (OUTPATIENT)
Dept: PREADMISSION TESTING | Facility: HOSPITAL | Age: 74
End: 2024-02-23
Payer: MEDICARE

## 2024-02-23 ENCOUNTER — HOSPITAL ENCOUNTER (OUTPATIENT)
Dept: GENERAL RADIOLOGY | Facility: HOSPITAL | Age: 74
Discharge: HOME | End: 2024-02-23
Payer: MEDICARE

## 2024-02-23 VITALS
WEIGHT: 141.4 LBS | BODY MASS INDEX: 23.56 KG/M2 | DIASTOLIC BLOOD PRESSURE: 64 MMHG | SYSTOLIC BLOOD PRESSURE: 116 MMHG | HEIGHT: 65 IN | TEMPERATURE: 97.5 F

## 2024-02-23 DIAGNOSIS — Z96.651 HISTORY OF TOTAL KNEE REPLACEMENT, RIGHT: ICD-10-CM

## 2024-02-23 DIAGNOSIS — M17.12 PRIMARY OSTEOARTHRITIS OF LEFT KNEE: ICD-10-CM

## 2024-02-23 DIAGNOSIS — M25.562 ARTHRALGIA OF LEFT KNEE: ICD-10-CM

## 2024-02-23 DIAGNOSIS — M21.162 ACQUIRED VARUS DEFORMITY OF KNEE, LEFT: ICD-10-CM

## 2024-02-23 DIAGNOSIS — M25.562 ARTHRALGIA OF LEFT KNEE: Primary | ICD-10-CM

## 2024-02-23 LAB
ABO GROUP BLD: NORMAL
ALBUMIN SERPL-MCNC: 4.6 G/DL (ref 3.5–5.2)
ALBUMIN/GLOB SERPL: 2 G/DL
ALP SERPL-CCNC: 68 U/L (ref 39–117)
ALT SERPL W P-5'-P-CCNC: 14 U/L (ref 1–33)
ANION GAP SERPL CALCULATED.3IONS-SCNC: 11.6 MMOL/L (ref 5–15)
APTT PPP: 22.3 SECONDS (ref 23.5–35.5)
AST SERPL-CCNC: 19 U/L (ref 1–32)
BACTERIA UR QL AUTO: ABNORMAL /HPF
BASOPHILS # BLD AUTO: 0.04 10*3/MM3 (ref 0–0.2)
BASOPHILS NFR BLD AUTO: 0.6 % (ref 0–1.5)
BILIRUB SERPL-MCNC: 0.3 MG/DL (ref 0–1.2)
BILIRUB UR QL STRIP: NEGATIVE
BUN SERPL-MCNC: 11 MG/DL (ref 8–23)
BUN/CREAT SERPL: 18.3 (ref 7–25)
CALCIUM SPEC-SCNC: 9.8 MG/DL (ref 8.6–10.5)
CHLORIDE SERPL-SCNC: 103 MMOL/L (ref 98–107)
CLARITY UR: CLEAR
CO2 SERPL-SCNC: 27.4 MMOL/L (ref 22–29)
COLOR UR: YELLOW
CREAT SERPL-MCNC: 0.6 MG/DL (ref 0.57–1)
DEPRECATED RDW RBC AUTO: 44.6 FL (ref 37–54)
EGFRCR SERPLBLD CKD-EPI 2021: 95.5 ML/MIN/1.73
EOSINOPHIL # BLD AUTO: 0.14 10*3/MM3 (ref 0–0.4)
EOSINOPHIL NFR BLD AUTO: 2.1 % (ref 0.3–6.2)
ERYTHROCYTE [DISTWIDTH] IN BLOOD BY AUTOMATED COUNT: 13.4 % (ref 12.3–15.4)
GLOBULIN UR ELPH-MCNC: 2.3 GM/DL
GLUCOSE SERPL-MCNC: 97 MG/DL (ref 65–99)
GLUCOSE UR STRIP-MCNC: NEGATIVE MG/DL
HBA1C MFR BLD: 5.8 % (ref 4.8–5.6)
HCT VFR BLD AUTO: 42.9 % (ref 34–46.6)
HGB BLD-MCNC: 13.9 G/DL (ref 12–15.9)
HGB UR QL STRIP.AUTO: NEGATIVE
HYALINE CASTS UR QL AUTO: ABNORMAL /LPF
IMM GRANULOCYTES # BLD AUTO: 0.01 10*3/MM3 (ref 0–0.05)
IMM GRANULOCYTES NFR BLD AUTO: 0.1 % (ref 0–0.5)
INR PPP: 0.97 (ref 0.9–1.1)
KETONES UR QL STRIP: NEGATIVE
LEUKOCYTE ESTERASE UR QL STRIP.AUTO: ABNORMAL
LYMPHOCYTES # BLD AUTO: 2.31 10*3/MM3 (ref 0.7–3.1)
LYMPHOCYTES NFR BLD AUTO: 34.5 % (ref 19.6–45.3)
MCH RBC QN AUTO: 29.3 PG (ref 26.6–33)
MCHC RBC AUTO-ENTMCNC: 32.4 G/DL (ref 31.5–35.7)
MCV RBC AUTO: 90.3 FL (ref 79–97)
MONOCYTES # BLD AUTO: 0.58 10*3/MM3 (ref 0.1–0.9)
MONOCYTES NFR BLD AUTO: 8.7 % (ref 5–12)
NEUTROPHILS NFR BLD AUTO: 3.61 10*3/MM3 (ref 1.7–7)
NEUTROPHILS NFR BLD AUTO: 54 % (ref 42.7–76)
NITRITE UR QL STRIP: NEGATIVE
NRBC BLD AUTO-RTO: 0 /100 WBC (ref 0–0.2)
PH UR STRIP.AUTO: 7 [PH] (ref 5–8)
PLATELET # BLD AUTO: 331 10*3/MM3 (ref 140–450)
PMV BLD AUTO: 8.8 FL (ref 6–12)
POTASSIUM SERPL-SCNC: 4.2 MMOL/L (ref 3.5–5.2)
PROT SERPL-MCNC: 6.9 G/DL (ref 6–8.5)
PROT UR QL STRIP: NEGATIVE
PROTHROMBIN TIME: 13.4 SECONDS (ref 12.3–15.1)
RBC # BLD AUTO: 4.75 10*6/MM3 (ref 3.77–5.28)
RBC # UR STRIP: ABNORMAL /HPF
REF LAB TEST METHOD: ABNORMAL
RH BLD: POSITIVE
SODIUM SERPL-SCNC: 142 MMOL/L (ref 136–145)
SP GR UR STRIP: 1.01 (ref 1–1.03)
SQUAMOUS #/AREA URNS HPF: ABNORMAL /HPF
UROBILINOGEN UR QL STRIP: ABNORMAL
WBC # UR STRIP: ABNORMAL /HPF
WBC NRBC COR # BLD AUTO: 6.69 10*3/MM3 (ref 3.4–10.8)

## 2024-02-23 PROCEDURE — 86900 BLOOD TYPING SEROLOGIC ABO: CPT

## 2024-02-23 PROCEDURE — 86901 BLOOD TYPING SEROLOGIC RH(D): CPT

## 2024-02-23 PROCEDURE — 85730 THROMBOPLASTIN TIME PARTIAL: CPT

## 2024-02-23 PROCEDURE — 36415 COLL VENOUS BLD VENIPUNCTURE: CPT

## 2024-02-23 PROCEDURE — 93010 ELECTROCARDIOGRAM REPORT: CPT | Performed by: INTERNAL MEDICINE

## 2024-02-23 PROCEDURE — 83036 HEMOGLOBIN GLYCOSYLATED A1C: CPT

## 2024-02-23 PROCEDURE — 87086 URINE CULTURE/COLONY COUNT: CPT

## 2024-02-23 PROCEDURE — 71046 X-RAY EXAM CHEST 2 VIEWS: CPT

## 2024-02-23 PROCEDURE — 87081 CULTURE SCREEN ONLY: CPT

## 2024-02-23 PROCEDURE — 93005 ELECTROCARDIOGRAM TRACING: CPT

## 2024-02-23 PROCEDURE — 85610 PROTHROMBIN TIME: CPT

## 2024-02-23 PROCEDURE — 81001 URINALYSIS AUTO W/SCOPE: CPT

## 2024-02-23 PROCEDURE — 85025 COMPLETE CBC W/AUTO DIFF WBC: CPT

## 2024-02-23 PROCEDURE — 87147 CULTURE TYPE IMMUNOLOGIC: CPT

## 2024-02-23 PROCEDURE — 80053 COMPREHEN METABOLIC PANEL: CPT

## 2024-02-23 NOTE — PROGRESS NOTES
Sahara Hardwick is a 73 y.o. right hand dominant female referred by [unfilled]  is here today for a pre-operative history and physical discussion of treatment plans, surgery, and rehabilitation.  Pre-op Exam of the Left Knee (Left total knee arthroplasty scheduled 3/5/24)       CHIEF COMPLAINT:    Pre-operative evaluation with history and physical exam    History of Present Illness      Patient presents today for pre-operative exam prior to planned elective left total knee replacement   She reports chronic left knee pain, stiffness, occasional swelling and activity limitations persistent since her recent last office visit.  She has had treatment including arthritis exercises, medication and injection therapy.  Her most recent left knee injection on 9/26/23 provided only short term relief.  Patient has been ambulating with a left knee antalgic and varus deformity limp.  She is retired, remains active, and doing routine home exercises.  Patient wants to proceed with elective left total knee replacement to treat left knee advanced osteoarthritis pain, stiffness and limitations.  She continues with continued pain relief and a good functional recovery after right total knee replacement in 2017.     PAST MEDICAL HISTORY:    Past Medical History:   Diagnosis Date    Adenomatous colon polyp 01/03/2024    Allergic 1995    seasonal    Arthritis     Celiac disease     Colon polyp     Diverticulitis of colon 10 yrs ago    Diverticulosis 2014    Elevated cholesterol     GERD (gastroesophageal reflux disease)     Hard of hearing     Hypertension     Kidney stone 1980    Osteopenia 2018    osteopenia    SCC (squamous cell carcinoma), leg, right     excision- derm 2021    Sciatica 04/2019    Wears glasses        Past Surgical History:   Procedure Laterality Date    APPENDECTOMY  1992    CHOLECYSTECTOMY  1992    COLONOSCOPY  2009    COLONOSCOPY N/A 01/03/2024    Procedure: COLONOSCOPY with biopsy and polypectomy;  Surgeon:  Nakia Smallwood MD;  Location: [facility];  Service: Gastroenterology;  Laterality: N/A;    COLONOSCOPY W/ POLYPECTOMY       dr micheal corbett    ENDOSCOPY N/A 2024    Procedure: ESOPHAGOGASTRODUODENOSCOPY with biopsy;  Surgeon: Nakia Smallwood MD;  Location: [facility];  Service: Gastroenterology;  Laterality: N/A;    HAND SURGERY      right middle finger arthritis nodule removed.    MA ARTHRP KNE CONDYLE&PLATU MEDIAL&LAT COMPARTMENTS Right 2017    Procedure: Elective right total knee replacement (BIOMET);  Surgeon: John Dewitt MD;  Location: [facility] OR;  Service: Orthopedics    TOTAL ABDOMINAL HYSTERECTOMY WITH SALPINGO OOPHORECTOMY         Family History   Problem Relation Age of Onset    Osteoarthritis Mother     Heart block Mother     Cancer Mother         breast/     Arthritis Mother     Thyroid disease Mother     Colon cancer Father         Diagnosed in his 60's    Heart block Father     Diabetes Father              Hyperlipidemia Father     Cancer Father         colon/     Heart disease Father     Coronary artery disease Other        Social History     Socioeconomic History    Marital status:    Tobacco Use    Smoking status: Never    Smokeless tobacco: Never   Vaping Use    Vaping Use: Never used   Substance and Sexual Activity    Alcohol use: Never    Drug use: Never    Sexual activity: Defer          Current Outpatient Medications:     acetaminophen (TYLENOL) 650 MG 8 hr tablet, Take 1 tablet by mouth Every 8 (Eight) Hours As Needed for Mild Pain., Disp: , Rfl:     Cholecalciferol (Vitamin D3) 50 MCG (2000 UT) tablet, Take 1 tablet by mouth Daily., Disp: , Rfl:     Cimetidine (TAGAMET PO), Take  by mouth Daily As Needed., Disp: , Rfl:     lisinopril-hydrochlorothiazide (PRINZIDE,ZESTORETIC) 20-12.5 MG per tablet, Take 0.5 tablets by mouth Daily. (Patient taking differently: Take 0.5 tablets by mouth Every Afternoon.),  "Disp: 45 tablet, Rfl: 2    Multiple Vitamins-Minerals (MULTIVITAMIN ADULT PO), Take 1 tablet by mouth Daily., Disp: , Rfl:     rosuvastatin (Crestor) 10 MG tablet, Take 1 tablet by mouth Every Night., Disp: 90 tablet, Rfl: 2    tacrolimus (PROGRAF) 1 MG capsule, Apply 1 capsule to the mouth or throat Take As Directed. Empty contents of 1 capsule into 1 L distilled water; Once dissolved, swish and spit up to 4 times a day., Disp: , Rfl:     Allergies   Allergen Reactions    Gluten Meal GI Intolerance    Wheat Diarrhea    Latex Rash     Tape and adhesive bandages cause rash     Milk-Related Compounds GI Intolerance       Review of Systems   Constitutional:  Negative for fever.   HENT:  Negative for voice change.    Eyes:  Negative for redness.   Respiratory:  Negative for shortness of breath.    Cardiovascular:  Negative for chest pain.   Gastrointestinal:  Negative for abdominal distention.   Genitourinary:  Negative for dysuria.   Musculoskeletal:  Positive for arthralgias, gait problem and joint swelling.   Skin:  Negative for color change and rash.   Neurological:  Negative for speech difficulty and weakness.   Psychiatric/Behavioral:  Negative for confusion.      I have reviewed the medical and surgical history, family history, social history, medications, and/or allergies, and the review of systems of this report.    PHYSICAL EXAMINATION:       /64 (BP Location: Left arm, Patient Position: Sitting, Cuff Size: Adult)   Temp 97.5 °F (36.4 °C)   Ht 165.1 cm (65\")   Wt 64.1 kg (141 lb 6.4 oz)   BMI 23.53 kg/m²     GENERAL [x] Well developed  []Ill appearing [x] No acute distress    HEENT [x]No acute changes [x] Normocephalic, atraumatic    NECK [x]Supple  [] No midline tenderness    LUNGS [x]Clear bilaterally [x]No wheezes []Rhonchi [] Rales    HEART [x] Regular rate  [x] Regular rhythm [] Irregular    ABDOMEN [x] Soft [x] Not tender [x] Not distended [x] Normal sounds    VAS/EXT [x] Normal Pulses []Edema " []Cyanosis             SKIN [x] Warm [x]Dry []Pink []Ecchymosis []Cool    NEURO [x] Sensation Intact [x] Motor Intact [x] Pulse intact    MUSCULOSKELETAL [x]  Left knee AROM 5 - 100, mild swelling, varus deformity and collateral ligament imbalance.        Physical Exam  Ortho Exam   Neurologic Exam      DVT Screening: No Yes   Smoker [x] []   Personal/Family History of DVT or PE [x] []   Sedentary Lifestyle [x] []   BMI >30 [x] []      Tranexamic acid TXA criteria for usage during arthroplasty surgery.    Previous or Active DVT/PE: NO  Cardiac Stent within 1 year: NO  Embolic/Ischemic stroke within 1 year: NO  GFR less than 30ml/min (advanced kidney disease): NO  NOTE:  TXA  tranexemic acid summary: THIS PATIENT IS A CANDIDATE FOR IV TXA DURING SURGERY.     Independent Review of Radiographic Studies:    No new imaging done today.    Laboratory and Other Studies:  Pre-op labs and studies.     Medical Decision Making:    Limited progress with persistent and worsening symptoms.   Continue care plan with work-up and treatment as noted.  Elective surgical treatment of chronic condition.  Medications as prescribed and only as tolerated.  Physical and occupational therapy planned.  Decision for surgery and patient counseling as noted in report.  Activity restrictions as appropriate.        Diagnoses and all orders for this visit:    1. Arthralgia of left knee (Primary)    2. Primary osteoarthritis of left knee    3. Acquired varus deformity of knee, left    4. History of total knee replacement, right       Assessment/Plan:    *SPECIAL INSTRUCTIONS:      Multi-modal analgesia.    Multi-modal DVT prophylaxis.   Tranexamic acid IV protocol (see screening parameters this report).    Rehabilitation PT/OT protocol with same day walker ambulation with therapist.  Post-discharge rehabilitation PT/OT planned.    Risks, benefits, and alternative treatments discussed with the patient: [x] Yes [] No    Risk benefits and merits of the  proposed surgery were discussed and the patient's questions were answered risks discussed including and not limited to:  Anesthesia reactions  Blood loss and possible transfusion  Infection  Deep venous thrombosis and pulmonary embolus  Nerve, vascular or tendon injury  Fracture  Deformity  Stiffness  Weakness  Prosthesis and implant issues such as loosening, material wear or dislocation  Skin necrosis  Revision surgery or further treatment  Recurrence of problem and condition     Informed consent: [] Signed  [x] To be obtained at hospital  [] Both    Recommendations/Plan:  Discussion of orthopaedic goals and activities and patient expressed appreciation.  Risk, benefits, and merits of treatment alternatives reviewed with the patient and questions answered  Regular exercise as tolerated  Guided on proper techniques for mobility and conditioning exercises  The nature of the proposed surgery reviewed with patient including risks, benefits, rehabilitation, recovery time, and outcome expectations  Ice, heat, and/or modalities as beneficial  Physical therapy program planned  Take prescribed medications as instructed only as tolerated  After care and dental prophylaxis for joint replacement prosthesis    Exercise, medications, injections, other patient advice, and return appointment as noted.  Brace: No brace was given at today's visit.  Referral: No referrals made at today's visit.  Test/Studies: Pre-op labs and tests.  Surgery: Surgery proposed at this visit as noted.  Work/Activity Status: Usual activities, routine exercise as tolerated, light physical work as tolerated, no strenuous activity.    PLANNED SURGICAL PROCEDURE:  Elective left total knee replacement.    Return in about 24 days (around 3/18/2024) for Post-op, recheck.  Patient is encouraged and agreeable to call or return sooner for any issues or concerns.

## 2024-02-23 NOTE — DISCHARGE INSTRUCTIONS
"No pushing, pulling, tugging,  heavy lifting, or strenuous activity.  No major decision making, driving, or drinking alcoholic beverages for 24 hours. ( due to the medications you have  received)  Always use good hand hygiene/washing techniques.  NO driving while taking pain medications.    * if you have an incision:  Check your incision area every day for signs of infection.   Check for:  * more redness, swelling, or pain  *more fluid or blood  *warmth  *pus or bad smellRest today  No pushing,pulling,tugging,heavy lifting, or strenuous activity   No major decision making,driving,or drinking alcoholic beverages for 24 hours due to the sedation you received  Always use good hand hygiene/washing technique  No driving on pain medication.To assist you in voiding:  Drink plenty of fluids  Listen to running water while attempting to void.    If you are unable to urinate and you have an uncomfortable urge to void or it has been   6 hours since you were discharged, return to the Emergency RoomARROW PAIN PUMP  PATIENT INSTRUCTIONS    You have had regional anesthesia with a catheter as part of your anesthetic care.    Because of this your extremity may be numb and very weak.  You must protect   your extremity.  Wear the sling if your surgeon has given you one.  Take care to   avoid hot, very cold or sharp items.  As the block wears off, you may have a tingling   or a \"pins and needles\" sensation in your arm and hand.  This is normal.    The Arrow pain pump is infusing medicine all the time which will help control your   pain as the block wears off.  Your extremity may not be as numb after the first 12 to   18 hours.    Do not tug on or kink the catheter.  Keep the insertion site into the skin and any   connections clean.    CALL ANESTHESIA  IMMEDIATELY FOR:     -A metallic taste in your mouth       -Ringing in the ears        -Persistent tremors or shakes or a seizure      -Redness, pain or swelling at the catheter insertion " site    -A cold, dusky or dark extremity   -Severe pain and you can't move your fingers or toes    The Arrow pain ball is initially set at _____  mL/hour. The black arrow at the top of the   dial points to the rate the medication is being delivered. Do not set the pump in between   the numbers as it will not work correctly. The white clamp must be open at all times.    If you are having pain, increase the pain ball rate  to 14 ml/hour for ONE hour.   Then return to your original rate, or if pain is not adequately relieved you may increase it by 2mL/hour.  If your extremity is too numb, you may turn down the pain ball 2 mL/hour every 2 hours.    Do not titrate down too fast; you may then experience pain.    You may also take the prescribed pain medication from your doctor.     The pain ball and catheter may stay in up to 4 days.    Leaking at the catheter site may occur.  The pain ball is still working if it's controlling your pain.    Reinforce the dressing with additional tegaderm.    When the pain ball is empty it will be flat.  Remove the catheter by pulling off the dressing slowly   and pull the catheter out of the skin.  Confirm that the tip of the catheter is intact once removed.   If the catheter is stuck but not hurting, reposition your extremity and keep pulling slowly until   removed.  If the catheter is hurting and won't pull out,     CALL THE NUMBER BELOW:          PRIMARY CONTACT: Anesthesia Service   (Mon-Fri 7:00 AM-3:00 PM): [phone]    After 3:00 PM: [phone] (Tell the hospital  you have a pain pump and need  to speak with anesthesia)    DO NOT CUT THE CATHETER FOR ANY REASON:    After removal you may throw the pump and catheter in the regular trash.    Frequently Asked questions about Pain Blocks    1. What are the advantages of having a nerve block?    A nerve block decreases the pain after surgery and lessens the need for narcotics. Narcotics cause nausea, vomiting, sleepiness,  constipation and delayed mobility.  2. Will the procedure hurt?   You will be given sedation for the procedure. We need you to be alert enough to follow instructions during the block.  3. Am I required to have a nerve block?    No, this is a service that we offer to improve comfort and reduce pain medication requirement following surgery. You can refuse to have a nerve block.      Single Injection    1. Is it normal for my extremity to be numb after 16 hours?  Yes.  Weakness and numbness can last 24 hours or more. If you are concerned call Anesthesia.     2. After shoulder surgery my eyelid on the same side as my surgery Is dropping. Is this normal?   The medication injected may contact nerves that affect your eyelid and cause drooping. This will wear off as the pain block wears. If you are concerned call Anesthesia.    3. After shoulder surgery it feels like it is hard to take a deep breath. Is this normal?   Yes.  This will go away as the medication for the block wears off. If you are extremely short of breath call 911.      Continuous Infusion with Arrow Pain Pump Autofuser :    1. Who do I call if I  have a question or concern about the Arrow autoinfuser?  Call the AutoFuser disposable pain pump help line at [phone]      2. Can I get the clear dressing wet when Itake a shower?  No. This dressing must remain dry or It will loosen and the catheter may come out.    3. Does the catheter need to be removed immediately after the pain ball is empty?   No. The empty pain pump can remain in place until it is convenient to be removed. However, It is important that the catheter does get removed as soon as possible after completion.    4. If I decide I don't like the catheter after it Is placed, do I have to wait for the pain ball to go flat before Ican remove it?   No the catheter can be removed at anytime . Once it is removed it cannot be replaced.    5. If I accidentally pull the catheter out, will I be causing  any problems?   No. Be aware that the pain block will wear off in 2-4 hours so you must begin taking pain medication as prescribed.     6. How do I know if the medication Is infusing?   You pain ball will begin to shrink as the medication goes in. Then after 24 hours you will notice that the pain ball begins to get smaller.    7. What do I do If I notice clear or pink colored drainage collecting under the dressing?    Drainage around the catheter site is normal and can be clear, pink, and sometimes red. You can reinforce the dressing with anything that will absorb drainage.    8. When I remove the catheter should I expect some bleeding?   Yes. It is not uncommon for small amount of bleeding to occur after the catheter is removed. Apply direct pressure for 5 minutes and cover with a Band-Aid.    9. Will I need to take the pain medication ordered by my surgeon?   If you are going home on the day of surgery get your prescription filled. The pain ball will help decrease the need for pain medications but sometimes it is  necessary to take prescribed pain medication. If you are staying in the hospital overnight, you must communicate with your nurse about your pain level.          I have received a copy these instructions.     __________________________________________________________       Patient Signature    *Please ensure that patient receives a copy and a signed copy is placed in chart*

## 2024-02-23 NOTE — H&P (VIEW-ONLY)
Sahara Hardwick is a 73 y.o. right hand dominant female referred by [unfilled]  is here today for a pre-operative history and physical discussion of treatment plans, surgery, and rehabilitation.  Pre-op Exam of the Left Knee (Left total knee arthroplasty scheduled 3/5/24)       CHIEF COMPLAINT:    Pre-operative evaluation with history and physical exam    History of Present Illness      Patient presents today for pre-operative exam prior to planned elective left total knee replacement   She reports chronic left knee pain, stiffness, occasional swelling and activity limitations persistent since her recent last office visit.  She has had treatment including arthritis exercises, medication and injection therapy.  Her most recent left knee injection on 9/26/23 provided only short term relief.  Patient has been ambulating with a left knee antalgic and varus deformity limp.  She is retired, remains active, and doing routine home exercises.  Patient wants to proceed with elective left total knee replacement to treat left knee advanced osteoarthritis pain, stiffness and limitations.  She continues with continued pain relief and a good functional recovery after right total knee replacement in 2017.     PAST MEDICAL HISTORY:    Past Medical History:   Diagnosis Date    Adenomatous colon polyp 01/03/2024    Allergic 1995    seasonal    Arthritis     Celiac disease     Colon polyp     Diverticulitis of colon 10 yrs ago    Diverticulosis 2014    Elevated cholesterol     GERD (gastroesophageal reflux disease)     Hard of hearing     Hypertension     Kidney stone 1980    Osteopenia 2018    osteopenia    SCC (squamous cell carcinoma), leg, right     excision- derm 2021    Sciatica 04/2019    Wears glasses        Past Surgical History:   Procedure Laterality Date    APPENDECTOMY  1992    CHOLECYSTECTOMY  1992    COLONOSCOPY  2009    COLONOSCOPY N/A 01/03/2024    Procedure: COLONOSCOPY with biopsy and polypectomy;  Surgeon:  Nakia Smallwood MD;  Location: [facility];  Service: Gastroenterology;  Laterality: N/A;    COLONOSCOPY W/ POLYPECTOMY       dr micheal corbett    ENDOSCOPY N/A 2024    Procedure: ESOPHAGOGASTRODUODENOSCOPY with biopsy;  Surgeon: Nakia Smallwood MD;  Location: [facility];  Service: Gastroenterology;  Laterality: N/A;    HAND SURGERY      right middle finger arthritis nodule removed.    [place] ARTHRP KNE CONDYLE&PLATU MEDIAL&LAT COMPARTMENTS Right 2017    Procedure: Elective right total knee replacement (BIOMET);  Surgeon: John Dewitt MD;  Location: [facility] OR;  Service: Orthopedics    TOTAL ABDOMINAL HYSTERECTOMY WITH SALPINGO OOPHORECTOMY         Family History   Problem Relation Age of Onset    Osteoarthritis Mother     Heart block Mother     Cancer Mother         breast/     Arthritis Mother     Thyroid disease Mother     Colon cancer Father         Diagnosed in his 60's    Heart block Father     Diabetes Father              Hyperlipidemia Father     Cancer Father         colon/     Heart disease Father     Coronary artery disease Other        Social History     Socioeconomic History    Marital status:    Tobacco Use    Smoking status: Never    Smokeless tobacco: Never   Vaping Use    Vaping Use: Never used   Substance and Sexual Activity    Alcohol use: Never    Drug use: Never    Sexual activity: Defer          Current Outpatient Medications:     acetaminophen (TYLENOL) 650 MG 8 hr tablet, Take 1 tablet by mouth Every 8 (Eight) Hours As Needed for Mild Pain., Disp: , Rfl:     Cholecalciferol (Vitamin D3) 50 MCG (2000 UT) tablet, Take 1 tablet by mouth Daily., Disp: , Rfl:     Cimetidine (TAGAMET PO), Take  by mouth Daily As Needed., Disp: , Rfl:     lisinopril-hydrochlorothiazide (PRINZIDE,ZESTORETIC) 20-12.5 MG per tablet, Take 0.5 tablets by mouth Daily. (Patient taking differently: Take 0.5 tablets by mouth Every Afternoon.),  "Disp: 45 tablet, Rfl: 2    Multiple Vitamins-Minerals (MULTIVITAMIN ADULT PO), Take 1 tablet by mouth Daily., Disp: , Rfl:     rosuvastatin (Crestor) 10 MG tablet, Take 1 tablet by mouth Every Night., Disp: 90 tablet, Rfl: 2    tacrolimus (PROGRAF) 1 MG capsule, Apply 1 capsule to the mouth or throat Take As Directed. Empty contents of 1 capsule into 1 L distilled water; Once dissolved, swish and spit up to 4 times a day., Disp: , Rfl:     Allergies   Allergen Reactions    Gluten Meal GI Intolerance    Wheat Diarrhea    Latex Rash     Tape and adhesive bandages cause rash     Milk-Related Compounds GI Intolerance       Review of Systems   Constitutional:  Negative for fever.   HENT:  Negative for voice change.    Eyes:  Negative for redness.   Respiratory:  Negative for shortness of breath.    Cardiovascular:  Negative for chest pain.   Gastrointestinal:  Negative for abdominal distention.   Genitourinary:  Negative for dysuria.   Musculoskeletal:  Positive for arthralgias, gait problem and joint swelling.   Skin:  Negative for color change and rash.   Neurological:  Negative for speech difficulty and weakness.   Psychiatric/Behavioral:  Negative for confusion.      I have reviewed the medical and surgical history, family history, social history, medications, and/or allergies, and the review of systems of this report.    PHYSICAL EXAMINATION:       /64 (BP Location: Left arm, Patient Position: Sitting, Cuff Size: Adult)   Temp 97.5 °F (36.4 °C)   Ht 165.1 cm (65\")   Wt 64.1 kg (141 lb 6.4 oz)   BMI 23.53 kg/m²     GENERAL [x] Well developed  []Ill appearing [x] No acute distress    HEENT [x]No acute changes [x] Normocephalic, atraumatic    NECK [x]Supple  [] No midline tenderness    LUNGS [x]Clear bilaterally [x]No wheezes []Rhonchi [] Rales    HEART [x] Regular rate  [x] Regular rhythm [] Irregular    ABDOMEN [x] Soft [x] Not tender [x] Not distended [x] Normal sounds    VAS/EXT [x] Normal Pulses []Edema " []Cyanosis             SKIN [x] Warm [x]Dry []Pink []Ecchymosis []Cool    NEURO [x] Sensation Intact [x] Motor Intact [x] Pulse intact    MUSCULOSKELETAL [x]  Left knee AROM 5 - 100, mild swelling, varus deformity and collateral ligament imbalance.        Physical Exam  Ortho Exam   Neurologic Exam      DVT Screening: No Yes   Smoker [x] []   Personal/Family History of DVT or PE [x] []   Sedentary Lifestyle [x] []   BMI >30 [x] []      Tranexamic acid TXA criteria for usage during arthroplasty surgery.    Previous or Active DVT/PE: NO  Cardiac Stent within 1 year: NO  Embolic/Ischemic stroke within 1 year: NO  GFR less than 30ml/min (advanced kidney disease): NO  NOTE:  TXA  tranexemic acid summary: THIS PATIENT IS A CANDIDATE FOR IV TXA DURING SURGERY.     Independent Review of Radiographic Studies:    No new imaging done today.    Laboratory and Other Studies:  Pre-op labs and studies.     Medical Decision Making:    Limited progress with persistent and worsening symptoms.   Continue care plan with work-up and treatment as noted.  Elective surgical treatment of chronic condition.  Medications as prescribed and only as tolerated.  Physical and occupational therapy planned.  Decision for surgery and patient counseling as noted in report.  Activity restrictions as appropriate.        Diagnoses and all orders for this visit:    1. Arthralgia of left knee (Primary)    2. Primary osteoarthritis of left knee    3. Acquired varus deformity of knee, left    4. History of total knee replacement, right       Assessment/Plan:    *SPECIAL INSTRUCTIONS:      Multi-modal analgesia.    Multi-modal DVT prophylaxis.   Tranexamic acid IV protocol (see screening parameters this report).    Rehabilitation PT/OT protocol with same day walker ambulation with therapist.  Post-discharge rehabilitation PT/OT planned.    Risks, benefits, and alternative treatments discussed with the patient: [x] Yes [] No    Risk benefits and merits of the  proposed surgery were discussed and the patient's questions were answered risks discussed including and not limited to:  Anesthesia reactions  Blood loss and possible transfusion  Infection  Deep venous thrombosis and pulmonary embolus  Nerve, vascular or tendon injury  Fracture  Deformity  Stiffness  Weakness  Prosthesis and implant issues such as loosening, material wear or dislocation  Skin necrosis  Revision surgery or further treatment  Recurrence of problem and condition     Informed consent: [] Signed  [x] To be obtained at hospital  [] Both    Recommendations/Plan:  Discussion of orthopaedic goals and activities and patient expressed appreciation.  Risk, benefits, and merits of treatment alternatives reviewed with the patient and questions answered  Regular exercise as tolerated  Guided on proper techniques for mobility and conditioning exercises  The nature of the proposed surgery reviewed with patient including risks, benefits, rehabilitation, recovery time, and outcome expectations  Ice, heat, and/or modalities as beneficial  Physical therapy program planned  Take prescribed medications as instructed only as tolerated  After care and dental prophylaxis for joint replacement prosthesis    Exercise, medications, injections, other patient advice, and return appointment as noted.  Brace: No brace was given at today's visit.  Referral: No referrals made at today's visit.  Test/Studies: Pre-op labs and tests.  Surgery: Surgery proposed at this visit as noted.  Work/Activity Status: Usual activities, routine exercise as tolerated, light physical work as tolerated, no strenuous activity.    PLANNED SURGICAL PROCEDURE:  Elective left total knee replacement.    Return in about 24 days (around 3/18/2024) for Post-op, recheck.  Patient is encouraged and agreeable to call or return sooner for any issues or concerns.

## 2024-02-23 NOTE — DISCHARGE INSTRUCTIONS
Pre-Admission testing appointment completed today for patient's upcoming procedure here at [facility].    PAT PASS reviewed with patient and they verbalize understanding of the following:     Do not eat or drink anything after midnight the night before procedure unless otherwise instructed by physician/surgeon's office, this includes no gum, candy, mints, tobacco products or e-cigarettes.  Do not shave the area to be operated on at least 48 hours prior to procedure.  Do not wear makeup, lotion, hair products, or nail polish.  Do not wear any jewelry and remove all piercings.  Do not wear any adhesive if you wear dentures.  Do not wear contacts; bring in glasses if needed.  Only take medications on the morning of procedure as instructed by PAT nurse per anesthesia guidelines or as instructed by physician's office.  If you are on any blood thinners reach out to the physician/surgeon's office for instructions on when/if they will need to be stopped prior to procedure.  Bring in picture ID and insurance card, advanced directive copies if applicable, CPAP/BIPAP/Inhalers if indicated morning of procedure, leave any other valuables at home.  Ensure you have arranged for someone to drive you home the day of your procedure and someone to care for you at home afterwards. It is recommended that you do not drive, drink alcohol, or make any major legal decisions for at least 24 hours after your procedure is complete.  Chlorhexadine wipes along with instruction/information sheet given to patient if indicated. Instructed patient to date, time, and initial the verification sheet once skin prep has been  completed, and to return to [facility] the day of the procedure.     Introduction to anesthesia video watched by patient during appointment.  Instructions and information given to patient about parking, hospital entrance, and registration location.

## 2024-02-24 LAB
BACTERIA SPEC AEROBE CULT: ABNORMAL
MRSA SPEC QL CULT: NORMAL

## 2024-02-26 LAB
QT INTERVAL: 408 MS
QTC INTERVAL: 420 MS

## 2024-03-05 ENCOUNTER — ANESTHESIA EVENT (OUTPATIENT)
Dept: PERIOP | Facility: HOSPITAL | Age: 74
End: 2024-03-05
Payer: MEDICARE

## 2024-03-05 ENCOUNTER — APPOINTMENT (OUTPATIENT)
Dept: GENERAL RADIOLOGY | Facility: HOSPITAL | Age: 74
End: 2024-03-05
Payer: MEDICARE

## 2024-03-05 ENCOUNTER — ANESTHESIA EVENT CONVERTED (OUTPATIENT)
Dept: ANESTHESIOLOGY | Facility: HOSPITAL | Age: 74
End: 2024-03-05
Payer: MEDICARE

## 2024-03-05 ENCOUNTER — ANESTHESIA (OUTPATIENT)
Dept: PERIOP | Facility: HOSPITAL | Age: 74
End: 2024-03-05
Payer: MEDICARE

## 2024-03-05 ENCOUNTER — HOSPITAL ENCOUNTER (OUTPATIENT)
Facility: HOSPITAL | Age: 74
Setting detail: HOSPITAL OUTPATIENT SURGERY
Discharge: HOME HEALTH | End: 2024-03-05
Attending: ORTHOPAEDIC SURGERY | Admitting: ORTHOPAEDIC SURGERY
Payer: MEDICARE

## 2024-03-05 VITALS
TEMPERATURE: 97.3 F | RESPIRATION RATE: 20 BRPM | OXYGEN SATURATION: 97 % | HEART RATE: 71 BPM | SYSTOLIC BLOOD PRESSURE: 109 MMHG | DIASTOLIC BLOOD PRESSURE: 70 MMHG

## 2024-03-05 DIAGNOSIS — M25.562 ARTHRALGIA OF LEFT KNEE: ICD-10-CM

## 2024-03-05 DIAGNOSIS — M17.12 PRIMARY OSTEOARTHRITIS OF LEFT KNEE: Primary | ICD-10-CM

## 2024-03-05 DIAGNOSIS — Z96.652 STATUS POST TOTAL KNEE REPLACEMENT USING CEMENT, LEFT: Primary | ICD-10-CM

## 2024-03-05 DIAGNOSIS — M17.12 PRIMARY OSTEOARTHRITIS OF LEFT KNEE: ICD-10-CM

## 2024-03-05 LAB
ABO GROUP BLD: NORMAL
BLD GP AB SCN SERPL QL: NEGATIVE
RH BLD: POSITIVE
T&S EXPIRATION DATE: NORMAL

## 2024-03-05 PROCEDURE — C1713 ANCHOR/SCREW BN/BN,TIS/BN: HCPCS | Performed by: ORTHOPAEDIC SURGERY

## 2024-03-05 PROCEDURE — 86900 BLOOD TYPING SEROLOGIC ABO: CPT | Performed by: ORTHOPAEDIC SURGERY

## 2024-03-05 PROCEDURE — C1776 JOINT DEVICE (IMPLANTABLE): HCPCS | Performed by: ORTHOPAEDIC SURGERY

## 2024-03-05 PROCEDURE — 25010000002 MIDAZOLAM PER 1MG: Performed by: NURSE ANESTHETIST, CERTIFIED REGISTERED

## 2024-03-05 PROCEDURE — 97161 PT EVAL LOW COMPLEX 20 MIN: CPT

## 2024-03-05 PROCEDURE — 25010000002 CEFAZOLIN SODIUM-DEXTROSE 2-3 GM-%(50ML) RECONSTITUTED SOLUTION: Performed by: ORTHOPAEDIC SURGERY

## 2024-03-05 PROCEDURE — 86901 BLOOD TYPING SEROLOGIC RH(D): CPT | Performed by: ORTHOPAEDIC SURGERY

## 2024-03-05 PROCEDURE — 25010000002 ROPIVACAINE PER 1 MG: Performed by: ORTHOPAEDIC SURGERY

## 2024-03-05 PROCEDURE — 25010000002 ROPIVACAINE PER 1 MG: Performed by: NURSE ANESTHETIST, CERTIFIED REGISTERED

## 2024-03-05 PROCEDURE — 25010000002 BUPIVACAINE PF 0.75 % SOLUTION: Performed by: NURSE ANESTHETIST, CERTIFIED REGISTERED

## 2024-03-05 PROCEDURE — 25010000002 PROPOFOL 10 MG/ML EMULSION: Performed by: NURSE ANESTHETIST, CERTIFIED REGISTERED

## 2024-03-05 PROCEDURE — 25010000002 KETOROLAC TROMETHAMINE PER 15 MG: Performed by: ORTHOPAEDIC SURGERY

## 2024-03-05 PROCEDURE — 25010000002 DEXAMETHASONE PER 1 MG: Performed by: NURSE ANESTHETIST, CERTIFIED REGISTERED

## 2024-03-05 PROCEDURE — 73560 X-RAY EXAM OF KNEE 1 OR 2: CPT

## 2024-03-05 PROCEDURE — 25810000003 SODIUM CHLORIDE 0.9 % SOLUTION: Performed by: NURSE ANESTHETIST, CERTIFIED REGISTERED

## 2024-03-05 PROCEDURE — 25010000002 CEFAZOLIN PER 500 MG: Performed by: ORTHOPAEDIC SURGERY

## 2024-03-05 PROCEDURE — 86850 RBC ANTIBODY SCREEN: CPT | Performed by: ORTHOPAEDIC SURGERY

## 2024-03-05 PROCEDURE — 25810000003 LACTATED RINGERS PER 1000 ML: Performed by: ORTHOPAEDIC SURGERY

## 2024-03-05 PROCEDURE — 25010000002 ONDANSETRON PER 1 MG: Performed by: NURSE ANESTHETIST, CERTIFIED REGISTERED

## 2024-03-05 PROCEDURE — 27447 TOTAL KNEE ARTHROPLASTY: CPT | Performed by: ORTHOPAEDIC SURGERY

## 2024-03-05 PROCEDURE — 25010000002 MORPHINE PER 10 MG: Performed by: ORTHOPAEDIC SURGERY

## 2024-03-05 DEVICE — CMT BONE SIMPLEX/P FULL DOSE 10/PK: Type: IMPLANTABLE DEVICE | Site: KNEE | Status: FUNCTIONAL

## 2024-03-05 DEVICE — PAT 3PEG THN 31X6.2  31MM: Type: IMPLANTABLE DEVICE | Site: KNEE | Status: FUNCTIONAL

## 2024-03-05 DEVICE — TRY TIB CRUC 71MM: Type: IMPLANTABLE DEVICE | Site: KNEE | Status: FUNCTIONAL

## 2024-03-05 DEVICE — BEAR TIB/KN VANGUARD AS 16X71MM NS: Type: IMPLANTABLE DEVICE | Site: KNEE | Status: FUNCTIONAL

## 2024-03-05 DEVICE — CAP TOTL KN CMT PRIMARY: Type: IMPLANTABLE DEVICE | Status: FUNCTIONAL

## 2024-03-05 DEVICE — COMP FEM/KN VANGUARD INTLK CR 60MM NS LT: Type: IMPLANTABLE DEVICE | Site: KNEE | Status: FUNCTIONAL

## 2024-03-05 RX ORDER — CEFAZOLIN SODIUM 2 G/50ML
2000 SOLUTION INTRAVENOUS ONCE
Status: COMPLETED | OUTPATIENT
Start: 2024-03-05 | End: 2024-03-05

## 2024-03-05 RX ORDER — BUPIVACAINE HYDROCHLORIDE 7.5 MG/ML
INJECTION, SOLUTION EPIDURAL; RETROBULBAR
Status: COMPLETED | OUTPATIENT
Start: 2024-03-05 | End: 2024-03-05

## 2024-03-05 RX ORDER — MIDAZOLAM HYDROCHLORIDE 2 MG/2ML
INJECTION, SOLUTION INTRAMUSCULAR; INTRAVENOUS AS NEEDED
Status: DISCONTINUED | OUTPATIENT
Start: 2024-03-05 | End: 2024-03-05 | Stop reason: SURG

## 2024-03-05 RX ORDER — ROPIVACAINE HYDROCHLORIDE 5 MG/ML
INJECTION, SOLUTION EPIDURAL; INFILTRATION; PERINEURAL
Status: DISCONTINUED | OUTPATIENT
Start: 2024-03-05 | End: 2024-03-05 | Stop reason: SURG

## 2024-03-05 RX ORDER — PROCHLORPERAZINE EDISYLATE 5 MG/ML
10 INJECTION INTRAMUSCULAR; INTRAVENOUS ONCE AS NEEDED
Status: DISCONTINUED | OUTPATIENT
Start: 2024-03-05 | End: 2024-03-05 | Stop reason: HOSPADM

## 2024-03-05 RX ORDER — TRANEXAMIC ACID 10 MG/ML
1000 INJECTION, SOLUTION INTRAVENOUS
Qty: 100 ML | Refills: 0 | Status: DISCONTINUED | OUTPATIENT
Start: 2024-03-05 | End: 2024-03-05 | Stop reason: HOSPADM

## 2024-03-05 RX ORDER — ONDANSETRON 2 MG/ML
4 INJECTION INTRAMUSCULAR; INTRAVENOUS ONCE AS NEEDED
Status: DISCONTINUED | OUTPATIENT
Start: 2024-03-05 | End: 2024-03-05 | Stop reason: HOSPADM

## 2024-03-05 RX ORDER — CEFAZOLIN SODIUM 2 G/50ML
2 SOLUTION INTRAVENOUS
Qty: 1 EACH | Refills: 0 | Status: COMPLETED | OUTPATIENT
Start: 2024-03-05 | End: 2024-03-05

## 2024-03-05 RX ORDER — FAMOTIDINE 10 MG/ML
20 INJECTION, SOLUTION INTRAVENOUS
Qty: 2 ML | Refills: 0 | Status: COMPLETED | OUTPATIENT
Start: 2024-03-05 | End: 2024-03-05

## 2024-03-05 RX ORDER — TRANEXAMIC ACID 10 MG/ML
1000 INJECTION, SOLUTION INTRAVENOUS
Qty: 100 ML | Refills: 0 | Status: COMPLETED | OUTPATIENT
Start: 2024-03-05 | End: 2024-03-05

## 2024-03-05 RX ORDER — DOCUSATE SODIUM 100 MG/1
100 CAPSULE, LIQUID FILLED ORAL 2 TIMES DAILY PRN
Qty: 20 CAPSULE | Refills: 0 | Status: SHIPPED | OUTPATIENT
Start: 2024-03-05

## 2024-03-05 RX ORDER — HYDROCODONE BITARTRATE AND ACETAMINOPHEN 7.5; 325 MG/1; MG/1
1 TABLET ORAL EVERY 6 HOURS PRN
Qty: 28 TABLET | Refills: 0 | Status: SHIPPED | OUTPATIENT
Start: 2024-03-05

## 2024-03-05 RX ORDER — HYDROCODONE BITARTRATE AND ACETAMINOPHEN 10; 325 MG/1; MG/1
1 TABLET ORAL ONCE AS NEEDED
Status: DISCONTINUED | OUTPATIENT
Start: 2024-03-05 | End: 2024-03-05 | Stop reason: HOSPADM

## 2024-03-05 RX ORDER — SODIUM CHLORIDE, SODIUM LACTATE, POTASSIUM CHLORIDE, CALCIUM CHLORIDE 600; 310; 30; 20 MG/100ML; MG/100ML; MG/100ML; MG/100ML
1000 INJECTION, SOLUTION INTRAVENOUS CONTINUOUS
Status: DISCONTINUED | OUTPATIENT
Start: 2024-03-05 | End: 2024-03-05 | Stop reason: HOSPADM

## 2024-03-05 RX ORDER — MEPERIDINE HYDROCHLORIDE 25 MG/ML
12.5 INJECTION INTRAMUSCULAR; INTRAVENOUS; SUBCUTANEOUS
Status: DISCONTINUED | OUTPATIENT
Start: 2024-03-05 | End: 2024-03-05 | Stop reason: HOSPADM

## 2024-03-05 RX ORDER — LORAZEPAM 2 MG/ML
1 INJECTION INTRAMUSCULAR
Status: DISCONTINUED | OUTPATIENT
Start: 2024-03-05 | End: 2024-03-05 | Stop reason: HOSPADM

## 2024-03-05 RX ORDER — PROPOFOL 10 MG/ML
VIAL (ML) INTRAVENOUS CONTINUOUS PRN
Status: DISCONTINUED | OUTPATIENT
Start: 2024-03-05 | End: 2024-03-05 | Stop reason: SURG

## 2024-03-05 RX ORDER — DEXAMETHASONE SODIUM PHOSPHATE 4 MG/ML
INJECTION, SOLUTION INTRA-ARTICULAR; INTRALESIONAL; INTRAMUSCULAR; INTRAVENOUS; SOFT TISSUE AS NEEDED
Status: DISCONTINUED | OUTPATIENT
Start: 2024-03-05 | End: 2024-03-05 | Stop reason: SURG

## 2024-03-05 RX ORDER — ONDANSETRON 2 MG/ML
INJECTION INTRAMUSCULAR; INTRAVENOUS AS NEEDED
Status: DISCONTINUED | OUTPATIENT
Start: 2024-03-05 | End: 2024-03-05 | Stop reason: SURG

## 2024-03-05 RX ORDER — ONDANSETRON 4 MG/1
4 TABLET, FILM COATED ORAL EVERY 12 HOURS PRN
Qty: 20 TABLET | Refills: 0 | Status: SHIPPED | OUTPATIENT
Start: 2024-03-05

## 2024-03-05 RX ORDER — SODIUM CHLORIDE 0.9 % (FLUSH) 0.9 %
10 SYRINGE (ML) INJECTION AS NEEDED
Status: DISCONTINUED | OUTPATIENT
Start: 2024-03-05 | End: 2024-03-05 | Stop reason: HOSPADM

## 2024-03-05 RX ADMIN — SODIUM CHLORIDE, POTASSIUM CHLORIDE, SODIUM LACTATE AND CALCIUM CHLORIDE 1000 ML: 600; 310; 30; 20 INJECTION, SOLUTION INTRAVENOUS at 09:56

## 2024-03-05 RX ADMIN — TRANEXAMIC ACID 1000 MG: 10 INJECTION, SOLUTION INTRAVENOUS at 12:53

## 2024-03-05 RX ADMIN — ROPIVACAINE HYDROCHLORIDE 20 ML: 5 INJECTION, SOLUTION EPIDURAL; INFILTRATION; PERINEURAL at 13:43

## 2024-03-05 RX ADMIN — DEXAMETHASONE SODIUM PHOSPHATE 4 MG: 4 INJECTION, SOLUTION INTRA-ARTICULAR; INTRALESIONAL; INTRAMUSCULAR; INTRAVENOUS; SOFT TISSUE at 10:58

## 2024-03-05 RX ADMIN — CEFAZOLIN SODIUM 2000 MG: 2 SOLUTION INTRAVENOUS at 14:37

## 2024-03-05 RX ADMIN — SODIUM CHLORIDE, POTASSIUM CHLORIDE, SODIUM LACTATE AND CALCIUM CHLORIDE: 600; 310; 30; 20 INJECTION, SOLUTION INTRAVENOUS at 12:34

## 2024-03-05 RX ADMIN — MIDAZOLAM HYDROCHLORIDE 2 MG: 1 INJECTION, SOLUTION INTRAMUSCULAR; INTRAVENOUS at 10:55

## 2024-03-05 RX ADMIN — TRANEXAMIC ACID 1000 MG: 10 INJECTION, SOLUTION INTRAVENOUS at 11:10

## 2024-03-05 RX ADMIN — BUPIVACAINE HYDROCHLORIDE 1.8 ML: 7.5 INJECTION, SOLUTION EPIDURAL; RETROBULBAR at 10:56

## 2024-03-05 RX ADMIN — ONDANSETRON 4 MG: 2 INJECTION INTRAMUSCULAR; INTRAVENOUS at 10:58

## 2024-03-05 RX ADMIN — CEFAZOLIN SODIUM 2 G: 2 SOLUTION INTRAVENOUS at 11:01

## 2024-03-05 RX ADMIN — FAMOTIDINE 20 MG: 10 INJECTION, SOLUTION INTRAVENOUS at 09:56

## 2024-03-05 RX ADMIN — PROPOFOL 50 MCG/KG/MIN: 10 INJECTION, EMULSION INTRAVENOUS at 10:55

## 2024-03-05 NOTE — ANESTHESIA PROCEDURE NOTES
Spinal Block    Pre-sedation assessment completed: 3/5/2024 10:56 AM    Patient reassessed immediately prior to procedure    Patient location during procedure: OR  Start Time: 3/5/2024 10:56 AM  Stop Time: 3/5/2024 10:58 AM  Indication:at surgeon's request and procedure for pain  Performed By  CRNA/CAA: Alan Garzon CRNA  Preanesthetic Checklist  Completed: patient identified, IV checked, site marked, risks and benefits discussed, surgical consent, monitors and equipment checked, pre-op evaluation and timeout performed  Spinal Block Prep:  Patient Position:sitting  Sterile Tech:cap, gloves, mask and sterile barriers  Prep:Chloraprep  Patient Monitoring:blood pressure monitoring, continuous pulse oximetry and EKG    Spinal Block Procedure  Approach:midline  Guidance:landmark technique and palpation technique  Location:L3-L4  Needle Type:Pencan  Needle Gauge:25 G  Placement of Spinal needle event:cerebrospinal fluid aspirated  Paresthesia: no  Fluid Appearance:clear  Medications: bupivacaine PF (MARCAINE) injection 0.75% - Epidural   1.8 mL - 3/5/2024 10:56:00 AM   Post Assessment  Patient Tolerance:patient tolerated the procedure well with no apparent complications  Complications no  Additional Notes  Risks of spinal anesthesia discussed , including but not limited to:  Headache, itching, nausea and vomiting, infection, failure of the block, decreased BP, permanent chronic back pain, nerve damage, paralysis, etc.    Skin localized with lidocaine skin wheal.    SPINAL INJECTED INTRATHECALLY WITH    1.8 ml 0.75% Bupivacaine with dextrose  0 ml 0.5% Bupivacaine PF  0.1 ml of Epinephrine 1:1000  0 mg Morphine PF

## 2024-03-05 NOTE — ANESTHESIA PROCEDURE NOTES
Peripheral Block      Patient reassessed immediately prior to procedure    Patient location during procedure: post-op  Start time: 3/5/2024 1:40 PM  Stop time: 3/5/2024 1:43 PM  Reason for block: at surgeon's request and post-op pain management  Performed by  CRNA/CAA: Michael Arriaza CRNA  Preanesthetic Checklist  Completed: patient identified, IV checked, site marked, risks and benefits discussed, surgical consent, monitors and equipment checked, pre-op evaluation and timeout performed  Prep:  Pt Position: supine  Sterile barriers:cap, gloves, mask and sterile barriers  Prep: ChloraPrep  Patient monitoring: blood pressure monitoring, continuous pulse oximetry and EKG  Procedure  Performed under: spinal  Guidance:ultrasound guided    ULTRASOUND INTERPRETATION.  Using ultrasound guidance a gauge needle was placed in close proximity to the femoral nerve, at which point, under ultrasound guidance anesthetic was injected in the area of the nerve and spread of the anesthesia was seen on ultrasound in close proximity thereto.  There were no abnormalities seen on ultrasound; a digital image was taken; and the patient tolerated the procedure with no complications. Images:still images obtained    Laterality:left  Block Type:adductor canal block  Injection Technique:catheter  Needle Type:echogenic  Needle Gauge:18 G  Resistance on Injection: none  Catheter Size:20 G  Cath Depth at skin: 10 cm    Medications Used: ropivacaine (NAROPIN) injection 0.5 % - Peripheral Nerve   20 mL - 3/5/2024 1:43:00 PM      Medications  Comment:Adjuncts per total volume of LA:    NONE    If required, intravenous sedation was given -- see meds on anesthesia record.    Post Assessment  Injection Assessment: negative aspiration for heme, no paresthesia on injection and incremental injection  Patient Tolerance:comfortable throughout block  Complications:no  Additional Notes  Procedure:          CATHETER ADDUCTOR CANAL BLOCK                                                              CATHETER at skin: 10                                Patient analgesia was achieved with SAB       The pt was placed in the Supine position.  The Insertion site was  prepped and Draped in sterile fashion.  A  I-Flow 18g echogenic needle was then  inserted approximately midline, mid-thigh and advanced In-plane with Ultrasound guidance.  Normal Saline PSF was utilized for hydrodissection of tissue.  The Vastus medialis and Sartorius muscle where visualized and the needle tip was placed in the adductor canal,  lateral to the femoral artery.  LA injection spread was visualized, injection was incremental 1-5 ml, injection pressure was normal or little; no intraneural injection; no vascular injection.  LA dose was injected thru the needle (see dose above).  I-flow 20g catheter was placed via the needle with ultrasound visualization and confirmation with NS fluid bolus or air injection. The needle was then removed and the skin was sealed with Skin Affiix at catheter insertion site.  Skin was prepped with benzoin or mastisol and the labeled curled catheter was secured with steristrips and a transparent dressing.

## 2024-03-05 NOTE — PLAN OF CARE
Goal Outcome Evaluation:  Plan of Care Reviewed With: (P) patient        Progress: (P) no change  Outcome Evaluation: (P) PT eval completed today. Patient oriented x4 with  at bedside denies any pain. Patient performed supine to sit with SBA, ambulated 50 ft with SBA with front wheeled walker. Patient able to DC home safely, and was educated on importance of mobility during healing process and walker placement during ambulation. Patient left with nsg to get ready for DC,  at bedside, call button within reach.      Anticipated Discharge Disposition (PT): (P) home, home with assist

## 2024-03-05 NOTE — DISCHARGE PLACEMENT REQUEST
"Home Health referral.  Surgery patient. Will dc home today.     Sahara Stockton (73 y.o. Female)       Date of Birth   1950    Social Security Number       Address   [address] [id number]    Home Phone   [phone]    MRN   [id number]       Adventist   Buddhist    Marital Status                               Admission Date   3/5/24    Admission Type   Elective    Admitting Provider   John Dewitt MD    Attending Provider   John Dewitt MD    Department, Room/Bed   [facility] OR, [facility] OR/MAIN OR       Discharge Date       Discharge Disposition   Home-Health Care [facility]    Discharge Destination                                 Attending Provider: John Dewitt MD    Allergies: Gluten Meal, Wheat, Latex, Milk-related Compounds    Isolation: None   Infection: None   Code Status: Prior    Ht: 165.1 cm (65\")   Wt: 64.1 kg (141 lb 6.4 oz)    Admission Cmt: None   Principal Problem: None                  Active Insurance as of 3/5/2024       Primary Coverage       Payor Plan Insurance Group Employer/Plan Group    MEDICARE MEDICARE A & B        Payor Plan Address Payor Plan Phone Number Payor Plan Fax Number Effective Dates    [address] [phone]  9/1/2016 - None Entered    [facility] [id number]         Subscriber Name Subscriber Birth Date Member ID       SAHARA STOCKTON 1950 [id number]               Secondary Coverage       Payor Plan Insurance Group Employer/Plan Group    AARP MC SUP AARP HEALTH CARE OPTIONS        Payor Plan Address Payor Plan Phone Number Payor Plan Fax Number Effective Dates    [facility] [phone]  1/1/2017 - None Entered    [address]       [facility] [id number]         Subscriber Name Subscriber Birth Date Member ID       SAHARA STOCKTON 1950 [id number]                   Ambulatory Referral to Home Health (Hospital) [[id number]] (Order [id number])  Order  Date: 3/5/2024 Department: [facility] " OR Ordering/Authorizing: John Dewitt MD     Order History  Outpatient  Date/Time Action Taken User Additional Information   03/05/24 1329 Sign John Dewitt MD      Order Details    Frequency Duration Priority Order Class   None None Routine Internal Referral     Start Date/Time    Start Date   03/05/24     Order Information    Order Date Service Start Date Start Time   03/05/24 Surgery 03/05/24      Reference Links    Associated Reports External References   View Encounter Current Health Referral Information     Order Questions    Question Answer   Face to Face Visit Date: 3/5/2024   Follow-up provider for Plan of Care? I will be treating the patient on an ongoing basis.  Please send me the Plan of Care for signature.   Follow-up provider: JOHN DEWITT   Reason/Clinical Findings post-op L TKR   Describe mobility limitations that make leaving home difficult: post-op L TKR   Nursing/Therapeutic Services Requested Physical Therapy    Occupational Therapy   PT orders: Total joint pathway    Therapeutic exercise    Gait Training    Home safety assessment    Strengthening   Weight Bearing Status As Tolerated   Occupational orders: Activities of daily living    Strengthening    Home safety assessment   Frequency: 1 Week 1            Source Order Set / Preference List    Order Set Preference List   [facility] POST-OP MINOR ORTHO SURGERY DISCHARGE [[id number]] JOHN DEWITT'S ([id number]) ORDERS PREFERENCE OP [[id number]]           Clinical Indications     ICD-10-CM ICD-9-CM   Status post total knee replacement using cement, left  - Primary    Z96.652 V43.65                             Reprint Order Requisition    Ambulatory Referral to Home Health ([facility]) (Order #[id number]) on 3/5/24         Encounter    View Encounter                Order Provider Info        Office phone Pager E-mail   Ordering User  John Dewitt MD  [phone] -- --   Authorizing Provider  John Dewitt MD  [phone] --  --   Attending Provider  John Dewitt MD  [phone] -- --     Tracking Reports    Cosign Tracking Order Transmittal Tracking     Authorized by:  John Dewitt MD  (NPI: [id number])                Lab Component SmartPhrase Guide    Ambulatory Referral to Home Health ([facility]) (Order #[id number]) on 3/5/24         Emergency Contacts        (Rel.) Home Phone Work Phone Mobile Phone    Heath Hardwick (Spouse) [phone] -- [phone]                 History & Physical        John Dewitt MD at 03/05/24 1053          H&P reviewed. The patient was examined and there are no changes to the H&P.    Vitals:    03/05/24 0918   BP: 129/70   Pulse: 74   Resp: 16   Temp: 97.1 °F (36.2 °C)   SpO2: 98%       John Dewitt MD  3/5/2024  10:53 EST     Electronically signed by John Dewitt MD at 03/05/24 1053   Source Note: H&P (View-Only)          Sahara Hardwick is a 73 y.o. right hand dominant female referred by [unfilled]  is here today for a pre-operative history and physical discussion of treatment plans, surgery, and rehabilitation.  Pre-op Exam of the Left Knee (Left total knee arthroplasty scheduled 3/5/24)       CHIEF COMPLAINT:    Pre-operative evaluation with history and physical exam    History of Present Illness      Patient presents today for pre-operative exam prior to planned elective left total knee replacement   She reports chronic left knee pain, stiffness, occasional swelling and activity limitations persistent since her recent last office visit.  She has had treatment including arthritis exercises, medication and injection therapy.  Her most recent left knee injection on 9/26/23 provided only short term relief.  Patient has been ambulating with a left knee antalgic and varus deformity limp.  She is retired, remains active, and doing routine home exercises.  Patient wants to proceed with elective left total knee replacement to treat left knee advanced  osteoarthritis pain, stiffness and limitations.  She continues with continued pain relief and a good functional recovery after right total knee replacement in 2017.     PAST MEDICAL HISTORY:    Past Medical History:   Diagnosis Date    Adenomatous colon polyp 2024    Allergic     seasonal    Arthritis     Celiac disease     Colon polyp     Diverticulitis of colon 10 yrs ago    Diverticulosis     Elevated cholesterol     GERD (gastroesophageal reflux disease)     Hard of hearing     Hypertension     Kidney stone     Osteopenia     osteopenia    SCC (squamous cell carcinoma), leg, right     excision- derm     Sciatica 2019    Wears glasses        Past Surgical History:   Procedure Laterality Date    APPENDECTOMY      CHOLECYSTECTOMY      COLONOSCOPY      COLONOSCOPY N/A 2024    Procedure: COLONOSCOPY with biopsy and polypectomy;  Surgeon: Nakia Smallwood MD;  Location: [facility] ENDOSCOPY;  Service: Gastroenterology;  Laterality: N/A;    COLONOSCOPY W/ POLYPECTOMY       dr micheal corbett    ENDOSCOPY N/A 2024    Procedure: ESOPHAGOGASTRODUODENOSCOPY with biopsy;  Surgeon: Nakia Smallwood MD;  Location: [facility] ENDOSCOPY;  Service: Gastroenterology;  Laterality: N/A;    HAND SURGERY      right middle finger arthritis nodule removed.    [place] ARTHRP KNE CONDYLE&PLATU MEDIAL&LAT COMPARTMENTS Right 2017    Procedure: Elective right total knee replacement (BIOMET);  Surgeon: John Dewitt MD;  Location: [facility] OR;  Service: Orthopedics    TOTAL ABDOMINAL HYSTERECTOMY WITH SALPINGO OOPHORECTOMY         Family History   Problem Relation Age of Onset    Osteoarthritis Mother     Heart block Mother     Cancer Mother         breast/     Arthritis Mother     Thyroid disease Mother     Colon cancer Father         Diagnosed in his 60's    Heart block Father     Diabetes Father              Hyperlipidemia Father     Cancer Father          colon/     Heart disease Father     Coronary artery disease Other        Social History     Socioeconomic History    Marital status:    Tobacco Use    Smoking status: Never    Smokeless tobacco: Never   Vaping Use    Vaping Use: Never used   Substance and Sexual Activity    Alcohol use: Never    Drug use: Never    Sexual activity: Defer          Current Outpatient Medications:     acetaminophen (TYLENOL) 650 MG 8 hr tablet, Take 1 tablet by mouth Every 8 (Eight) Hours As Needed for Mild Pain., Disp: , Rfl:     Cholecalciferol (Vitamin D3) 50 MCG (2000 UT) tablet, Take 1 tablet by mouth Daily., Disp: , Rfl:     Cimetidine (TAGAMET PO), Take  by mouth Daily As Needed., Disp: , Rfl:     lisinopril-hydrochlorothiazide (PRINZIDE,ZESTORETIC) 20-12.5 MG per tablet, Take 0.5 tablets by mouth Daily. (Patient taking differently: Take 0.5 tablets by mouth Every Afternoon.), Disp: 45 tablet, Rfl: 2    Multiple Vitamins-Minerals (MULTIVITAMIN ADULT PO), Take 1 tablet by mouth Daily., Disp: , Rfl:     rosuvastatin (Crestor) 10 MG tablet, Take 1 tablet by mouth Every Night., Disp: 90 tablet, Rfl: 2    tacrolimus (PROGRAF) 1 MG capsule, Apply 1 capsule to the mouth or throat Take As Directed. Empty contents of 1 capsule into 1 L distilled water; Once dissolved, swish and spit up to 4 times a day., Disp: , Rfl:     Allergies   Allergen Reactions    Gluten Meal GI Intolerance    Wheat Diarrhea    Latex Rash     Tape and adhesive bandages cause rash     Milk-Related Compounds GI Intolerance       Review of Systems   Constitutional:  Negative for fever.   HENT:  Negative for voice change.    Eyes:  Negative for redness.   Respiratory:  Negative for shortness of breath.    Cardiovascular:  Negative for chest pain.   Gastrointestinal:  Negative for abdominal distention.   Genitourinary:  Negative for dysuria.   Musculoskeletal:  Positive for arthralgias, gait problem and joint swelling.   Skin:  Negative for color  "change and rash.   Neurological:  Negative for speech difficulty and weakness.   Psychiatric/Behavioral:  Negative for confusion.      I have reviewed the medical and surgical history, family history, social history, medications, and/or allergies, and the review of systems of this report.    PHYSICAL EXAMINATION:       /64 (BP Location: Left arm, Patient Position: Sitting, Cuff Size: Adult)   Temp 97.5 °F (36.4 °C)   Ht 165.1 cm (65\")   Wt 64.1 kg (141 lb 6.4 oz)   BMI 23.53 kg/m²     GENERAL [x] Well developed  []Ill appearing [x] No acute distress    HEENT [x]No acute changes [x] Normocephalic, atraumatic    NECK [x]Supple  [] No midline tenderness    LUNGS [x]Clear bilaterally [x]No wheezes []Rhonchi [] Rales    HEART [x] Regular rate  [x] Regular rhythm [] Irregular    ABDOMEN [x] Soft [x] Not tender [x] Not distended [x] Normal sounds    VAS/EXT [x] Normal Pulses []Edema []Cyanosis             SKIN [x] Warm [x]Dry []Pink []Ecchymosis []Cool    NEURO [x] Sensation Intact [x] Motor Intact [x] Pulse intact    MUSCULOSKELETAL [x]  Left knee AROM 5 - 100, mild swelling, varus deformity and collateral ligament imbalance.        Physical Exam  Ortho Exam   Neurologic Exam      DVT Screening: No Yes   Smoker [x] []   Personal/Family History of DVT or PE [x] []   Sedentary Lifestyle [x] []   BMI >30 [x] []      Tranexamic acid TXA criteria for usage during arthroplasty surgery.    Previous or Active DVT/PE: NO  Cardiac Stent within 1 year: NO  Embolic/Ischemic stroke within 1 year: NO  GFR less than 30ml/min (advanced kidney disease): NO  NOTE:  TXA  tranexemic acid summary: THIS PATIENT IS A CANDIDATE FOR IV TXA DURING SURGERY.     Independent Review of Radiographic Studies:    No new imaging done today.    Laboratory and Other Studies:  Pre-op labs and studies.     Medical Decision Making:    Limited progress with persistent and worsening symptoms.   Continue care plan with work-up and treatment as " noted.  Elective surgical treatment of chronic condition.  Medications as prescribed and only as tolerated.  Physical and occupational therapy planned.  Decision for surgery and patient counseling as noted in report.  Activity restrictions as appropriate.        Diagnoses and all orders for this visit:    1. Arthralgia of left knee (Primary)    2. Primary osteoarthritis of left knee    3. Acquired varus deformity of knee, left    4. History of total knee replacement, right       Assessment/Plan:    *SPECIAL INSTRUCTIONS:      Multi-modal analgesia.    Multi-modal DVT prophylaxis.   Tranexamic acid IV protocol (see screening parameters this report).    Rehabilitation PT/OT protocol with same day walker ambulation with therapist.  Post-discharge rehabilitation PT/OT planned.    Risks, benefits, and alternative treatments discussed with the patient: [x] Yes [] No    Risk benefits and merits of the proposed surgery were discussed and the patient's questions were answered risks discussed including and not limited to:  Anesthesia reactions  Blood loss and possible transfusion  Infection  Deep venous thrombosis and pulmonary embolus  Nerve, vascular or tendon injury  Fracture  Deformity  Stiffness  Weakness  Prosthesis and implant issues such as loosening, material wear or dislocation  Skin necrosis  Revision surgery or further treatment  Recurrence of problem and condition     Informed consent: [] Signed  [x] To be obtained at hospital  [] Both    Recommendations/Plan:  Discussion of orthopaedic goals and activities and patient expressed appreciation.  Risk, benefits, and merits of treatment alternatives reviewed with the patient and questions answered  Regular exercise as tolerated  Guided on proper techniques for mobility and conditioning exercises  The nature of the proposed surgery reviewed with patient including risks, benefits, rehabilitation, recovery time, and outcome expectations  Ice, heat, and/or modalities as  beneficial  Physical therapy program planned  Take prescribed medications as instructed only as tolerated  After care and dental prophylaxis for joint replacement prosthesis    Exercise, medications, injections, other patient advice, and return appointment as noted.  Brace: No brace was given at today's visit.  Referral: No referrals made at today's visit.  Test/Studies: Pre-op labs and tests.  Surgery: Surgery proposed at this visit as noted.  Work/Activity Status: Usual activities, routine exercise as tolerated, light physical work as tolerated, no strenuous activity.    PLANNED SURGICAL PROCEDURE:  Elective left total knee replacement.    Return in about 24 days (around 3/18/2024) for Post-op, recheck.  Patient is encouraged and agreeable to call or return sooner for any issues or concerns.      Electronically signed by John Dewitt MD at 02/23/24 1324                 John Dewitt MD at 02/23/24 0992          Sahara Hardwick is a 73 y.o. right hand dominant female referred by [unfilled]  is here today for a pre-operative history and physical discussion of treatment plans, surgery, and rehabilitation.  Pre-op Exam of the Left Knee (Left total knee arthroplasty scheduled 3/5/24)       CHIEF COMPLAINT:    Pre-operative evaluation with history and physical exam    History of Present Illness      Patient presents today for pre-operative exam prior to planned elective left total knee replacement   She reports chronic left knee pain, stiffness, occasional swelling and activity limitations persistent since her recent last office visit.  She has had treatment including arthritis exercises, medication and injection therapy.  Her most recent left knee injection on 9/26/23 provided only short term relief.  Patient has been ambulating with a left knee antalgic and varus deformity limp.  She is retired, remains active, and doing routine home exercises.  Patient wants to proceed with elective left total  knee replacement to treat left knee advanced osteoarthritis pain, stiffness and limitations.  She continues with continued pain relief and a good functional recovery after right total knee replacement in 2017.     PAST MEDICAL HISTORY:    Past Medical History:   Diagnosis Date    Adenomatous colon polyp 2024    Allergic     seasonal    Arthritis     Celiac disease     Colon polyp     Diverticulitis of colon 10 yrs ago    Diverticulosis     Elevated cholesterol     GERD (gastroesophageal reflux disease)     Hard of hearing     Hypertension     Kidney stone     Osteopenia     osteopenia    SCC (squamous cell carcinoma), leg, right     excision- derm     Sciatica 2019    Wears glasses        Past Surgical History:   Procedure Laterality Date    APPENDECTOMY      CHOLECYSTECTOMY      COLONOSCOPY      COLONOSCOPY N/A 2024    Procedure: COLONOSCOPY with biopsy and polypectomy;  Surgeon: Nakia Smallwood MD;  Location: [facility] ENDOSCOPY;  Service: Gastroenterology;  Laterality: N/A;    COLONOSCOPY W/ POLYPECTOMY       dr micheal corbett    ENDOSCOPY N/A 2024    Procedure: ESOPHAGOGASTRODUODENOSCOPY with biopsy;  Surgeon: Nakia Smallwood MD;  Location: [facility] ENDOSCOPY;  Service: Gastroenterology;  Laterality: N/A;    HAND SURGERY      right middle finger arthritis nodule removed.    [place] ARTHRP KNE CONDYLE&PLATU MEDIAL&LAT COMPARTMENTS Right 2017    Procedure: Elective right total knee replacement (BIOMET);  Surgeon: John Dewitt MD;  Location: [facility] OR;  Service: Orthopedics    TOTAL ABDOMINAL HYSTERECTOMY WITH SALPINGO OOPHORECTOMY         Family History   Problem Relation Age of Onset    Osteoarthritis Mother     Heart block Mother     Cancer Mother         breast/     Arthritis Mother     Thyroid disease Mother     Colon cancer Father         Diagnosed in his 60's    Heart block Father     Diabetes Father               Hyperlipidemia Father     Cancer Father         colon/ 1997    Heart disease Father     Coronary artery disease Other        Social History     Socioeconomic History    Marital status:    Tobacco Use    Smoking status: Never    Smokeless tobacco: Never   Vaping Use    Vaping Use: Never used   Substance and Sexual Activity    Alcohol use: Never    Drug use: Never    Sexual activity: Defer          Current Outpatient Medications:     acetaminophen (TYLENOL) 650 MG 8 hr tablet, Take 1 tablet by mouth Every 8 (Eight) Hours As Needed for Mild Pain., Disp: , Rfl:     Cholecalciferol (Vitamin D3) 50 MCG (2000 UT) tablet, Take 1 tablet by mouth Daily., Disp: , Rfl:     Cimetidine (TAGAMET PO), Take  by mouth Daily As Needed., Disp: , Rfl:     lisinopril-hydrochlorothiazide (PRINZIDE,ZESTORETIC) 20-12.5 MG per tablet, Take 0.5 tablets by mouth Daily. (Patient taking differently: Take 0.5 tablets by mouth Every Afternoon.), Disp: 45 tablet, Rfl: 2    Multiple Vitamins-Minerals (MULTIVITAMIN ADULT PO), Take 1 tablet by mouth Daily., Disp: , Rfl:     rosuvastatin (Crestor) 10 MG tablet, Take 1 tablet by mouth Every Night., Disp: 90 tablet, Rfl: 2    tacrolimus (PROGRAF) 1 MG capsule, Apply 1 capsule to the mouth or throat Take As Directed. Empty contents of 1 capsule into 1 L distilled water; Once dissolved, swish and spit up to 4 times a day., Disp: , Rfl:     Allergies   Allergen Reactions    Gluten Meal GI Intolerance    Wheat Diarrhea    Latex Rash     Tape and adhesive bandages cause rash     Milk-Related Compounds GI Intolerance       Review of Systems   Constitutional:  Negative for fever.   HENT:  Negative for voice change.    Eyes:  Negative for redness.   Respiratory:  Negative for shortness of breath.    Cardiovascular:  Negative for chest pain.   Gastrointestinal:  Negative for abdominal distention.   Genitourinary:  Negative for dysuria.   Musculoskeletal:  Positive for arthralgias, gait problem and  "joint swelling.   Skin:  Negative for color change and rash.   Neurological:  Negative for speech difficulty and weakness.   Psychiatric/Behavioral:  Negative for confusion.      I have reviewed the medical and surgical history, family history, social history, medications, and/or allergies, and the review of systems of this report.    PHYSICAL EXAMINATION:       /64 (BP Location: Left arm, Patient Position: Sitting, Cuff Size: Adult)   Temp 97.5 °F (36.4 °C)   Ht 165.1 cm (65\")   Wt 64.1 kg (141 lb 6.4 oz)   BMI 23.53 kg/m²     GENERAL [x] Well developed  []Ill appearing [x] No acute distress    HEENT [x]No acute changes [x] Normocephalic, atraumatic    NECK [x]Supple  [] No midline tenderness    LUNGS [x]Clear bilaterally [x]No wheezes []Rhonchi [] Rales    HEART [x] Regular rate  [x] Regular rhythm [] Irregular    ABDOMEN [x] Soft [x] Not tender [x] Not distended [x] Normal sounds    VAS/EXT [x] Normal Pulses []Edema []Cyanosis             SKIN [x] Warm [x]Dry []Pink []Ecchymosis []Cool    NEURO [x] Sensation Intact [x] Motor Intact [x] Pulse intact    MUSCULOSKELETAL [x]  Left knee AROM 5 - 100, mild swelling, varus deformity and collateral ligament imbalance.        Physical Exam  Ortho Exam   Neurologic Exam      DVT Screening: No Yes   Smoker [x] []   Personal/Family History of DVT or PE [x] []   Sedentary Lifestyle [x] []   BMI >30 [x] []      Tranexamic acid TXA criteria for usage during arthroplasty surgery.    Previous or Active DVT/PE: NO  Cardiac Stent within 1 year: NO  Embolic/Ischemic stroke within 1 year: NO  GFR less than 30ml/min (advanced kidney disease): NO  NOTE:  TXA  tranexemic acid summary: THIS PATIENT IS A CANDIDATE FOR IV TXA DURING SURGERY.     Independent Review of Radiographic Studies:    No new imaging done today.    Laboratory and Other Studies:  Pre-op labs and studies.     Medical Decision Making:    Limited progress with persistent and worsening symptoms.   Continue care " plan with work-up and treatment as noted.  Elective surgical treatment of chronic condition.  Medications as prescribed and only as tolerated.  Physical and occupational therapy planned.  Decision for surgery and patient counseling as noted in report.  Activity restrictions as appropriate.        Diagnoses and all orders for this visit:    1. Arthralgia of left knee (Primary)    2. Primary osteoarthritis of left knee    3. Acquired varus deformity of knee, left    4. History of total knee replacement, right       Assessment/Plan:    *SPECIAL INSTRUCTIONS:      Multi-modal analgesia.    Multi-modal DVT prophylaxis.   Tranexamic acid IV protocol (see screening parameters this report).    Rehabilitation PT/OT protocol with same day walker ambulation with therapist.  Post-discharge rehabilitation PT/OT planned.    Risks, benefits, and alternative treatments discussed with the patient: [x] Yes [] No    Risk benefits and merits of the proposed surgery were discussed and the patient's questions were answered risks discussed including and not limited to:  Anesthesia reactions  Blood loss and possible transfusion  Infection  Deep venous thrombosis and pulmonary embolus  Nerve, vascular or tendon injury  Fracture  Deformity  Stiffness  Weakness  Prosthesis and implant issues such as loosening, material wear or dislocation  Skin necrosis  Revision surgery or further treatment  Recurrence of problem and condition     Informed consent: [] Signed  [x] To be obtained at hospital  [] Both    Recommendations/Plan:  Discussion of orthopaedic goals and activities and patient expressed appreciation.  Risk, benefits, and merits of treatment alternatives reviewed with the patient and questions answered  Regular exercise as tolerated  Guided on proper techniques for mobility and conditioning exercises  The nature of the proposed surgery reviewed with patient including risks, benefits, rehabilitation, recovery time, and outcome  expectations  Ice, heat, and/or modalities as beneficial  Physical therapy program planned  Take prescribed medications as instructed only as tolerated  After care and dental prophylaxis for joint replacement prosthesis    Exercise, medications, injections, other patient advice, and return appointment as noted.  Brace: No brace was given at today's visit.  Referral: No referrals made at today's visit.  Test/Studies: Pre-op labs and tests.  Surgery: Surgery proposed at this visit as noted.  Work/Activity Status: Usual activities, routine exercise as tolerated, light physical work as tolerated, no strenuous activity.    PLANNED SURGICAL PROCEDURE:  Elective left total knee replacement.    Return in about 24 days (around 3/18/2024) for Post-op, recheck.  Patient is encouraged and agreeable to call or return sooner for any issues or concerns.      Electronically signed by John Dewitt MD at 02/23/24 [id number]

## 2024-03-05 NOTE — ANESTHESIA POSTPROCEDURE EVALUATION
Patient: Sahara Hardwick    Procedure Summary       Date: 03/05/24 Room / Location: [facility] OR  /  [facility]    Anesthesia Start: 1055 Anesthesia Stop: 1328    Procedure: Elective left total knee arthroplasty (Left: Knee) Diagnosis:       Arthralgia of left knee      Primary osteoarthritis of left knee      (Arthralgia of left knee [M25.562])      (Primary osteoarthritis of left knee [M17.12])    Surgeons: John Dewitt MD Provider: Jacob Gomez CRNA    Anesthesia Type: spinal, regional ASA Status: 3            Anesthesia Type: spinal, regional    Vitals  No vitals data found for the desired time range.          Post Anesthesia Care and Evaluation    Patient location during evaluation: PACU  Patient participation: complete - patient participated  Level of consciousness: awake and alert  Pain score: 2  Pain management: satisfactory to patient    Airway patency: patent  Anesthetic complications: No anesthetic complications  PONV Status: none  Cardiovascular status: acceptable and stable  Respiratory status: acceptable  Hydration status: acceptable    Comments: Vitals signs as noted in nursing documentation as per protocol.

## 2024-03-05 NOTE — OP NOTE
[place] [address], [address]  [place]  [address] [phone]      OPERATIVE REPORT      PATIENT NAME:  Sahara Hardwick                            YOB: 1950                     PREOP DIAGNOSIS:  Left knee advanced degenerative joint disease    POSTOP DIAGNOSIS: Same.    PROCEDURE:  Left total knee replacement    Surgical Approach:   Knee Medial Parapatellar    SURGEON:   John Dewitt M.D.    FIRST ASSIST:   Circulator: German Parekh RN; Khadra Gross RN  Scrub Person: Justo Ventura; Debbi Rios  Vendor Representative: James Woods ([employer])  Assistant: Dustin Jorgensen    ANESTHETIST:  CRNA: Jacob Gomez CRNA; Alan Garzon CRNA    ANESTHESIA:  Spinal plus knee posterior intracapsular block.    FLUIDS:   1500 ml crystalloid    ESTIMATED BLOOD LOSS: 150 ml    URINE OUTPUT:  50 ml    SPECIMENS:   Knee bone cuts sent to Pathology.    DRAINS:   Benjamin to gravity    TOURNIQUET TIME:  73 min @ 275mm Hg    HARDWARE:      A Biomet Vanguard cemented cruciate retaining total knee replacement with a size 60 mm cobalt chrome femoral component, a size 71 mm tibial component, 16 mm CR anterior stabilized AS polyethylene liner, and a 31 mm x 6.2 mm polyethylene patella    FINDINGS:                             End-stage DJD, osteophytes, erosions, cysts, bone-on-bone wear, three compartment involvement, significant varus deformity, extensor lag and ligament imbalance.    COMPLICATIONS:  None.    DISPOSITION:  Stable to recovery.     INDICATIONS NARRATIVE: Patient presents for elective knee arthroplasty to address painful intractable advanced degenerative joint disease with knee swelling, stiffness, deformity, instability and dysfunction.  The patient presents for planned elective total knee replacement.  Risks and benefits of the surgery were discussed and informed consent was obtained with the patient.  Risks discussed including but not limited to anesthesia,  infection, nerve/vessel/tendon injury, fracture, prosthetic loosening or wear, blood loss and possible need for transfusion, DVT and pulmonary embolus.  Goals include pain relief, improved knee alignment, improved knee range of motion and better function for ambulation and activities.      Antibiotic prophylaxis was given.  Tranexamic acid protocol followed.  Surgeon site marking and a time out were performed prior to the procedure.  Anesthesia was effective and well tolerated.  The knee was prepped and draped in the usual sterile fashion.  A padded thigh tourniquet was placed for the procedure.    After sterile prep and drape, an anterior incision was made at the knee.  A medial para-patella arthrotomy was made and dissection continued proximally along the medial one-third of the quadriceps tendon and distally along the medial border of the patella tendon.  A partial inferior patella fat pad release was performed as well as a superior synovectomy.   Next, steps were taken to prepare the femoral, tibial and patella surfaces for the knee replacement implant.  Care was taken to incorporate 4 degrees of valgus and 3 degrees of external rotation in the femoral cut, neutral varus/valgus, neutral to slight external rotation and 3-5 degrees of posterior slope of the tibial cut, and the patella cut prepared to restore the original thickness with implants.  Equal flexion and extension gaps were achieved.  The minimally invasive instruments, sizing templates, cutting blocks, and guides were used together with radiographic x-ray templates.  The knee trial components achieved good alignment, fit, motion and stability. Soft tissue balancing was achieved in flexion and extension. The bone surfaces were thoroughly irrigated with antibiotic pulsed irrigation.  The entry to the femoral canal was closed with a bone plug.      Next, using standard cement technique including vacuum mixing, centrifugation and pressurization, the actual  prosthetic components as described above were cemented in place.  Again, a trial liner was placed to check for the best range of motion and stability of the knee.  The trial was removed and the actual polyethylene liner was placed onto the tibial tray and secured with the locking mechanism.  The knee was again taken through a final range of motion, alignment and stability check which was excellent.  There was also excellent patella tracking through the entire range of motion. The knee and leg showed good alignment.  Prior to placing the final implants, a posterior intracapsular injection was given via an 18 gauge spinal needle and consisting of 30 mg Toradol in one ml, 10 mg Duramorph in ten ml and 15 ml of 0.5% ropivacaine.  The tourniquet was taken down and there was good hemostasis.  Minor venous bleeding was controlled with electrocautery.    The wound was irrigated copiously again as well as throughout the procedure and during closure with pulse lavage antibiotic irrigation.  Routine closure was performed consisting of interrupted figure-of-eight #1 Vicryl for the extensor mechanism, 2-0 Vicryl for deep and superficial subcutaneous layers and skin staples.  A sterile Aquacel dressing was applied.  Anesthesia was effective and well tolerated.  There were no complications with the procedure.  The patient was transferred stable to recovery.      John Dewitt MD  3/5/2024  13:43 EST

## 2024-03-05 NOTE — ANESTHESIA PREPROCEDURE EVALUATION
Anesthesia Evaluation     Patient summary reviewed and Nursing notes reviewed   no history of anesthetic complications:   NPO Solid Status: > 8 hours  NPO Liquid Status: > 8 hours           Airway   Mallampati: II  TM distance: >3 FB  Neck ROM: full  no difficulty expected  Dental - normal exam     Pulmonary - negative pulmonary ROS and normal exam    breath sounds clear to auscultation  (-) not a smoker  Cardiovascular - normal exam  Exercise tolerance: good (4-7 METS)    ECG reviewed  Rhythm: regular  Rate: normal    (+) hypertension well controlled, hyperlipidemia    ROS comment: Test Reason : pre-op surgery  Blood Pressure :   */*   mmHG  Vent. Rate :  64 BPM     Atrial Rate :  64 BPM     P-R Int : 124 ms          QRS Dur :  80 ms      QT Int : 408 ms       P-R-T Axes :  62  36  64 degrees     QTc Int : 420 ms     Normal sinus rhythm  Possible Left atrial enlargement  Borderline ECG  No previous ECGs available  Confirmed by WILLIAM العلي ([id number]) on 2/26/2024 11:17:44 AM     Referred By: CERVONI           Confirmed By: WILLIAM العلي      Neuro/Psych  (+) numbness  GI/Hepatic/Renal/Endo    (+) GERD well controlled, renal disease- stones    Musculoskeletal     Abdominal    Substance History - negative use     OB/GYN negative ob/gyn ROS         Other   arthritis,   history of cancer (skin)                  Anesthesia Plan    ASA 3     spinal and regional     (Risks and benefits discussed including risk of aspiration, recall and dental damage. All patient questions answered.    Will continue with plan of care.)  intravenous induction     Anesthetic plan, risks, benefits, and alternatives have been provided, discussed and informed consent has been obtained with: patient.  Pre-procedure education provided  Plan discussed with CRNA.

## 2024-03-05 NOTE — CASE MANAGEMENT/SOCIAL WORK
Case Management/Social Work    Patient Name:  Sahara Hardwick  YOB: 1950  MRN: [id number]  Admit Date:  3/5/2024        SW spoke with pts spouse via telephone. He reports pt already has a walker. No preference on HH. KE sent referral to [facility].     14:09 EST [facility] unable to accept. KE sent referral to [facility].     15:17 EST [facility] able to accept pt for services.       Electronically signed by:  DALJIT Benjamin  03/05/24 13:54 EST

## 2024-03-05 NOTE — INTERVAL H&P NOTE
H&P reviewed. The patient was examined and there are no changes to the H&P.    Vitals:    03/05/24 0918   BP: 129/70   Pulse: 74   Resp: 16   Temp: 97.1 °F (36.2 °C)   SpO2: 98%       John Dewitt MD  3/5/2024  10:53 EST

## 2024-03-05 NOTE — THERAPY DISCHARGE NOTE
Patient Name: Sahara Hardwick  : 1950    MRN: [id number]                              Today's Date: 3/5/2024       Admit Date: 3/5/2024    Visit Dx:     ICD-10-CM ICD-9-CM   1. Status post total knee replacement using cement, left  Z96.652 V43.65   2. Arthralgia of left knee  M25.562 719.46   3. Primary osteoarthritis of left knee  M17.12 715.16     Patient Active Problem List   Diagnosis    Primary osteoarthritis of right knee    Benign essential hypertension    Mixed hyperlipidemia    Celiac disease    Personal history of colonic polyps    Left lower quadrant abdominal pain    Change in bowel habits    Gastroesophageal reflux disease without esophagitis    Arthralgia of left knee    Primary osteoarthritis of left knee    Status post total knee replacement using cement, left     Past Medical History:   Diagnosis Date    Adenomatous colon polyp 2024    Allergic     seasonal    Arthritis     Celiac disease     Colon polyp     Diverticulitis of colon 10 yrs ago    Diverticulosis     Elevated cholesterol     GERD (gastroesophageal reflux disease)     Hard of hearing     Hypertension     Kidney stone     Osteopenia     osteopenia    SCC (squamous cell carcinoma), leg, right     excision- derm     Sciatica 2019    Wears glasses      Past Surgical History:   Procedure Laterality Date    APPENDECTOMY      CHOLECYSTECTOMY      COLONOSCOPY      COLONOSCOPY N/A 2024    Procedure: COLONOSCOPY with biopsy and polypectomy;  Surgeon: Nakia Smallwood MD;  Location: [facility] ENDOSCOPY;  Service: Gastroenterology;  Laterality: N/A;    COLONOSCOPY W/ POLYPECTOMY       dr micheal corbett    ENDOSCOPY N/A 2024    Procedure: ESOPHAGOGASTRODUODENOSCOPY with biopsy;  Surgeon: Nakia Smallwood MD;  Location: [facility] ENDOSCOPY;  Service: Gastroenterology;  Laterality: N/A;    HAND SURGERY      right middle finger arthritis nodule removed.    MS ARTHRP ANALY  CONDYLE&PLATU MEDIAL&LAT COMPARTMENTS Right 08/01/2017    Procedure: Elective right total knee replacement (BIOMET);  Surgeon: John Dewitt MD;  Location: [facility];  Service: Orthopedics    TOTAL ABDOMINAL HYSTERECTOMY WITH SALPINGO OOPHORECTOMY  1991      General Information       Row Name 03/05/24 1544          Physical Therapy Time and Intention    Document Type discharge evaluation/summary (P)   -KB     Mode of Treatment physical therapy (P)   -KB       Row Name 03/05/24 1544          General Information    Patient Profile Reviewed yes (P)   -KB     Prior Level of Function independent:;all household mobility (P)   -KB     Existing Precautions/Restrictions fall (P)   -KB     Barriers to Rehab none identified (P)   -KB       Row Name 03/05/24 1544          Living Environment    People in Home spouse (P)   -KB       Row Name 03/05/24 1544          Home Main Entrance    Number of Stairs, Main Entrance two (P)   -KB       Row Name 03/05/24 1544          Cognition    Orientation Status (Cognition) oriented x 4 (P)   -KB               User Key  (r) = Recorded By, (t) = Taken By, (c) = Cosigned By      Initials Name Provider Type    Luna Magana, PT Student PT Student                   Mobility       Row Name 03/05/24 1546          Bed Mobility    Bed Mobility bed mobility (all) activities (P)   -KB     All Activities, Love (Bed Mobility) independent (P)   -KB     Assistive Device (Bed Mobility) head of bed elevated (P)   -KB       Row Name 03/05/24 1546          Sit-Stand Transfer    Sit-Stand Love (Transfers) modified independence (P)   -KB     Assistive Device (Sit-Stand Transfers) walker, front-wheeled (P)   -KB       Row Name 03/05/24 1546          Gait/Stairs (Locomotion)    Love Level (Gait) standby assist;modified independence (P)   -KB     Assistive Device (Gait) walker, front-wheeled (P)   -KB     Patient was able to Ambulate yes (P)   -KB     Distance in Feet (Gait) 50 (P)    -               User Key  (r) = Recorded By, (t) = Taken By, (c) = Cosigned By      Initials Name Provider Type    Luna Magana, PT Student PT Student                   Obj/Interventions       Row Name 03/05/24 1547          Range of Motion Comprehensive    General Range of Motion bilateral lower extremity ROM WFL;lower extremity range of motion deficits identified (P)   -KB     Comment, General Range of Motion LLE knee surgery (P)   -KB       Row Name 03/05/24 1547          Strength Comprehensive (MMT)    General Manual Muscle Testing (MMT) Assessment lower extremity strength deficits identified (P)   -     Comment, General Manual Muscle Testing (MMT) Assessment grossly 3+/5 (P)   -       Row Name 03/05/24 1547          Balance    Balance Assessment sitting static balance;sitting dynamic balance;sit to stand dynamic balance;standing static balance;standing dynamic balance (P)   -     Static Sitting Balance modified independence (P)   -KB     Dynamic Sitting Balance modified independence (P)   -     Position, Sitting Balance sitting edge of bed;unsupported (P)   -     Sit to Stand Dynamic Balance contact guard (P)   -     Static Standing Balance standby assist (P)   -KB     Dynamic Standing Balance standby assist (P)   -KB     Position/Device Used, Standing Balance walker, front-wheeled (P)   -       Row Name 03/05/24 1547          Sensory Assessment (Somatosensory)    Sensory Assessment (Somatosensory) sensation intact (P)   -[facility] Name 03/05/24 1547          General Lower Extremity Assessment (Range of Motion)    Lower Extremity: Range of Motion LLE ROM WFL;RLE ROM WFL (P)   -       Row Name 03/05/24 1547          Lower Extremity (Manual Muscle Testing)    Lower Extremity: Manual Muscle Testing (MMT) left LE strength is WFL;right LE strength is WFL (P)   -               User Key  (r) = Recorded By, (t) = Taken By, (c) = Cosigned By      Initials Name Provider Type    Luna Magana,  PT Student PT Student                   Goals/Plan    No documentation.                  Clinical Impression       Row Name 03/05/24 1548          Pain    Pretreatment Pain Rating 0/10 - no pain (P)   -KB     Posttreatment Pain Rating 0/10 - no pain (P)   -KB       Row Name 03/05/24 1548          Plan of Care Review    Plan of Care Reviewed With patient (P)   -KB     Progress no change (P)   -KB     Outcome Evaluation PT eval completed today. Patient oriented x4 with  at bedside denies any pain. Patient performed supine to sit with SBA, ambulated 50 ft with SBA with front wheeled walker. Patient able to DC home safely, and was educated on importance of mobility during healing process and walker placement during ambulation. Patient left with nsg to get ready for DC,  at bedside, call button within reach. (P)   -KB       Row Name 03/05/24 1543          Therapy Assessment/Plan (PT)    Therapy Frequency (PT) evaluation only (P)   -KB       Row Name 03/05/24 1546          Vital Signs    O2 Delivery Pre Treatment room air (P)   -KB     O2 Delivery Intra Treatment room air (P)   -KB     O2 Delivery Post Treatment room air (P)   -KB     Pre Patient Position Supine (P)   -KB     Intra Patient Position Standing (P)   -KB     Post Patient Position Sitting (P)   -KB       Row Name 03/05/24 1542          Positioning and Restraints    Pre-Treatment Position in bed (P)   -KB     Post Treatment Position bed (P)   -KB     In Bed with family/caregiver;fowlers;call light within reach;with nsg (P)   -KB               User Key  (r) = Recorded By, (t) = Taken By, (c) = Cosigned By      Initials Name Provider Type    Luna Magana, PT Student PT Student                   Outcome Measures       Row Name 03/05/24 3537          How much help from another person do you currently need...    Turning from your back to your side while in flat bed without using bedrails? 3 (P)   -KB     Moving from lying on back to sitting on the  side of a flat bed without bedrails? 3 (P)   -KB     Moving to and from a bed to a chair (including a wheelchair)? 3 (P)   -KB     Standing up from a chair using your arms (e.g., wheelchair, bedside chair)? 4 (P)   -KB     Climbing 3-5 steps with a railing? 3 (P)   -KB     To walk in hospital room? 4 (P)   -KB     AM-PAC 6 Clicks Score (PT) 20 (P)   -KB     Highest Level of Mobility Goal 6 --> Walk 10 steps or more (P)   -KB       Row Name 03/05/24 155          Functional Assessment    Outcome Measure Options AM-PAC 6 Clicks Basic Mobility (PT) (P)   -KB               User Key  (r) = Recorded By, (t) = Taken By, (c) = Cosigned By      Initials Name Provider Type     Luna Aguila, PT Student PT Student                  Physical Therapy Education       Title: PT OT SLP Therapies (Done)       Topic: Physical Therapy (Done)       Point: Mobility training (Done)       Learning Progress Summary             Patient Eager, E, VU by  at 3/5/2024 1553   Family Eager, E, VU by  at 3/5/2024 1553                         Point: Home exercise program (Done)       Learning Progress Summary             Patient Eager, E, VU by  at 3/5/2024 1553   Family Eager, E, VU by  at 3/5/2024 1553                         Point: Body mechanics (Done)       Learning Progress Summary             Patient Eager, E, VU by KB at 3/5/2024 1553   Family Eager, E, VU by  at 3/5/2024 1553                         Point: Precautions (Done)       Learning Progress Summary             Patient Eager, E, VU by  at 3/5/2024 1553   Family Eager, E, VU by  at 3/5/2024 1553                                         User Key       Initials Effective Dates Name Provider Type [facility] 01/18/24 -  Luna Aguila, PT Student PT Student PT                  PT Recommendation and Plan     Plan of Care Reviewed With: (P) patient  Progress: (P) no change  Outcome Evaluation: (P) PT eval completed today. Patient oriented x4 with  at bedside denies  any pain. Patient performed supine to sit with SBA, ambulated 50 ft with SBA with front wheeled walker. Patient able to DC home safely, and was educated on importance of mobility during healing process and walker placement during ambulation. Patient left with nsg to get ready for DC,  at bedside, call button within reach.     Time Calculation:   PT Evaluation Complexity  History, PT Evaluation Complexity: (P) 1-2 personal factors and/or comorbidities  Examination of Body Systems (PT Eval Complexity): (P) 1-2 elements  Clinical Presentation (PT Evaluation Complexity): (P) stable  Clinical Decision Making (PT Evaluation Complexity): (P) low complexity  Overall Complexity (PT Evaluation Complexity): (P) low complexity     PT Charges       Row Name 03/05/24 1553             Time Calculation    Start Time 1500 (P)   -KB      PT Received On 03/05/24 (P)   -KB      PT Goal Re-Cert Due Date 03/15/24 (P)   -KB         Untimed Charges    PT Eval/Re-eval Minutes 25 (P)   -KB         Total Minutes    Untimed Charges Total Minutes 25 (P)   -KB       Total Minutes 25 (P)   -KB                User Key  (r) = Recorded By, (t) = Taken By, (c) = Cosigned By      Initials Name Provider Type    Luna Magana, PT Student PT Student                  Therapy Charges for Today       Code Description Service Date Service Provider Modifiers Qty    98576033240  PT EVAL LOW COMPLEXITY 2 3/5/2024 Luna Aguila, PT Student GP 1            PT G-Codes  Outcome Measure Options: (P) AM-PAC 6 Clicks Basic Mobility (PT)  AM-PAC 6 Clicks Score (PT): (P) 20    PT Discharge Summary  Anticipated Discharge Disposition (PT): (P) home, home with assist    Luna Aguila, PT Student  3/5/2024

## 2024-03-06 NOTE — PROGRESS NOTES
MARTY James    Nerve Cath Post Op Call    Patient Name: Sahara Hardwick  :  1950  MRN:  [id number]  Date of Discharge: 3/5/2024    Nerve Cath Post Op Call:    Analgesia:Excellent  Pain Score:2/10  Side Effects:None  Catheter Site:clean  Patient Controlled ON Q pump infusion rate: 6ml/hr  Catheter Plan: Will continue with plan at home without changes

## 2024-03-07 LAB — REF LAB TEST METHOD: NORMAL

## 2024-03-07 NOTE — PROGRESS NOTES
MARTY James    Nerve Cath Post Op Call    Patient Name: Sahara Hardwick  :  1950  MRN:  [id number]  Date of Discharge: 3/5/2024    Nerve Cath Post Op Call:    Analgesia:Good  Pain Score:4/10  Side Effects:None  Catheter Site:clean  Catheter Plan: Will continue with plan at home without changes

## 2024-03-08 ENCOUNTER — TELEPHONE (OUTPATIENT)
Dept: ORTHOPEDIC SURGERY | Facility: CLINIC | Age: 74
End: 2024-03-08
Payer: MEDICARE

## 2024-03-08 NOTE — PROGRESS NOTES
MARTY James    Nerve Cath Post Op Call    Patient Name: Sahara Hardwick  :  1950  MRN:  [id number]  Date of Discharge: 3/5/2024    Nerve Cath Post Op Call:    Analgesia:Excellent  Pain Score:2/10  Side Effects:None  Catheter Site:clean  Patient Controlled ON Q pump infusion rate: 6ml/hr  Catheter Plan: Patient/Family member was instructed to remove the catheter during telephone contact The patient was instructed to call ON CALL Anesthesia provider for any questions or problems

## 2024-03-18 ENCOUNTER — OFFICE VISIT (OUTPATIENT)
Dept: ORTHOPEDIC SURGERY | Facility: CLINIC | Age: 74
End: 2024-03-18
Payer: MEDICARE

## 2024-03-18 VITALS — TEMPERATURE: 97.7 F | WEIGHT: 140.4 LBS | BODY MASS INDEX: 23.39 KG/M2 | HEIGHT: 65 IN

## 2024-03-18 DIAGNOSIS — Z96.651 HISTORY OF TOTAL KNEE REPLACEMENT, RIGHT: ICD-10-CM

## 2024-03-18 DIAGNOSIS — Z96.652 STATUS POST TOTAL KNEE REPLACEMENT USING CEMENT, LEFT: Primary | ICD-10-CM

## 2024-03-18 PROCEDURE — 99024 POSTOP FOLLOW-UP VISIT: CPT | Performed by: ORTHOPAEDIC SURGERY

## 2024-03-18 PROCEDURE — 1160F RVW MEDS BY RX/DR IN RCRD: CPT | Performed by: ORTHOPAEDIC SURGERY

## 2024-03-18 PROCEDURE — 1159F MED LIST DOCD IN RCRD: CPT | Performed by: ORTHOPAEDIC SURGERY

## 2024-03-18 RX ORDER — HYDROCODONE BITARTRATE AND ACETAMINOPHEN 7.5; 325 MG/1; MG/1
1 TABLET ORAL EVERY 8 HOURS PRN
Qty: 21 TABLET | Refills: 0 | Status: SHIPPED | OUTPATIENT
Start: 2024-03-18

## 2024-03-18 NOTE — PROGRESS NOTES
Subjective   Patient ID: Sahara Hardwick is a 73 y.o. right hand dominant female is here today for a post-operative visit.  Post-op of the Left Knee (Elective left total knee arthroplasty 3/5/24)       CHIEF COMPLAINT:    First post-op evaluation    History of Present Illness      Pain controlled: [] no   [x] yes   Medication refill requested: [x] no   [] yes    Patient compliant with instructions: [] no   [x] yes   Other: Reports good progress since surgery.  She has improved steadily and with stable walker assist ambulation.     Past Medical History:   Diagnosis Date    Adenomatous colon polyp 01/03/2024    Allergic 1995    seasonal    Arthritis     Celiac disease     Colon polyp     Diverticulitis of colon 10 yrs ago    Diverticulosis 2014    Elevated cholesterol     GERD (gastroesophageal reflux disease)     Hard of hearing     Hypertension     Kidney stone 1980    Osteopenia 2018    osteopenia    SCC (squamous cell carcinoma), leg, right     excision- derm 2021    Sciatica 04/2019    Wears glasses         Past Surgical History:   Procedure Laterality Date    APPENDECTOMY  1992    CHOLECYSTECTOMY  1992    COLONOSCOPY  2009    COLONOSCOPY N/A 01/03/2024    Procedure: COLONOSCOPY with biopsy and polypectomy;  Surgeon: Nakia Smallwood MD;  Location: [facility];  Service: Gastroenterology;  Laterality: N/A;    COLONOSCOPY W/ POLYPECTOMY      2019 dr micheal corbett    ENDOSCOPY N/A 01/03/2024    Procedure: ESOPHAGOGASTRODUODENOSCOPY with biopsy;  Surgeon: Nakia Smallwood MD;  Location: [facility];  Service: Gastroenterology;  Laterality: N/A;    HAND SURGERY  2015    right middle finger arthritis nodule removed.    [place] ARTHRP KNE CONDYLE&PLATU MEDIAL&LAT COMPARTMENTS Right 08/01/2017    Procedure: Elective right total knee replacement (BIOMET);  Surgeon: John Dewitt MD;  Location: [facility] OR;  Service: Orthopedics    TOTAL ABDOMINAL HYSTERECTOMY WITH SALPINGO OOPHORECTOMY  1991     "TOTAL KNEE ARTHROPLASTY Left 3/5/2024    Procedure: Elective left total knee arthroplasty;  Surgeon: John Dewitt MD;  Location: [facility];  Service: Orthopedics;  Laterality: Left;       Allergies   Allergen Reactions    Gluten Meal GI Intolerance    Wheat Diarrhea    Latex Rash     Tape and adhesive bandages cause rash     Milk-Related Compounds GI Intolerance       Review of Systems   Constitutional:  Negative for fever.   Musculoskeletal:  Positive for arthralgias and joint swelling (decreased).   Skin:  Negative for color change and rash.   Psychiatric/Behavioral:  Negative for confusion.      I have reviewed the medical and surgical history, family history, social history, medications, and/or allergies, and the review of systems of this report.    Objective   Temp 97.7 °F (36.5 °C)   Ht 165.1 cm (65\")   Wt 63.7 kg (140 lb 6.4 oz)   LMP  (LMP Unknown)   BMI 23.36 kg/m²       Signs of infection: [x] no                    [] yes   Drainage: [x] no                    [] yes   Incision: [x] healing well     []healed well   Motor exam intact: [] no                    [x] yes   Neurovascular exam intact: [] no                    [x] yes   Signs of compartment syndrome: [x] no                    [] yes   Signs of DVT: [x] no                    [] yes   Other: Left knee AROM 5 - 80 degrees, mild soft swelling, ligament exam stable.  Extensor mechanism intact.  Castillo sign negative.     Physical Exam  Ortho Exam    Neurologic Exam    Assessment & Plan     Independent Review of Radiographic Studies:    Standing AP and lateral of the left knee, indication to evaluate status of prosthesis and joint, and compared with prior imaging, shows no acute fracture or dislocation. Good position and alignment of prosthesis with no radiographic signs of loosening.  There is mild increased posterior slope measuring 11.9 degrees on the lateral view.  This can be accommodated and neutralized by the added constraint of the " anterior stabilized liner design chosen.  The imaging findings reviewed with patient and her  and their questions answered.    Laboratory and Other Studies:  No new results reviewed today.     Medical Decision Making:    No complications of procedure and treatments.  Stable post-operative exam and expected early progress after left total knee replacement.  Continued excellent recovery after right total knee replacement in 2017.  Some ecchymosis of posterior calf and medial arch margin of foot that has gradually lessened, and no hematoma formation.  Reviewed risks and benefits of post-operative anticoagulation therapy and minimizing risks for any DVT blood clots.  Patient will continue anticoagulation therapy for two more weeks per protocol as tolerated.  Pain medication refill with taper given.  Physical and occupational therapy referral given and per total knee replacement protocol.  Walker, part-time knee open-hinged support brace, transition to cane then independent ambulation as tolerated.  Reviewed rehabilitation plan with patient.     Procedures     Diagnoses and all orders for this visit:    1. Status post total knee replacement using cement, left (Primary)  -     HYDROcodone-acetaminophen (NORCO) 7.5-325 MG per tablet; Take 1 tablet by mouth Every 8 (Eight) Hours As Needed for Moderate Pain (prn post op pain).  Dispense: 21 tablet; Refill: 0  -     Ambulatory Referral to Physical Therapy Evaluate and treat, POST OP, Ortho; ROM, Strengthening; (as tolerated)  -     XR Knee 1 or 2 View Left; Future    2. History of total knee replacement, right       Recommendations/Plan:     [x] Staples    [] Sutures [x] Removed today  [] At prior visit later   Physical therapy: [x]  Home PT/OT currently  [x]outpatient referral given  [x] therapy ongoing   Ultrasound: [x]not ordered         []order given to patient   Labs: [x]not ordered         []order given to patient   Weight Bearing status: []Full [x]WBAT []PWB  []NWB []Other     Discussion of orthopaedic goals and activities and patient expressed appreciation.  Regular exercise as tolerated  Guided on proper techniques for mobility and conditioning exercises  Weight bearing parameters reviewed  Take prescribed medications as instructed only as tolerated     Exercise, medications other patient advice, and return appointment as noted.  Brace: No brace was given at today's visit.  Referral: Physical and Occupational Therapy referral.  Test/Studies: No additional studies ordered at this time.  Work/Activity Status: Usual activities, routine exercise as tolerated, no strenuous activity.    Return in about 8 weeks (around 5/13/2024) for Recheck.  Patient is encouraged and agreeable to call or return sooner for any issues or concerns.

## 2024-03-26 DIAGNOSIS — Z96.652 STATUS POST TOTAL KNEE REPLACEMENT USING CEMENT, LEFT: ICD-10-CM

## 2024-03-26 DIAGNOSIS — Z96.652 STATUS POST TOTAL KNEE REPLACEMENT USING CEMENT, LEFT: Primary | ICD-10-CM

## 2024-03-26 RX ORDER — HYDROCODONE BITARTRATE AND ACETAMINOPHEN 7.5; 325 MG/1; MG/1
1 TABLET ORAL EVERY 12 HOURS PRN
Qty: 14 TABLET | Refills: 0 | Status: SHIPPED | OUTPATIENT
Start: 2024-03-26

## 2024-03-26 RX ORDER — HYDROCODONE BITARTRATE AND ACETAMINOPHEN 7.5; 325 MG/1; MG/1
1 TABLET ORAL EVERY 8 HOURS PRN
Qty: 21 TABLET | Refills: 0 | OUTPATIENT
Start: 2024-03-26

## 2024-05-07 ENCOUNTER — TREATMENT (OUTPATIENT)
Dept: PHYSICAL THERAPY | Facility: CLINIC | Age: 74
End: 2024-05-07
Payer: MEDICARE

## 2024-05-07 DIAGNOSIS — Z96.652 TOTAL KNEE REPLACEMENT STATUS, LEFT: Primary | ICD-10-CM

## 2024-05-13 ENCOUNTER — OFFICE VISIT (OUTPATIENT)
Dept: ORTHOPEDIC SURGERY | Facility: CLINIC | Age: 74
End: 2024-05-13
Payer: MEDICARE

## 2024-05-13 VITALS — WEIGHT: 139.6 LBS | HEIGHT: 65 IN | BODY MASS INDEX: 23.26 KG/M2 | TEMPERATURE: 97.7 F

## 2024-05-13 DIAGNOSIS — Z96.652 STATUS POST TOTAL KNEE REPLACEMENT USING CEMENT, LEFT: Primary | ICD-10-CM

## 2024-05-13 DIAGNOSIS — Z96.651 HISTORY OF TOTAL KNEE REPLACEMENT, RIGHT: ICD-10-CM

## 2024-05-13 PROCEDURE — 1159F MED LIST DOCD IN RCRD: CPT | Performed by: ORTHOPAEDIC SURGERY

## 2024-05-13 PROCEDURE — 99024 POSTOP FOLLOW-UP VISIT: CPT | Performed by: ORTHOPAEDIC SURGERY

## 2024-05-13 PROCEDURE — 1160F RVW MEDS BY RX/DR IN RCRD: CPT | Performed by: ORTHOPAEDIC SURGERY

## 2024-05-13 NOTE — PROGRESS NOTES
Subjective   Patient ID: Sahara Hardwick is a 73 y.o. right hand dominant female is here today for a post-operative visit.  Post-op of the Left Knee (Status post left total knee replacement 3/5/2024)       CHIEF COMPLAINT:    Subsequent post-op evaluation    History of Present Illness      Pain controlled: [] no   [x] yes   Medication refill requested: [x] no   [] yes    Patient compliant with instructions: [] no   [x] yes   Other: Reports good progress since surgery. She states she only has occasional, mild pain, concerned mostly with residual swelling that has gradually decreased.     Past Medical History:   Diagnosis Date    Adenomatous colon polyp 01/03/2024    Allergic 1995    seasonal    Arthritis     Celiac disease     Colon polyp     Diverticulitis of colon 10 yrs ago    Diverticulosis 2014    Elevated cholesterol     GERD (gastroesophageal reflux disease)     Hard of hearing     Hypertension     Kidney stone 1980    Osteopenia 2018    osteopenia    SCC (squamous cell carcinoma), leg, right     excision- derm 2021    Sciatica 04/2019    Wears glasses         Past Surgical History:   Procedure Laterality Date    APPENDECTOMY  1992    CHOLECYSTECTOMY  1992    COLONOSCOPY  2009    COLONOSCOPY N/A 01/03/2024    Procedure: COLONOSCOPY with biopsy and polypectomy;  Surgeon: Nakia Smallwood MD;  Location: [facility] ENDOSCOPY;  Service: Gastroenterology;  Laterality: N/A;    COLONOSCOPY W/ POLYPECTOMY      2019 dr micheal corbett    ENDOSCOPY N/A 01/03/2024    Procedure: ESOPHAGOGASTRODUODENOSCOPY with biopsy;  Surgeon: Nakia Smallwood MD;  Location: [facility] ENDOSCOPY;  Service: Gastroenterology;  Laterality: N/A;    HAND SURGERY  2015    right middle finger arthritis nodule removed.    [place] ARTHRP KNE CONDYLE&PLATU MEDIAL&LAT COMPARTMENTS Right 08/01/2017    Procedure: Elective right total knee replacement (BIOMET);  Surgeon: John Dewitt MD;  Location: [facility] OR;  Service: Orthopedics    TOTAL  "ABDOMINAL HYSTERECTOMY WITH SALPINGO OOPHORECTOMY  1991    TOTAL KNEE ARTHROPLASTY Left 3/5/2024    Procedure: Elective left total knee arthroplasty;  Surgeon: John Dewitt MD;  Location: [facility];  Service: Orthopedics;  Laterality: Left;       Allergies   Allergen Reactions    Gluten Meal GI Intolerance    Wheat Diarrhea    Latex Rash     Tape and adhesive bandages cause rash     Milk-Related Compounds GI Intolerance       Review of Systems   Constitutional:  Negative for fever.   Musculoskeletal:  Positive for arthralgias and joint swelling (decreased). Negative for gait problem.   Skin:  Negative for color change and rash.   Neurological:  Negative for weakness.   Psychiatric/Behavioral:  Negative for confusion.      I have reviewed the medical and surgical history, family history, social history, medications, and/or allergies, and the review of systems of this report.    Objective   Temp 97.7 °F (36.5 °C)   Ht 165.1 cm (65\")   Wt 63.3 kg (139 lb 9.6 oz)   LMP  (LMP Unknown)   BMI 23.23 kg/m²       Signs of infection: [x] no                    [] yes   Drainage: [x] no                    [] yes   Incision: [] healing well     [x]healed well   Motor exam intact: [] no                    [x] yes   Neurovascular exam intact: [] no                    [x] yes   Signs of compartment syndrome: [x] no                    [] yes   Signs of DVT: [x] no                    [] yes   Other: Left knee AROM 0 - 116 degrees.  Left knee 0 -124 degrees.  MMT 5/5 quad and hamstrings.  Ligament exam stable.  Soft diffuse left knee swelling.  Skin normal.  No calf pain.  Castillo sign negative.  Slight residual limp and reviewed gait training techniques.     Physical Exam  Ortho Exam    Neurologic Exam    Assessment & Plan     Independent Review of Radiographic Studies:    No new imaging done today.    Laboratory and Other Studies:  No new results reviewed today.     Medical Decision Making:    No complications of procedure " and treatments.  Stable neurovascular exam.  Stable post-operative exam and expected early progress.  Excellent progress after right total knee replacement 2017.  Routine activity and exercise.  Good progress, improved after recent left total knee replacement.  Continue current treatment plan.     Procedures     Diagnoses and all orders for this visit:    1. Status post total knee replacement using cement, left (Primary)    2. History of total knee replacement, right       Recommendations/Plan:     Physical therapy: []rehab facility  [x]outpatient referral  [x] therapy ongoing   Ultrasound: [x]not ordered         []order given to patient   Labs: [x]not ordered         []order given to patient   Weight Bearing status: [x]Full []PWB []NWB []Other     Discussion of orthopaedic goals and activities and patient expressed appreciation.  Regular exercise as tolerated  Guided on proper techniques for mobility and conditioning exercises  Weight bearing parameters reviewed  Take prescribed medications as instructed only as tolerated     Exercise, medications other patient advice, and return appointment as noted.  Brace: No brace was given at today's visit.  Referral: Physical and Occupational Therapy referral.  Test/Studies: No additional studies ordered at this time.  Work/Activity Status: Usual activities, routine exercise as tolerated, light physical work as tolerated, no strenuous activity.    Return in about 4 months (around 9/13/2024) for Recheck.  Patient is encouraged and agreeable to call or return sooner for any issues or concerns.

## 2024-05-14 ENCOUNTER — TREATMENT (OUTPATIENT)
Dept: PHYSICAL THERAPY | Facility: CLINIC | Age: 74
End: 2024-05-14
Payer: MEDICARE

## 2024-05-14 DIAGNOSIS — Z96.652 TOTAL KNEE REPLACEMENT STATUS, LEFT: Primary | ICD-10-CM

## 2024-05-14 NOTE — PROGRESS NOTES
Physical Therapy Daily Treatment Note  [address] [place] [address]      Patient: Sahara Hardwick   : 1950  Referring practitioner: John Dewitt MD  Date of Initial Visit: Type: THERAPY  Noted: 2024  Today's Date: 2024  Patient seen for 2 sessions         Visit Diagnoses:    ICD-10-CM ICD-9-CM   1. Total knee replacement status, left  Z96.652 V43.65       Subjective   Patient reports she saw the MD who wanted her to try to work on the swelling in her leg and to work on her bending.     Objective   See Exercise, Manual, and Modality Logs for complete treatment.   L knee ROM: 0-120 deg  Gait: pt still hip flexes to clear her L LE  Gait training: pt cued for heel strike, toe off, decreased hip flexion on swing and equal WBing    Assessment/Plan  Pt without report of significant increase in pain even with increase in therex today.  Educated patient regarding self mobilization techniques that she can do for working on edema.  Also discussed with patient gait mechanics and not using hip flexion to clear her left foot.  Patient has some difficulty with carryover.  Continue with current POT progressing patient per tolerance.    Timed:         Manual Therapy: 11        mins  92560;     Therapeutic Exercise:     24    mins  33659;     Neuromuscular Valdez:      mins  73910;    Therapeutic Activity:          mins  17759;     Gait Trainin      mins  86635;     Ultrasound:          mins  10324;    Ionto                                   mins   58718  Self Care                            mins   20255  Canalith Repos         mins 09339      Un-Timed:  Electrical Stimulation:        mins  89327 ( );  Dry Needling          mins self-pay  Traction          mins [id number]      Timed Treatment:   40   mins   Total Treatment:     50   mins    Wandy Mejia, PT  [place] License: [id number]

## 2024-05-15 LAB
ALBUMIN SERPL-MCNC: 4.3 G/DL (ref 3.5–5.2)
ALBUMIN/GLOB SERPL: 2.2 G/DL
ALP SERPL-CCNC: 66 U/L (ref 39–117)
ALT SERPL-CCNC: 8 U/L (ref 1–33)
AST SERPL-CCNC: 15 U/L (ref 1–32)
BASOPHILS # BLD AUTO: 0.04 10*3/MM3 (ref 0–0.2)
BASOPHILS NFR BLD AUTO: 0.6 % (ref 0–1.5)
BILIRUB SERPL-MCNC: 0.4 MG/DL (ref 0–1.2)
BUN SERPL-MCNC: 13 MG/DL (ref 8–23)
BUN/CREAT SERPL: 19.7 (ref 7–25)
CALCIUM SERPL-MCNC: 9.6 MG/DL (ref 8.6–10.5)
CHLORIDE SERPL-SCNC: 104 MMOL/L (ref 98–107)
CHOLEST SERPL-MCNC: 159 MG/DL (ref 0–200)
CO2 SERPL-SCNC: 26.4 MMOL/L (ref 22–29)
CREAT SERPL-MCNC: 0.66 MG/DL (ref 0.57–1)
EGFRCR SERPLBLD CKD-EPI 2021: 93.3 ML/MIN/1.73
EOSINOPHIL # BLD AUTO: 0.13 10*3/MM3 (ref 0–0.4)
EOSINOPHIL NFR BLD AUTO: 1.9 % (ref 0.3–6.2)
ERYTHROCYTE [DISTWIDTH] IN BLOOD BY AUTOMATED COUNT: 13.4 % (ref 12.3–15.4)
GLOBULIN SER CALC-MCNC: 2 GM/DL
GLUCOSE SERPL-MCNC: 94 MG/DL (ref 65–99)
HBA1C MFR BLD: 5.2 % (ref 4.8–5.6)
HCT VFR BLD AUTO: 42.9 % (ref 34–46.6)
HDLC SERPL-MCNC: 52 MG/DL (ref 40–60)
HGB BLD-MCNC: 13.3 G/DL (ref 12–15.9)
IMM GRANULOCYTES # BLD AUTO: 0.01 10*3/MM3 (ref 0–0.05)
IMM GRANULOCYTES NFR BLD AUTO: 0.1 % (ref 0–0.5)
LDLC SERPL CALC-MCNC: 77 MG/DL (ref 0–100)
LYMPHOCYTES # BLD AUTO: 2.8 10*3/MM3 (ref 0.7–3.1)
LYMPHOCYTES NFR BLD AUTO: 41.2 % (ref 19.6–45.3)
MCH RBC QN AUTO: 27.9 PG (ref 26.6–33)
MCHC RBC AUTO-ENTMCNC: 31 G/DL (ref 31.5–35.7)
MCV RBC AUTO: 90.1 FL (ref 79–97)
MONOCYTES # BLD AUTO: 0.6 10*3/MM3 (ref 0.1–0.9)
MONOCYTES NFR BLD AUTO: 8.8 % (ref 5–12)
NEUTROPHILS # BLD AUTO: 3.21 10*3/MM3 (ref 1.7–7)
NEUTROPHILS NFR BLD AUTO: 47.4 % (ref 42.7–76)
NRBC BLD AUTO-RTO: 0 /100 WBC (ref 0–0.2)
PLATELET # BLD AUTO: 380 10*3/MM3 (ref 140–450)
POTASSIUM SERPL-SCNC: 4.4 MMOL/L (ref 3.5–5.2)
PROT SERPL-MCNC: 6.3 G/DL (ref 6–8.5)
RBC # BLD AUTO: 4.76 10*6/MM3 (ref 3.77–5.28)
SODIUM SERPL-SCNC: 144 MMOL/L (ref 136–145)
TRIGL SERPL-MCNC: 178 MG/DL (ref 0–150)
VLDLC SERPL CALC-MCNC: 30 MG/DL (ref 5–40)
WBC # BLD AUTO: 6.79 10*3/MM3 (ref 3.4–10.8)

## 2024-05-17 ENCOUNTER — TREATMENT (OUTPATIENT)
Dept: PHYSICAL THERAPY | Facility: CLINIC | Age: 74
End: 2024-05-17
Payer: MEDICARE

## 2024-05-17 DIAGNOSIS — Z96.652 TOTAL KNEE REPLACEMENT STATUS, LEFT: Primary | ICD-10-CM

## 2024-05-19 NOTE — PROGRESS NOTES
Physical Therapy Daily Treatment Note  [address] [place] [address]      Patient: Sahara Hardwick   : 1950  Referring practitioner: John Dewitt MD  Date of Initial Visit: Type: THERAPY  Noted: 2024  Today's Date: 2024  Patient seen for 3 sessions         Visit Diagnoses:    ICD-10-CM ICD-9-CM   1. Total knee replacement status, left  Z96.652 V43.65       Subjective   Patient reports still has the swelling around her knee. Notes slightly sore after exercise..    Objective   See Exercise, Manual, and Modality Logs for complete treatment.   Gait: pt continues to use increased hip flexion to clear the foot and does not perform heel strike  Gait training: cuing for decreased hip flexion and increased heel strike    Assessment/Plan  Pt with minimal reports of pain during treatment mostly with end range motions. Pt still has difficulty with carryover which may make improvements in gait difficult. Educated pt to continue to work with her HEP to improve her quad strength which may aid with what she perceives to be swelling. Continue with current POT progressing pt per tolerance.     Timed:         Manual Therapy: 12        mins  09730;     Therapeutic Exercise:     29    mins  93652;     Neuromuscular Valdez:      mins  67564;    Therapeutic Activity:          mins  91857;     Gait Training:           mins  72181;     Ultrasound:          mins  85398;    Ionto                                   mins   47746  Self Care                            mins   59210  Canalith Repos         mins 37741      Un-Timed:  Electrical Stimulation:        mins  50609 ( );  Dry Needling          mins self-pay  Traction          mins 20195      Timed Treatment:   41   mins   Total Treatment:     43   mins    Wandy Mejia PT  [place] License: [id number]

## 2024-05-20 ENCOUNTER — OFFICE VISIT (OUTPATIENT)
Dept: INTERNAL MEDICINE | Facility: CLINIC | Age: 74
End: 2024-05-20
Payer: MEDICARE

## 2024-05-20 VITALS
TEMPERATURE: 97.5 F | HEIGHT: 65 IN | BODY MASS INDEX: 22.99 KG/M2 | WEIGHT: 138 LBS | SYSTOLIC BLOOD PRESSURE: 132 MMHG | OXYGEN SATURATION: 99 % | RESPIRATION RATE: 18 BRPM | DIASTOLIC BLOOD PRESSURE: 81 MMHG | HEART RATE: 76 BPM

## 2024-05-20 DIAGNOSIS — E78.2 MIXED HYPERLIPIDEMIA: ICD-10-CM

## 2024-05-20 DIAGNOSIS — Z00.00 MEDICARE ANNUAL WELLNESS VISIT, SUBSEQUENT: Primary | ICD-10-CM

## 2024-05-20 DIAGNOSIS — Z23 NEED FOR PNEUMOCOCCAL VACCINATION: ICD-10-CM

## 2024-05-20 DIAGNOSIS — I10 BENIGN ESSENTIAL HYPERTENSION: ICD-10-CM

## 2024-05-20 NOTE — PATIENT INSTRUCTIONS
Medicare Wellness  Personal Prevention Plan of Service     Date of Office Visit:    Encounter Provider:  Shona Horn MD  Place of Service:  [facility]  Patient Name: Sahara Hardwick  :  1950    As part of the Medicare Wellness portion of your visit today, we are providing you with this personalized preventive plan of services (PPPS). This plan is based upon recommendations of the [place] Preventive Services Task Force (USPSTF) and the Advisory Committee on Immunization Practices (ACIP).    This lists the preventive care services that should be considered, and provides dates of when you are due. Items listed as completed are up-to-date and do not require any further intervention.    Health Maintenance   Topic Date Due   • HEPATITIS C SCREENING  Never done   • COVID-19 Vaccine (2023- season) 2024 (Originally 2023)   • INFLUENZA VACCINE  2024   • TDAP/TD VACCINES (3 - Td or Tdap) 2025   • LIPID PANEL  05/15/2025   • DXA SCAN  05/15/2025   • ANNUAL WELLNESS VISIT  2025   • MAMMOGRAM  2025   • GASTROSCOPY (EGD)  2027   • COLORECTAL CANCER SCREENING  2029   • RSV Vaccine - Adults  Completed   • Pneumococcal Vaccine 65+  Completed   • ZOSTER VACCINE  Completed       No orders of the defined types were placed in this encounter.      Return in about 1 year (around 2025) for Medicare Wellness.

## 2024-05-20 NOTE — PROGRESS NOTES
The ABCs of the Annual Wellness Visit  Subsequent Medicare Wellness Visit    Chief Complaint   Patient presents with    Medicare Wellness-subsequent    Hypertension    Hyperlipidemia       Subjective      Sahara Hardwick is a 73 y.o. female who presents for a Subsequent Medicare Wellness Visit.  Patient is here to follow up on the blood pressure  The patient is taking the blood pressure medications as prescribed and has had no side effects. The patient is also here to follow up on the cholesterol and  had lab work done .  The patient also needs prevnar  20.  She recently had left TKR  Hyperlipidemia   Pertinent negatives include no chest pain or shortness of breath.   Hypertension   Pertinent negatives include no chest pain, palpitations or shortness of breath.  Answers submitted by the patient for this visit:  Primary Reason for Visit (Submitted on 5/13/2024)  What is the primary reason for your visit?: High Blood Pressure  High Blood Pressure Questionnaire (Submitted on 5/13/2024)  Chief Complaint: Hypertension  Chronicity: chronic  Onset: more than 1 year ago  Progression since onset: improved  Condition status: controlled  anxiety: No  blurred vision: No  chest pain: No  headaches: No  malaise/fatigue: No  orthopnea: No  palpitations: No  peripheral edema: No  shortness of breath: No  CAD risks: dyslipidemia  Compliance problems: no compliance problems      The following portions of the patient's history were reviewed and   updated as appropriate: allergies, current medications, past family history, past medical history, past social history, past surgical history, and problem list.    Compared to one year ago, the patient feels her physical   health is the same.    Compared to one year ago, the patient feels her mental   health is the same.    Recent Hospitalizations:  She was not admitted to the hospital during the last year.       Current Medical Providers:  Patient Care Team:  Shona Horn MD as PCP -  General (Internal Medicine)  Bam Roy APRN as Nurse Practitioner (Gastroenterology)  John Dewitt MD as Surgeon (Orthopedic Surgery)  German Garcia MD as Consulting Physician (Ophthalmology)  Markus Fall PA-C as Physician Assistant (Orthopedic Surgery)    Outpatient Medications Prior to Visit   Medication Sig Dispense Refill    Cholecalciferol (Vitamin D3) 50 MCG (2000 UT) tablet Take 1 tablet by mouth Daily.      Cimetidine (TAGAMET PO) Take  by mouth Daily As Needed.      lisinopril-hydrochlorothiazide (PRINZIDE,ZESTORETIC) 20-12.5 MG per tablet Take 0.5 tablets by mouth Daily. (Patient taking differently: Take 0.5 tablets by mouth Every Afternoon.) 45 tablet 2    Multiple Vitamins-Minerals (MULTIVITAMIN ADULT PO) Take 1 tablet by mouth Daily.      rosuvastatin (Crestor) 10 MG tablet Take 1 tablet by mouth Every Night. 90 tablet 2    tacrolimus (PROGRAF) 1 MG capsule Apply 1 capsule to the mouth or throat Take As Directed. Empty contents of 1 capsule into 1 L distilled water; Once dissolved, swish and spit up to 4 times a day.       No facility-administered medications prior to visit.       No opioid medication identified on active medication list. I have reviewed chart for other potential  high risk medication/s and harmful drug interactions in the elderly.        Aspirin is not on active medication list.  Aspirin use is not indicated based on review of current medical condition/s. Risk of harm outweighs potential benefits.  .    Patient Active Problem List   Diagnosis    Primary osteoarthritis of right knee    Benign essential hypertension    Mixed hyperlipidemia    Celiac disease    Personal history of colonic polyps    Left lower quadrant abdominal pain    Change in bowel habits    Gastroesophageal reflux disease without esophagitis    Arthralgia of left knee    Primary osteoarthritis of left knee    Status post total knee replacement using cement, left     Advance Care  "Planning   Advance Care Planning     Advance Directive is on file.  ACP discussion was held with the patient during this visit. Patient has an advance directive in EMR which is still valid.      Objective    Vitals:    05/20/24 1316   BP: 132/81   Pulse: 76   Resp: 18   Temp: 97.5 °F (36.4 °C)   SpO2: 99%   Weight: 62.6 kg (138 lb)   Height: 165.1 cm (65\")   PainSc: 0-No pain     Estimated body mass index is 22.96 kg/m² as calculated from the following:    Height as of this encounter: 165.1 cm (65\").    Weight as of this encounter: 62.6 kg (138 lb).    BMI is within normal parameters. No other follow-up for BMI required.    All vitals recorded within this visit are reported by the patient.  Physical Examination  Vitals and nursing note reviewed  General appearance: Normocephalic and nontraumatic  HEENT: External inspection of eyes, ears and nose appears benign, hearing appears intact  Neck: Neck appears supple, trachea in midline, thyroid appears not enlarged  Respiratory: Respiratory effort appears normal  Musculoskeletal: Moving all limbs  Range of motion of visible joints appears within normal  CNS: No gross motor or sensory deficits  No gross cranial nerve deficits  No tremors  Psychiatry: Alert and oriented x3  Memory appears intact  Mood and affect appears normal  Insight appears normal  Skin : knee left incision clean and dry     Does the patient have evidence of cognitive impairment?   No    Lab Results   Component Value Date    CHLPL 159 05/15/2024    TRIG 178 (H) 05/15/2024    HDL 52 05/15/2024    LDL 77 05/15/2024    VLDL 30 05/15/2024    HGBA1C 5.20 05/15/2024    HGBA1C 5.80 (H) 02/23/2024          HEALTH RISK ASSESSMENT    Smoking Status:  Social History     Tobacco Use   Smoking Status Never    Passive exposure: Never   Smokeless Tobacco Never     Alcohol Consumption:  Social History     Substance and Sexual Activity   Alcohol Use Never     Fall Risk Screen:    DAVEY Fall Risk Assessment was " completed, and patient is at LOW risk for falls.Assessment completed on:2024    Depression Screenin/20/2024     1:22 PM   PHQ-2/PHQ-9 Depression Screening   Little Interest or Pleasure in Doing Things 0-->not at all   Feeling Down, Depressed or Hopeless 0-->not at all   PHQ-9: Brief Depression Severity Measure Score 0       Health Habits and Functional and Cognitive Screenin/20/2024     1:00 PM   Functional & Cognitive Status   Do you have difficulty preparing food and eating? No   Do you have difficulty bathing yourself, getting dressed or grooming yourself? No   Do you have difficulty using the toilet? No   Do you have difficulty moving around from place to place? No   Do you have trouble with steps or getting out of a bed or a chair? No   Current Diet Well Balanced Diet   Dental Exam Up to date   Eye Exam Up to date   Exercise (times per week) 5 times per week        Exercise Frequency Comment PT 2 times a week   Do you need help using the phone?  No   Are you deaf or do you have serious difficulty hearing?  No   Do you need help to go to places out of walking distance? No   Do you need help shopping? No   Do you need help preparing meals?  No   Do you need help with housework?  No   Do you need help with laundry? No   Do you need help taking your medications? No   Do you need help managing money? No   Do you ever drive or ride in a car without wearing a seat belt? No   Have you felt unusual stress, anger or loneliness in the last month? No   Who do you live with? Spouse   If you need help, do you have trouble finding someone available to you? No   Have you been bothered in the last four weeks by sexual problems? No   Do you have difficulty concentrating, remembering or making decisions? No       Age-appropriate Screening Schedule:  Refer to the list below for future screening recommendations based on patient's age, sex and/or medical conditions. Orders for these recommended tests are  listed in the plan section. The patient has been provided with a written plan.    Health Maintenance   Topic Date Due    HEPATITIS C SCREENING  Never done    COVID-19 Vaccine (7 - 2023-24 season) 08/09/2024 (Originally 11/16/2023)    INFLUENZA VACCINE  08/01/2024    TDAP/TD VACCINES (3 - Td or Tdap) 01/23/2025    LIPID PANEL  05/15/2025    DXA SCAN  05/15/2025    ANNUAL WELLNESS VISIT  05/20/2025    MAMMOGRAM  11/08/2025    GASTROSCOPY (EGD)  01/03/2027    COLORECTAL CANCER SCREENING  01/03/2029    RSV Vaccine - Adults  Completed    Pneumococcal Vaccine 65+  Completed    ZOSTER VACCINE  Completed                  CMS Preventative Services Quick Reference  Risk Factors Identified During Encounter:    Immunizations Discussed/Encouraged: Prevnar 20 (Pneumococcal 20-valent conjugate)    The above risks/problems have been discussed with the patient.  Pertinent information has been shared with the patient in the After Visit Summary.    Diagnoses and all orders for this visit:    1. Medicare annual wellness visit, subsequent (Primary)    2. Benign essential hypertension    3. Mixed hyperlipidemia  -     CBC (No Diff)  -     Comprehensive Metabolic Panel  -     Lipid Panel    4. Need for pneumococcal vaccination  -     Pneumococcal Conjugate Vaccine 20-Valent (PCV20)      Plan:  1.physical: Physical exam conducted today, reviewed fasting lab work,   Impression: healthy adult Patient. Currently, patient eats a healthy diet. Screening lab work includes glucose, lipid profile and complete blood count . The risks and benefits of immunizations were discussed and immunizations are up to date. Advice and education were given regarding nutrition and aerobic exercise. Patient discussion: discussed with the patient.  Annual Wellness Visit  with preventive exam as well as age and risk appropriate counseling completed.   Advance Directive Planning: paperwork and instructions were given to the patient.   Patient Discussion: plan  discussed with the patient, follow-up visit needed in one year. Medication Review and medication list updated  2.  Benign essential hypertension: Will continue current medication, low-sodium diet advised, Counseled to regularly check BP at home with goal averaging <130/80.   3.mixed hyperlipidemia:  reviewed  fasting CMP and lipid panel.  Diet and exercise counseled,  Will continue current medications  4. Need for prevar 20 : given today and well tolerated     Follow Up:   Next Medicare Wellness visit to be scheduled in 1 year.      An After Visit Summary and PPPS were made available to the patient.

## 2024-05-21 ENCOUNTER — TREATMENT (OUTPATIENT)
Dept: PHYSICAL THERAPY | Facility: CLINIC | Age: 74
End: 2024-05-21
Payer: MEDICARE

## 2024-05-21 DIAGNOSIS — Z96.652 TOTAL KNEE REPLACEMENT STATUS, LEFT: Primary | ICD-10-CM

## 2024-05-21 PROCEDURE — 97110 THERAPEUTIC EXERCISES: CPT

## 2024-05-21 PROCEDURE — 97140 MANUAL THERAPY 1/> REGIONS: CPT

## 2024-05-21 NOTE — PROGRESS NOTES
Physical Therapy Daily Treatment Note  [address] [place] [address]      Patient: Sahara Hardwick   : 1950  Referring practitioner: John Dewitt MD  Date of Initial Visit: Type: THERAPY  Noted: 2024  Today's Date: 2024  Patient seen for 4 sessions         Visit Diagnoses:    ICD-10-CM ICD-9-CM   1. Total knee replacement status, left  Z96.652 V43.65       Subjective   Patient reports slightly improved swelling. Notes less difficulty putting on pants today (pant  leg). Reports no specific issues. Does want to get back to walking longer distances.       Objective   See Exercise, Manual, and Modality Logs for complete treatment.   0-125 deg    Assessment/Plan  Pt without report of increased pain but does still have some difficulty with transitioning positions from straight to bent and bent to straight. Educated pt to start slowly increasing her walking distance/time to aid with return to walking for fitness. Decrease frequency to 1X a week due to pt's good progress. Discussed with pt to see what she is still having problems with so it can be discussed at next appt. Continue with current POT otherwise progressing pt per tolerance      Timed:         Manual Therapy: 9        mins  48288;     Therapeutic Exercise:     32    mins  11581;     Neuromuscular Valdez:      mins  50967;    Therapeutic Activity:          mins  71374;     Gait Training:           mins  56785;     Ultrasound:          mins  11909;    Ionto                                   mins   92926  Self Care                            mins   90492  Canalith Repos         mins 63819      Un-Timed:  Electrical Stimulation:        mins  62873 (MC );  Dry Needling          mins self-pay  Traction          mins 32432      Timed Treatment:   41   mins   Total Treatment:     41   mins    Wandy Mejia, PT  [place] License: [id number]

## 2024-05-28 ENCOUNTER — TREATMENT (OUTPATIENT)
Dept: PHYSICAL THERAPY | Facility: CLINIC | Age: 74
End: 2024-05-28
Payer: MEDICARE

## 2024-05-28 DIAGNOSIS — Z96.652 TOTAL KNEE REPLACEMENT STATUS, LEFT: Primary | ICD-10-CM

## 2024-05-28 PROCEDURE — 97110 THERAPEUTIC EXERCISES: CPT

## 2024-05-28 PROCEDURE — 97530 THERAPEUTIC ACTIVITIES: CPT

## 2024-05-28 NOTE — PROGRESS NOTES
Physical Therapy Daily Treatment Note  [address] [place] [address]      Patient: Sahara Hardwick   : 1950  Referring practitioner: John Dewitt MD  Date of Initial Visit: Type: THERAPY  Noted: 2024  Today's Date: 2024  Patient seen for 5 sessions         Visit Diagnoses:    ICD-10-CM ICD-9-CM   1. Total knee replacement status, left  Z96.652 V43.65       Subjective   Patient reports that the knee is doing well. Still just stiff and has some swelling but feels it is continuing to improve. Notes nothing that she is not being able to do. Tried her stairs out front without a hand rail and did ok. Pt reports she has not been walking but not due to the knee more due to traffic and heat but feels she could walk 20 minutes without issues.    Objective   See Exercise, Manual, and Modality Logs for complete treatment.   L LE MMT: hip flexion 4/5, knee ext 5/5, knee flexion 4+/5   L knee ROM: 0-125 deg cuing for full range    Assessment/Plan  Pt has met STG#1,2,3 and LTG#1,2,3,4. Remaining goals are regarding pt walking for increasing distances which she is not doing but not because of the knee. Educated pt regarding working on returning to walking as this will aid with swelling and stiffness. Place pt's chart on hold for 30 days to allow pt to assess efficacy of HEP. If pt does not call for an appt DC chart at that time.    Timed:         Manual Therapy:         mins  43013;     Therapeutic Exercise:     32    mins  52448;     Neuromuscular Valdez:      mins  02826;    Therapeutic Activity:   9       mins  50195;     Gait Training:           mins  91805;     Ultrasound:          mins  28746;    Ionto                                   mins   98807  Self Care                            mins   45661  Canalith Repos         mins 57549      Un-Timed:  Electrical Stimulation:        mins  18262 ( );  Dry Needling          mins self-pay  Traction          mins 25958      Timed  Treatment:   41   mins   Total Treatment:     41   mins    Wandy Mejia, PT  [place] License: [id number]

## 2024-09-04 LAB
ALBUMIN SERPL-MCNC: 4.5 G/DL (ref 3.5–5.2)
ALBUMIN/GLOB SERPL: 2.1 G/DL
ALP SERPL-CCNC: 69 U/L (ref 39–117)
ALT SERPL-CCNC: 17 U/L (ref 1–33)
AST SERPL-CCNC: 19 U/L (ref 1–32)
BILIRUB SERPL-MCNC: 0.5 MG/DL (ref 0–1.2)
BUN SERPL-MCNC: 14 MG/DL (ref 8–23)
BUN/CREAT SERPL: 21.2 (ref 7–25)
CALCIUM SERPL-MCNC: 10 MG/DL (ref 8.6–10.5)
CHLORIDE SERPL-SCNC: 104 MMOL/L (ref 98–107)
CHOLEST SERPL-MCNC: 169 MG/DL (ref 0–200)
CO2 SERPL-SCNC: 28.1 MMOL/L (ref 22–29)
CREAT SERPL-MCNC: 0.66 MG/DL (ref 0.57–1)
EGFRCR SERPLBLD CKD-EPI 2021: 93.3 ML/MIN/1.73
ERYTHROCYTE [DISTWIDTH] IN BLOOD BY AUTOMATED COUNT: 14.4 % (ref 12.3–15.4)
GLOBULIN SER CALC-MCNC: 2.1 GM/DL
GLUCOSE SERPL-MCNC: 104 MG/DL (ref 65–99)
HCT VFR BLD AUTO: 43.6 % (ref 34–46.6)
HDLC SERPL-MCNC: 51 MG/DL (ref 40–60)
HGB BLD-MCNC: 13.6 G/DL (ref 12–15.9)
LDLC SERPL CALC-MCNC: 86 MG/DL (ref 0–100)
MCH RBC QN AUTO: 27.7 PG (ref 26.6–33)
MCHC RBC AUTO-ENTMCNC: 31.2 G/DL (ref 31.5–35.7)
MCV RBC AUTO: 88.8 FL (ref 79–97)
PLATELET # BLD AUTO: 403 10*3/MM3 (ref 140–450)
POTASSIUM SERPL-SCNC: 5 MMOL/L (ref 3.5–5.2)
PROT SERPL-MCNC: 6.6 G/DL (ref 6–8.5)
RBC # BLD AUTO: 4.91 10*6/MM3 (ref 3.77–5.28)
SODIUM SERPL-SCNC: 144 MMOL/L (ref 136–145)
TRIGL SERPL-MCNC: 191 MG/DL (ref 0–150)
VLDLC SERPL CALC-MCNC: 32 MG/DL (ref 5–40)
WBC # BLD AUTO: 6.76 10*3/MM3 (ref 3.4–10.8)

## 2024-09-09 ENCOUNTER — OFFICE VISIT (OUTPATIENT)
Dept: ORTHOPEDIC SURGERY | Facility: CLINIC | Age: 74
End: 2024-09-09
Payer: MEDICARE

## 2024-09-09 VITALS
HEIGHT: 65 IN | BODY MASS INDEX: 23.16 KG/M2 | WEIGHT: 139 LBS | SYSTOLIC BLOOD PRESSURE: 130 MMHG | DIASTOLIC BLOOD PRESSURE: 84 MMHG

## 2024-09-09 DIAGNOSIS — Z96.651 HISTORY OF TOTAL KNEE REPLACEMENT, RIGHT: ICD-10-CM

## 2024-09-09 DIAGNOSIS — M25.462 SWELLING OF JOINT OF LEFT KNEE: ICD-10-CM

## 2024-09-09 DIAGNOSIS — Z96.652 STATUS POST TOTAL KNEE REPLACEMENT USING CEMENT, LEFT: Primary | ICD-10-CM

## 2024-09-09 PROCEDURE — 1160F RVW MEDS BY RX/DR IN RCRD: CPT | Performed by: ORTHOPAEDIC SURGERY

## 2024-09-09 PROCEDURE — 3079F DIAST BP 80-89 MM HG: CPT | Performed by: ORTHOPAEDIC SURGERY

## 2024-09-09 PROCEDURE — 1159F MED LIST DOCD IN RCRD: CPT | Performed by: ORTHOPAEDIC SURGERY

## 2024-09-09 PROCEDURE — 3075F SYST BP GE 130 - 139MM HG: CPT | Performed by: ORTHOPAEDIC SURGERY

## 2024-09-09 PROCEDURE — 99213 OFFICE O/P EST LOW 20 MIN: CPT | Performed by: ORTHOPAEDIC SURGERY

## 2024-09-09 NOTE — PROGRESS NOTES
Subjective   Patient ID: Sahara Hardwick is a 73 y.o. right hand dominant female is here today for orthopaedic evaluation of recovery progress with treatment.  Follow-up of the Left Knee       CHIEF COMPLAINT:    Progress and recovery 6 months after left total knee replacement surgery.    History of Present Illness     Progress Note:  Patient reports that with treatments and since the last visit, overall left knee pain is decreased,  mobility is full, strength is good. Does report occasional residual swelling, and she can have a small lateral knee click in the morning that resolves after first steps.  Patient has a prior right total knee replacement seven years ago with continued excellent recovery.  The patient is not currently taking pain medication.   Formal physical therapy has been helpful.  Since last visit, patient has been tolerating routine activities, ambulating well, retired and remains active, and doing routine home exercises.    Past Medical History:   Diagnosis Date    Adenomatous colon polyp 01/03/2024    Allergic 1995    seasonal    Arthritis     Celiac disease     Colon polyp     Diverticulitis of colon 10 yrs ago    Diverticulosis 2014    Elevated cholesterol     GERD (gastroesophageal reflux disease)     Hard of hearing     Hip arthrosis     left hip only    Hypertension     Kidney stone 1980    Knee swelling  ,    left    Left knee arthritis/surgery 3/5/24    Osteopenia 2018    osteopenia    SCC (squamous cell carcinoma), leg, right     excision- derm 2021    Sciatica 04/2019    Wears glasses         Past Surgical History:   Procedure Laterality Date    APPENDECTOMY  1992    CHOLECYSTECTOMY  1992    COLONOSCOPY  2009    COLONOSCOPY N/A 01/03/2024    Procedure: COLONOSCOPY with biopsy and polypectomy;  Surgeon: Nakia Smallwood MD;  Location[facility] ENDOSCOPY;  Service: Gastroenterology;  Laterality: N/A;    COLONOSCOPY W/ POLYPECTOMY      2019 dr micheal corbett     ENDOSCOPY N/A 2024    Procedure: ESOPHAGOGASTRODUODENOSCOPY with biopsy;  Surgeon: Nakia Smallwood MD;  Location: [facility] ENDOSCOPY;  Service: Gastroenterology;  Laterality: N/A;    HAND SURGERY      right middle finger arthritis nodule removed.    PA ARTHRP KNE CONDYLE&PLATU MEDIAL&LAT COMPARTMENTS Right 2017    Procedure: Elective right total knee replacement (BIOMET);  Surgeon: John Dewitt MD;  Location: [facility] OR;  Service: Orthopedics    TOTAL ABDOMINAL HYSTERECTOMY WITH SALPINGO OOPHORECTOMY  1991    TOTAL KNEE ARTHROPLASTY Left 2024    Procedure: Elective left total knee arthroplasty;  Surgeon: John Dewitt MD;  Location: [facility] OR;  Service: Orthopedics;  Laterality: Left;        Family History   Problem Relation Age of Onset    Osteoarthritis Mother     Heart block Mother     Cancer Mother         breast/     Arthritis Mother     Thyroid disease Mother     Osteoporosis Mother     Colon cancer Father         Diagnosed in his 60's    Heart block Father     Diabetes Father              Hyperlipidemia Father     Cancer Father         colon/     Heart disease Father     Coronary artery disease Other         Social History     Socioeconomic History    Marital status:    Tobacco Use    Smoking status: Never     Passive exposure: Never    Smokeless tobacco: Never   Vaping Use    Vaping status: Never Used   Substance and Sexual Activity    Alcohol use: No    Drug use: No    Sexual activity: Yes     Partners: Male     Birth control/protection: Hysterectomy     Comment:  for 51 yrs/ only       Allergies   Allergen Reactions    Gluten Meal GI Intolerance    Wheat Diarrhea    Latex Rash     Tape and adhesive bandages cause rash     Milk-Related Compounds GI Intolerance       Review of Systems   Constitutional:  Negative for fever.   Musculoskeletal:  Positive for arthralgias and joint swelling. Negative for gait problem.   Skin:   "Negative for color change and rash.   Neurological:  Negative for weakness.   Psychiatric/Behavioral:  Negative for confusion.        I have reviewed the medical and surgical history, family history, social history, medications, and/or allergies, and the review of systems of this report.    Objective   /84 (BP Location: Left arm, Patient Position: Sitting, Cuff Size: Adult)   Ht 165.1 cm (65\")   Wt 63 kg (139 lb)   BMI 23.13 kg/m²   Physical Exam  Musculoskeletal:      Right knee: No effusion.      Left knee: No effusion.       Right Knee Exam     Muscle Strength   The patient has normal right knee strength.    Tenderness   The patient is experiencing no tenderness.     Range of Motion   Extension:  0   Right knee flexion: 125 degrees.     Tests   Varus: negative Valgus: negative  Patellar apprehension: negative    Other   Erythema: absent  Scars: present (healed pristine)  Pulse: present  Swelling: none  Effusion: no effusion present      Left Knee Exam     Muscle Strength   The patient has normal left knee strength.    Tenderness   The patient is experiencing no tenderness (no knee click elicited on exam today.  Patient will monitor.).     Range of Motion   Extension:  0   Left knee flexion: 125 degrees.     Tests   Varus: negative Valgus: negative  Patellar apprehension: negative    Other   Erythema: absent  Scars: present (healed pristine)  Pulse: present  Swelling: mild  Effusion: no effusion present          Extremity DVT signs are negative on physical exam with negative Castillo sign, no calf pain, no palpable cords, and no skin tone change   Neurologic Exam    Assessment & Plan     Independent Review of Radiographic Studies:    No new imaging done today.    Laboratory and Other Studies:  Recent results were stable and reviewed with patient including normal WBC count.     Medical Decision Making:    No complications of procedure and treatments.  Stable neurovascular exam.  Fair progress with good " ambulation, function and no pain 6 months after left total knee replacement.  Symptoms of decreased residual knee swelling and occasional morning knee joint click sensation.  Stable exam and recent labs.  Continue care plan as noted.  Patient will return sooner for any increased swelling, persistent knee joint clicking or other concern.  Physical and occupational therapy program ongoing.    Procedures    Diagnoses and all orders for this visit:    1. Status post total knee replacement using cement, left (Primary)    2. Swelling of joint of left knee  Comments:  milder, improved    3. History of total knee replacement, right       Discussion of orthopaedic goals and activities and patient expressed appreciation.  Regular exercise as tolerated.  Guided on proper techniques for mobility and conditioning exercises.  Ice, heat, and/or modalities as beneficial.  Home exercise program and instructions provided to patient.  Home exercise program ongoing.  After care and dental prophylaxis for joint replacement prosthesis.    Recommendations/Plan:  Exercise, medications, injections, other patient advice, and return appointment as noted.  Brace: No brace was given at today's visit.  Referral: No referrals made at today's visit.  Test/Studies: No additional studies ordered at this time.  Work/Activity Status: Usual activities, routine exercise as tolerated, light physical work as tolerated, no strenuous activity.    Return in about 6 months (around 3/9/2025) for annual recheck XOA both knees.  Patient is encouraged and agreeable to call or return sooner for any issues or concerns.

## 2024-09-10 ENCOUNTER — OFFICE VISIT (OUTPATIENT)
Dept: INTERNAL MEDICINE | Facility: CLINIC | Age: 74
End: 2024-09-10
Payer: MEDICARE

## 2024-09-10 VITALS
DIASTOLIC BLOOD PRESSURE: 79 MMHG | TEMPERATURE: 97.1 F | RESPIRATION RATE: 20 BRPM | WEIGHT: 139 LBS | HEIGHT: 65 IN | HEART RATE: 67 BPM | BODY MASS INDEX: 23.16 KG/M2 | OXYGEN SATURATION: 100 % | SYSTOLIC BLOOD PRESSURE: 159 MMHG

## 2024-09-10 DIAGNOSIS — R73.01 IMPAIRED FASTING GLUCOSE: ICD-10-CM

## 2024-09-10 DIAGNOSIS — I10 BENIGN ESSENTIAL HYPERTENSION: Primary | ICD-10-CM

## 2024-09-10 DIAGNOSIS — E78.2 MIXED HYPERLIPIDEMIA: ICD-10-CM

## 2024-09-10 DIAGNOSIS — M25.562 PAIN IN JOINT OF LEFT KNEE: ICD-10-CM

## 2024-09-10 PROCEDURE — 3077F SYST BP >= 140 MM HG: CPT | Performed by: INTERNAL MEDICINE

## 2024-09-10 PROCEDURE — 1126F AMNT PAIN NOTED NONE PRSNT: CPT | Performed by: INTERNAL MEDICINE

## 2024-09-10 PROCEDURE — 3078F DIAST BP <80 MM HG: CPT | Performed by: INTERNAL MEDICINE

## 2024-09-10 PROCEDURE — 1160F RVW MEDS BY RX/DR IN RCRD: CPT | Performed by: INTERNAL MEDICINE

## 2024-09-10 PROCEDURE — G2211 COMPLEX E/M VISIT ADD ON: HCPCS | Performed by: INTERNAL MEDICINE

## 2024-09-10 PROCEDURE — 1159F MED LIST DOCD IN RCRD: CPT | Performed by: INTERNAL MEDICINE

## 2024-09-10 PROCEDURE — 99214 OFFICE O/P EST MOD 30 MIN: CPT | Performed by: INTERNAL MEDICINE

## 2024-09-10 RX ORDER — ROSUVASTATIN CALCIUM 10 MG/1
10 TABLET, COATED ORAL NIGHTLY
Qty: 90 TABLET | Refills: 2 | Status: SHIPPED | OUTPATIENT
Start: 2024-09-10 | End: 2024-09-10 | Stop reason: SDUPTHER

## 2024-09-10 RX ORDER — ROSUVASTATIN CALCIUM 10 MG/1
10 TABLET, COATED ORAL NIGHTLY
Qty: 90 TABLET | Refills: 3 | Status: SHIPPED | OUTPATIENT
Start: 2024-09-10

## 2024-09-10 RX ORDER — LISINOPRIL AND HYDROCHLOROTHIAZIDE 12.5; 2 MG/1; MG/1
0.5 TABLET ORAL DAILY
Qty: 45 TABLET | Refills: 3 | Status: SHIPPED | OUTPATIENT
Start: 2024-09-10

## 2024-09-10 RX ORDER — LISINOPRIL AND HYDROCHLOROTHIAZIDE 12.5; 2 MG/1; MG/1
0.5 TABLET ORAL DAILY
Qty: 45 TABLET | Refills: 2 | Status: SHIPPED | OUTPATIENT
Start: 2024-09-10 | End: 2024-09-10 | Stop reason: SDUPTHER

## 2024-09-10 NOTE — PROGRESS NOTES
"Chief Complaint  Hypertension (Follow up on lab results) and Hyperlipidemia    Subjective        Sahara Hardwick presents to [facility]  HPI: Patient is here to follow up on the blood pressure  The patient is taking the blood pressure medications as prescribed and has had no side effects. The patient is also here to follow up on the cholesterol and  had  lab work done .  The patient also needs refills on medications .  She complains of left knee joint pain and underwent surgery and saw orthopedist yesterday and is scheduled to get an x-ray  Hyperlipidemia   Pertinent negatives include no chest pain or shortness of breath.   Hypertension   Pertinent negatives include no chest pain, palpitations or shortness of breath.      Objective   Vital Signs:  /79   Pulse 67   Temp 97.1 °F (36.2 °C)   Resp 20   Ht 165.1 cm (65\")   Wt 63 kg (139 lb)   SpO2 100%   BMI 23.13 kg/m²   Estimated body mass index is 23.13 kg/m² as calculated from the following:    Height as of this encounter: 165.1 cm (65\").    Weight as of this encounter: 63 kg (139 lb).    BMI is within normal parameters. No other follow-up for BMI required.      Physical Exam  Vitals and nursing note reviewed.   Constitutional:       General: She is not in acute distress.     Appearance: Normal appearance. She is not diaphoretic.   HENT:      Head: Normocephalic and atraumatic.      Right Ear: External ear normal.      Left Ear: External ear normal.      Nose: Nose normal.   Eyes:      Extraocular Movements: Extraocular movements intact.      Conjunctiva/sclera: Conjunctivae normal.   Neck:      Trachea: Trachea normal.   Cardiovascular:      Rate and Rhythm: Normal rate and regular rhythm.      Heart sounds: Normal heart sounds.   Pulmonary:      Effort: Pulmonary effort is normal. No respiratory distress.      Breath sounds: Normal breath sounds.   Abdominal:      General: Abdomen is flat.   Musculoskeletal:         " General: Deformity present.      Cervical back: Neck supple.      Comments: Moves all limbs   Skin:     General: Skin is warm.   Neurological:      Mental Status: She is alert and oriented to person, place, and time.      Comments: No gross motor or sensory deficits        Result Review :  The following data was reviewed by: Shona Horn MD on 09/10/2024:  Common labs          2/23/2024    10:35 5/15/2024    07:57 9/4/2024    08:05   Common Labs   Glucose 97  94  104    BUN 11  13  14    Creatinine 0.60  0.66  0.66    Sodium 142  144  144    Potassium 4.2  4.4  5.0    Chloride 103  104  104    Calcium 9.8  9.6  10.0    Total Protein  6.3  6.6    Albumin 4.6  4.3  4.5    Total Bilirubin 0.3  0.4  0.5    Alkaline Phosphatase 68  66  69    AST (SGOT) 19  15  19    ALT (SGPT) 14  8  17    WBC 6.69  6.79  6.76    Hemoglobin 13.9  13.3  13.6    Hematocrit 42.9  42.9  43.6    Platelets 331  380  403    Total Cholesterol  159  169    Triglycerides  178  191    HDL Cholesterol  52  51    LDL Cholesterol   77  86    Hemoglobin A1C 5.80  5.20            Office Visit with John Dewitt MD (09/09/2024)      Assessment and Plan   Diagnoses and all orders for this visit:    1. Benign essential hypertension (Primary)  -     Discontinue: lisinopril-hydrochlorothiazide (PRINZIDE,ZESTORETIC) 20-12.5 MG per tablet; Take 0.5 tablets by mouth Daily.  Dispense: 45 tablet; Refill: 2  -     lisinopril-hydrochlorothiazide (PRINZIDE,ZESTORETIC) 20-12.5 MG per tablet; Take 0.5 tablets by mouth Daily.  Dispense: 45 tablet; Refill: 3    2. Mixed hyperlipidemia  -     Discontinue: rosuvastatin (Crestor) 10 MG tablet; Take 1 tablet by mouth Every Night.  Dispense: 90 tablet; Refill: 2  -     CBC (No Diff)  -     Comprehensive Metabolic Panel  -     Lipid Panel  -     rosuvastatin (Crestor) 10 MG tablet; Take 1 tablet by mouth Every Night.  Dispense: 90 tablet; Refill: 3    3. Impaired fasting glucose  -     Hemoglobin A1c    4. Pain in  joint of left knee    Plan:  1.  Benign essential hypertension: Will continue current medication, low-sodium diet advised, Counseled to regularly check BP at home with goal averaging <130/80.  She states her home blood pressure readings are within range  2.mixed hyperlipidemia:  reviewed  fasting CMP and lipid panel.  Diet and exercise counseled,  Will continue current medications  3. impaired glucose   :   reviewed  fasting CMP  and hba1c  , diet and exercise counseled  4.  Left knee joint pain: To continue per orthopedist           Follow Up   No follow-ups on file.  Patient was given instructions and counseling regarding her condition or for health maintenance advice. Please see specific information pulled into the AVS if appropriate.             Answers submitted by the patient for this visit:  Primary Reason for Visit (Submitted on 9/3/2024)  What is the primary reason for your visit?: High Blood Pressure

## 2024-12-06 DIAGNOSIS — Z96.652 STATUS POST TOTAL KNEE REPLACEMENT USING CEMENT, LEFT: Primary | ICD-10-CM

## 2024-12-06 RX ORDER — AMOXICILLIN 500 MG/1
CAPSULE ORAL
Qty: 4 CAPSULE | Refills: 0 | Status: SHIPPED | OUTPATIENT
Start: 2024-12-06

## 2025-03-11 ENCOUNTER — TELEPHONE (OUTPATIENT)
Dept: INTERNAL MEDICINE | Facility: CLINIC | Age: 75
End: 2025-03-11
Payer: MEDICARE

## 2025-03-11 NOTE — TELEPHONE ENCOUNTER
Patient has been notified of results and to continue monthly self breast exams and annual mammogram screenings.

## 2025-03-11 NOTE — TELEPHONE ENCOUNTER
----- Message from Shona Horn sent at 3/6/2025  2:20 PM EST -----  Annual mammogram  ----- Message -----  From: Vivian Hagen RegSched Rep  Sent: 3/6/2025   1:53 PM EST  To: Shona Horn MD

## 2025-03-13 DIAGNOSIS — Z96.651 HISTORY OF TOTAL KNEE REPLACEMENT, RIGHT: ICD-10-CM

## 2025-03-13 DIAGNOSIS — Z96.652 STATUS POST TOTAL KNEE REPLACEMENT USING CEMENT, LEFT: Primary | ICD-10-CM

## 2025-03-14 ENCOUNTER — OFFICE VISIT (OUTPATIENT)
Dept: ORTHOPEDIC SURGERY | Facility: CLINIC | Age: 75
End: 2025-03-14
Payer: MEDICARE

## 2025-03-14 VITALS
BODY MASS INDEX: 24.32 KG/M2 | SYSTOLIC BLOOD PRESSURE: 132 MMHG | WEIGHT: 146 LBS | DIASTOLIC BLOOD PRESSURE: 86 MMHG | HEIGHT: 65 IN

## 2025-03-14 DIAGNOSIS — Z96.651 HISTORY OF TOTAL KNEE REPLACEMENT, RIGHT: ICD-10-CM

## 2025-03-14 DIAGNOSIS — Z96.652 STATUS POST TOTAL KNEE REPLACEMENT USING CEMENT, LEFT: Primary | ICD-10-CM

## 2025-03-14 RX ORDER — AMOXICILLIN 500 MG/1
CAPSULE ORAL
Qty: 4 CAPSULE | Refills: 0 | Status: SHIPPED | OUTPATIENT
Start: 2025-03-14

## 2025-03-21 NOTE — PROGRESS NOTES
Subjective   Patient ID: Sahara Hardwick is a 74 y.o. right hand dominant female is here today for orthopaedic evaluation of recovery progress with treatment.  Follow-up of the Right Knee and Follow-up of the Left Knee       CHIEF COMPLAINT:    Annual visit one year after left total knee joint replacement surgery.  History of right total knee replacement 8 years ago    History of Present Illness     Progress Note:  Patient reports that with treatments and since the last visit, overall pain is resolved,  mobility is good, strength is full.  Reports both knees are doing well. She notes residual mild swelling of the left knee. She walks for exercise about a mile about 3 times a week and does routine home exercises  The patient is not currently taking pain medication.   Physical therapy has been effective. Since last visit, patient has been tolerating routine activities, ambulating well, retired and remains active, and doing routine home exercises    Past Medical History:   Diagnosis Date    Adenomatous colon polyp 01/03/2024    Allergic 1995    seasonal    Arthritis 01/2017    Rt Knee surgery 08/2017, Lt Knee surgery  03/2024    Celiac disease     Cholelithiasis     Colon polyp 05/16/19 ,            01/03/24 1 poly found    2 found with colonoscopy/ 2024 1 found    Deep vein thrombosis     Diverticulitis of colon 10 yrs ago    Diverticulosis 2014 2009    Eating disorder     Elevated cholesterol     GERD (gastroesophageal reflux disease)     Hard of hearing     Hip arthrosis     left hip only    History of medical problems Rt leg lesion, squamous cell carcinoma removed    9-    Hypertension     Irritable bowel syndrome     Kidney stone 1980    Knee swelling  ,    left    Left knee arthritis/surgery 3/5/24    Osteopenia 2018    osteopenia    SCC (squamous cell carcinoma), leg, right     excision- derm 2021    Sciatica 04/2019    Sinusitis 1995    seasonal w/allergies    Wears glasses          Past Surgical History:   Procedure Laterality Date    APPENDECTOMY  1992    CHOLECYSTECTOMY      COLONOSCOPY      COLONOSCOPY N/A 2024    Procedure: COLONOSCOPY with biopsy and polypectomy;  Surgeon: Nakia Smallwood MD;  Location: [facility] ENDOSCOPY;  Service: Gastroenterology;  Laterality: N/A;    COLONOSCOPY W/ POLYPECTOMY       dr micheal corbett    ENDOSCOPY N/A 2024    Procedure: ESOPHAGOGASTRODUODENOSCOPY with biopsy;  Surgeon: Nakia Smallwood MD;  Location: [facility] ENDOSCOPY;  Service: Gastroenterology;  Laterality: N/A;    HAND SURGERY      right middle finger arthritis nodule removed.    JOINT REPLACEMENT  2017    rt knee 18,left knee 3/5/24    LA ARTHRP KNE CONDYLE&PLATU MEDIAL&LAT COMPARTMENTS Right 2017    Procedure: Elective right total knee replacement (BIOMET);  Surgeon: John Dewitt MD;  Location: [facility] OR;  Service: Orthopedics    TOTAL ABDOMINAL HYSTERECTOMY WITH SALPINGO OOPHORECTOMY      TOTAL KNEE ARTHROPLASTY Left 2024    Procedure: Elective left total knee arthroplasty;  Surgeon: John Dewitt MD;  Location: [facility] OR;  Service: Orthopedics;  Laterality: Left;    UPPER GASTROINTESTINAL ENDOSCOPY  24        Family History   Problem Relation Age of Onset    Osteoarthritis Mother     Heart block Mother     Cancer Mother         breast/     Arthritis Mother     Thyroid disease Mother     Osteoporosis Mother     Colon cancer Father         Diagnosed in his 60's    Heart block Father     Diabetes Father              Hyperlipidemia Father     Cancer Father         colon/     Heart disease Father     Coronary artery disease Other         Social History     Socioeconomic History    Marital status:    Tobacco Use    Smoking status: Never     Passive exposure: Never    Smokeless tobacco: Never   Vaping Use    Vaping status: Never Used   Substance and Sexual Activity    Alcohol use: Never  "   Drug use: Never    Sexual activity: Yes     Partners: Male     Birth control/protection: Hysterectomy     Comment:  for 52 yrs/ only       Allergies   Allergen Reactions    Gluten Meal GI Intolerance    Wheat Diarrhea    Latex Rash     Tape and adhesive bandages cause rash     Milk-Related Compounds GI Intolerance       Review of Systems   Constitutional:  Negative for fever.   Musculoskeletal:  Negative for arthralgias, gait problem and joint swelling.   Skin:  Negative for color change and rash.   Neurological:  Negative for weakness.   Psychiatric/Behavioral:  Negative for confusion.        I have reviewed the medical and surgical history, family history, social history, medications, and/or allergies, and the review of systems of this report.    Objective   /86   Ht 165.1 cm (65\")   Wt 66.2 kg (146 lb)   BMI 24.30 kg/m²   Physical Exam  Musculoskeletal:      Right knee: No effusion.      Left knee: No effusion.       Right Knee Exam     Muscle Strength   The patient has normal right knee strength.    Tenderness   The patient is experiencing no tenderness.     Range of Motion   Extension:  0   Flexion:  120     Tests   Varus: negative Valgus: negative    Other   Erythema: absent  Scars: present (well healed)  Effusion: no effusion present      Left Knee Exam     Muscle Strength   The patient has normal left knee strength.    Tenderness   The patient is experiencing no tenderness.     Range of Motion   Extension:  0   Flexion:  120     Tests   Varus: negative Valgus: negative    Other   Erythema: absent  Scars: present (well healed)  Swelling: mild  Effusion: no effusion present           Neurological Exam    Assessment & Plan     Independent Review of Radiographic Studies:    AP standing of both knees and lateral of the  knee taken in the office today, indication to evaluate status of prosthesis and joint, and compared with prior imaging, shows no acute fracture or dislocation. Good " position and alignment of prosthesis with no interval change and no radiographic signs of loosening.    Laboratory and Other Studies:  No new results reviewed today.     Medical Decision Making:    No complications of procedure and treatments.  Excellent progress.  Routine activity and exercise as tolerated.  Patient has an upcoming dental appointment and given prescription for antibiotic prophylaxis.    Procedures    Diagnoses and all orders for this visit:    1. Status post total knee replacement using cement, left (Primary)  -     amoxicillin (AMOXIL) 500 MG capsule; Take 4 500mg tablets one hour prior to dental procedure  Dispense: 4 capsule; Refill: 0    2. History of total knee replacement, right       Discussion of orthopaedic goals and activities and patient expressed appreciation.  Regular exercise as tolerated.  Guided on proper techniques for mobility and conditioning exercises.  Ice, heat, and/or modalities as beneficial.  Home exercise program ongoing.  After care and dental prophylaxis for joint replacement prosthesis.    Recommendations/Plan:  Exercise, medications, injections, other patient advice, and return appointment as noted.  Brace: No brace was given at today's visit.  Referral: No referrals made at today's visit.  Test/Studies: No additional studies ordered at this time.  Work/Activity Status: Usual activities, routine exercise as tolerated, light physical work as tolerated, no strenuous activity.    Return in about 1 year (around 3/14/2026) for Annual recheck, XOA both knees.  Patient and  are encouraged and agreeable to call or return sooner for any issues or concerns.

## 2025-07-11 LAB
ALBUMIN SERPL-MCNC: 4.3 G/DL (ref 3.5–5.2)
ALBUMIN/GLOB SERPL: 2.2 G/DL
ALP SERPL-CCNC: 65 U/L (ref 39–117)
ALT SERPL-CCNC: 16 U/L (ref 1–33)
AST SERPL-CCNC: 23 U/L (ref 1–32)
BILIRUB SERPL-MCNC: 0.6 MG/DL (ref 0–1.2)
BUN SERPL-MCNC: 12 MG/DL (ref 8–23)
BUN/CREAT SERPL: 16.9 (ref 7–25)
CALCIUM SERPL-MCNC: 9.4 MG/DL (ref 8.6–10.5)
CHLORIDE SERPL-SCNC: 105 MMOL/L (ref 98–107)
CHOLEST SERPL-MCNC: 160 MG/DL (ref 0–200)
CO2 SERPL-SCNC: 24.8 MMOL/L (ref 22–29)
CREAT SERPL-MCNC: 0.71 MG/DL (ref 0.57–1)
EGFRCR SERPLBLD CKD-EPI 2021: 89.9 ML/MIN/1.73
ERYTHROCYTE [DISTWIDTH] IN BLOOD BY AUTOMATED COUNT: 13.2 % (ref 12.3–15.4)
GLOBULIN SER CALC-MCNC: 2 GM/DL
GLUCOSE SERPL-MCNC: 99 MG/DL (ref 65–99)
HBA1C MFR BLD: 5.7 % (ref 4.8–5.6)
HCT VFR BLD AUTO: 42.5 % (ref 34–46.6)
HDLC SERPL-MCNC: 51 MG/DL (ref 40–60)
HGB BLD-MCNC: 13.7 G/DL (ref 12–15.9)
LDLC SERPL CALC-MCNC: 84 MG/DL (ref 0–100)
MCH RBC QN AUTO: 28.9 PG (ref 26.6–33)
MCHC RBC AUTO-ENTMCNC: 32.2 G/DL (ref 31.5–35.7)
MCV RBC AUTO: 89.7 FL (ref 79–97)
PLATELET # BLD AUTO: 374 10*3/MM3 (ref 140–450)
POTASSIUM SERPL-SCNC: 4 MMOL/L (ref 3.5–5.2)
PROT SERPL-MCNC: 6.3 G/DL (ref 6–8.5)
RBC # BLD AUTO: 4.74 10*6/MM3 (ref 3.77–5.28)
SODIUM SERPL-SCNC: 141 MMOL/L (ref 136–145)
TRIGL SERPL-MCNC: 143 MG/DL (ref 0–150)
VLDLC SERPL CALC-MCNC: 25 MG/DL (ref 5–40)
WBC # BLD AUTO: 6.93 10*3/MM3 (ref 3.4–10.8)

## 2025-07-17 ENCOUNTER — OFFICE VISIT (OUTPATIENT)
Dept: INTERNAL MEDICINE | Facility: CLINIC | Age: 75
End: 2025-07-17
Payer: MEDICARE

## 2025-07-17 VITALS
HEART RATE: 71 BPM | BODY MASS INDEX: 24.12 KG/M2 | OXYGEN SATURATION: 99 % | RESPIRATION RATE: 18 BRPM | TEMPERATURE: 97.6 F | DIASTOLIC BLOOD PRESSURE: 71 MMHG | WEIGHT: 144.8 LBS | HEIGHT: 65 IN | SYSTOLIC BLOOD PRESSURE: 134 MMHG

## 2025-07-17 DIAGNOSIS — I10 BENIGN ESSENTIAL HYPERTENSION: Primary | ICD-10-CM

## 2025-07-17 DIAGNOSIS — R73.01 IMPAIRED FASTING GLUCOSE: ICD-10-CM

## 2025-07-17 DIAGNOSIS — Z23 NEED FOR IMMUNIZATION AGAINST VIRAL HEPATITIS: ICD-10-CM

## 2025-07-17 DIAGNOSIS — E78.2 MIXED HYPERLIPIDEMIA: ICD-10-CM

## 2025-07-17 RX ORDER — LISINOPRIL AND HYDROCHLOROTHIAZIDE 12.5; 2 MG/1; MG/1
0.5 TABLET ORAL DAILY
Qty: 45 TABLET | Refills: 2 | Status: SHIPPED | OUTPATIENT
Start: 2025-07-17

## 2025-07-17 RX ORDER — ROSUVASTATIN CALCIUM 10 MG/1
10 TABLET, COATED ORAL NIGHTLY
Qty: 90 TABLET | Refills: 1 | Status: SHIPPED | OUTPATIENT
Start: 2025-07-17

## 2025-07-17 NOTE — LETTER
[facility]  Vaccine Consent Form    Patient Name:  Sahara Hardwick  Patient :  1950     Vaccine(s) Ordered    Hepatitis B Vaccine Heplisav-B        Screening Checklist  The following questions should be completed prior to vaccination. If you answer “yes” to any question, it does not necessarily mean you should not be vaccinated. It just means we may need to clarify or ask more questions. If a question is unclear, please ask your healthcare provider to explain it.    Yes No   Any fever or moderate to severe illness today (mild illness and/or antibiotic treatment are not contraindications)?     Do you have a history of a serious reaction to any previous vaccinations, such as anaphylaxis, encephalopathy within 7 days, Guillain-[place] syndrome within 6 weeks, seizure?     Have you received any live vaccine(s) (e.g MMR, ELDA) or any other vaccines in the last month (to ensure duplicate doses aren't given)?     Do you have an anaphylactic allergy to latex (DTaP, DTaP-IPV, Hep A, Hep B, MenB, RV, Td, Tdap), baker’s yeast (Hep B, HPV), polysorbates (RSV, nirsevimab, PCV 20 and 21, Rotavirrus, Tdap, Shingrix), or gelatin (ELDA, MMR)?     Do you have an anaphylactic allergy to neomycin (Rabies, ELDA, MMR, IPV, Hep A), polymyxin B (IPV), or streptomycin (IPV)?      Any cancer, leukemia, AIDS, or other immune system disorder? (ELDA, MMR, RV)     Do you have a parent, brother, or sister with an immune system problem (if immune competence of vaccine recipient clinically verified, can proceed)? (MMR, ELDA)     Any recent steroid treatments for >2 weeks, chemotherapy, or radiation treatment? (ELDA, MMR)     Have you received antibody-containing blood transfusions or IVIG in the past 11 months (recommended interval is dependent on product)? (MMR, ELDA)     Have you taken antiviral drugs (acyclovir, famciclovir, valacyclovir for ELDA) in the last 24 or 48 hours, respectively?      Are you pregnant or planning to become pregnant  "within 1 month? (ELDA, MMR, HPV, IPV, MenB, Abrexvy; For Hep B- refer to Engerix-B; For RSV - Abrysvo is indicated for 32-36 weeks of pregnancy from September to January)     For infants, have you ever been told your child has had intussusception or a medical emergency involving obstruction of the intestine (Rotavirus)? If not for an infant, can skip this question.         *Ordering Physicians/APC should be consulted if \"yes\" is checked by the patient or guardian above.  I have received, read, and understand the Vaccine Information Statement (VIS) for each vaccine ordered.  I have considered my or my child's health status as well as the health status of my close contacts.  I have taken the opportunity to discuss my vaccine questions with my or my child's health care provider.   I have requested that the ordered vaccine(s) be given to me or my child.  I understand the benefits and risks of the vaccines.  I understand that I should remain in the clinic for 15 minutes after receiving the vaccine(s).  _________________________________________________________  Signature of Patient or Parent/Legal Guardian ____________________  Date   "

## 2025-07-17 NOTE — PROGRESS NOTES
"Chief Complaint  Hypertension and Hyperlipidemia    Subjective        Sahara Hardwick presents to [facility]  HPI: Patient is here to follow up on the blood pressure  The patient is taking the blood pressure medications as prescribed and has had no side effects. The patient is also here to follow up on the cholesterol and  had  lab work done .  The patient also needs refills on medications .   Hyperlipidemia   Pertinent negatives include no chest pain or shortness of breath.   Hypertension   Pertinent negatives include no chest pain, palpitations or shortness of breath.      Objective   Vital Signs:  /71   Pulse 71   Temp 97.6 °F (36.4 °C) (Temporal)   Resp 18   Ht 165.1 cm (65\")   Wt 65.7 kg (144 lb 12.8 oz)   SpO2 99%   BMI 24.10 kg/m²   Estimated body mass index is 24.1 kg/m² as calculated from the following:    Height as of this encounter: 165.1 cm (65\").    Weight as of this encounter: 65.7 kg (144 lb 12.8 oz).    BMI is within normal parameters. No other follow-up for BMI required.      Physical Exam  Vitals and nursing note reviewed.   Constitutional:       General: She is not in acute distress.     Appearance: Normal appearance. She is not diaphoretic.   HENT:      Head: Normocephalic and atraumatic.      Right Ear: External ear normal.      Left Ear: External ear normal.      Nose: Nose normal.   Eyes:      Extraocular Movements: Extraocular movements intact.      Conjunctiva/sclera: Conjunctivae normal.   Neck:      Trachea: Trachea normal.   Cardiovascular:      Rate and Rhythm: Normal rate and regular rhythm.      Heart sounds: Normal heart sounds.   Pulmonary:      Effort: Pulmonary effort is normal. No respiratory distress.      Breath sounds: Normal breath sounds.   Abdominal:      General: Abdomen is flat.   Musculoskeletal:      Cervical back: Neck supple.      Comments: Moves all limbs   Skin:     General: Skin is warm.   Neurological:      Mental " Status: She is alert and oriented to person, place, and time.      Comments: No gross motor or sensory deficits        Result Review :  The following data was reviewed by: Shona Horn MD on 07/17/2025:  Common labs          9/4/2024    08:05 7/10/2025    07:59   Common Labs   Glucose 104  99    BUN 14  12.0    Creatinine 0.66  0.71    Sodium 144  141    Potassium 5.0  4.0    Chloride 104  105    Calcium 10.0  9.4    Albumin 4.5  4.3    Total Bilirubin 0.5  0.6    Alkaline Phosphatase 69  65    AST (SGOT) 19  23    ALT (SGPT) 17  16    WBC 6.76  6.93    Hemoglobin 13.6  13.7    Hematocrit 43.6  42.5    Platelets 403  374    Total Cholesterol 169  160    Triglycerides 191  143    HDL Cholesterol 51  51    LDL Cholesterol  86  84    Hemoglobin A1C  5.70                Assessment and Plan   Diagnoses and all orders for this visit:    1. Benign essential hypertension (Primary)  -     lisinopril-hydrochlorothiazide (PRINZIDE,ZESTORETIC) 20-12.5 MG per tablet; Take 0.5 tablets by mouth Daily.  Dispense: 45 tablet; Refill: 2    2. Mixed hyperlipidemia  -     rosuvastatin (Crestor) 10 MG tablet; Take 1 tablet by mouth Every Night.  Dispense: 90 tablet; Refill: 1  -     CBC (No Diff)  -     Comprehensive Metabolic Panel  -     Lipid Panel    3. Impaired fasting glucose  -     Hemoglobin A1c    4. Need for immunization against viral hepatitis  -     Hepatitis B Vaccine Heplisav-B  -     Hepatitis B Vaccine Heplisav-B; Future      Plan:  1.  Benign essential hypertension: Will continue current medication, low-sodium diet advised, Counseled to regularly check BP at home with goal averaging <130/80.   2.mixed hyperlipidemia:  reviewed  fasting CMP and lipid panel.  Diet and exercise counseled,  Will continue current medications  3. impaired glucose   :   reviewed  fasting CMP  and hba1c  5.7  , diet and exercise counseled  4.  Need for Hepatitis B Vaccine :  first dose given in clinic today  and well tolerated , patient to  return to the clinic  in 4 weeks for second dose            Follow Up   Return in about 5 months (around 12/2/2025) for Medicare Wellness.  Patient was given instructions and counseling regarding her condition or for health maintenance advice. Please see specific information pulled into the AVS if appropriate.             Answers submitted by the patient for this visit:  Chronic Condition Follow-up (Submitted on 7/10/2025)  Chief Complaint: PCP follow-up  hypertension: Yes  fatigue: Yes  Medication compliance: all of the time  Treatment barriers: no complaince problems  Exercise: three times a week

## 2025-08-21 ENCOUNTER — CLINICAL SUPPORT (OUTPATIENT)
Dept: INTERNAL MEDICINE | Facility: CLINIC | Age: 75
End: 2025-08-21
Payer: MEDICARE

## 2025-08-21 DIAGNOSIS — Z23 NEED FOR IMMUNIZATION AGAINST VIRAL HEPATITIS: ICD-10-CM

## 2025-08-21 PROCEDURE — G0010 ADMIN HEPATITIS B VACCINE: HCPCS | Performed by: INTERNAL MEDICINE

## 2025-08-21 PROCEDURE — 90739 HEPB VACC 2/4 DOSE ADULT IM: CPT | Performed by: INTERNAL MEDICINE

## (undated) DEVICE — CUFF SCD HEMOFORCE SEQ CALF STD MD

## (undated) DEVICE — SYS SKIN CLS DERMABOND PRINEO W/22CM MESH TP

## (undated) DEVICE — CONMED SCOPE SAVER BITE BLOCK, 20X27 MM: Brand: SCOPE SAVER

## (undated) DEVICE — GOWN,PREVENTION PLUS,XLARGE,STERILE: Brand: MEDLINE

## (undated) DEVICE — Device

## (undated) DEVICE — BOWL AND CEMENT CARTRIDGE WITH BREAKAWAY FEMORAL NOZZLE AND MEDIUM PRESSURIZER: Brand: ACM

## (undated) DEVICE — BLD CLIP UNIV SURG GRY

## (undated) DEVICE — SUT VIC 2/0 CT1 27IN J259H

## (undated) DEVICE — PLUS HANDPIECE WITH MULTIPLE-ORIFICE TIP WITH SOFT CONE SPLASH SHIELD: Brand: SURGILAV

## (undated) DEVICE — VIOLET BRAIDED (POLYGLACTIN 910), SYNTHETIC ABSORBABLE SUTURE: Brand: COATED VICRYL

## (undated) DEVICE — SYR CT MUL PK 200ML HANDIFILL

## (undated) DEVICE — HANDPIECE SET WITH HIGH FLOW TIP AND SUCTION TUBE: Brand: INTERPULSE

## (undated) DEVICE — SHEET,DRAPE,70X100,STERILE: Brand: MEDLINE

## (undated) DEVICE — PK KN TOTL 20

## (undated) DEVICE — SINGLE-USE POLYPECTOMY SNARE: Brand: CAPTIVATOR II

## (undated) DEVICE — SOL IRR NACL 0.9PCT 3000ML

## (undated) DEVICE — GLV SURG TRIUMPH ORTHO W/ALOE PF LTX 8 STRL

## (undated) DEVICE — GLV SURG SENSICARE W/ALOE PF LF 8 STRL

## (undated) DEVICE — QUICK CATCH IN-LINE SUCTION POLYP TRAP IS USED FOR SUCTION RETRIEVAL OF ENDOSCOPICALLY REMOVED POLYPS.

## (undated) DEVICE — SPNG GZ WOVN 4X4IN 12PLY 10/BX STRL

## (undated) DEVICE — 2108 SERIES SAGITTAL BLADE, NO OFFSET  (24.8 X 1.24 X 80.1MM)

## (undated) DEVICE — DISPOSABLE TOURNIQUET CUFF SINGLE BLADDER, SINGLE PORT AND QUICK CONNECT CONNECTOR: Brand: COLOR CUFF

## (undated) DEVICE — BLADE,CLIPPER,UNIVERSAL,GRAY: Brand: MEDLINE

## (undated) DEVICE — DEV TISS CONTRL SUT STRATAFIX SPIRAL PGAPCL 3/0 PSL 60CM

## (undated) DEVICE — VLV SXN AIR/H2O ORCAPOD3 1P/U STRL

## (undated) DEVICE — FLEXIBLE YANKAUER,MEDIUM TIP, NO VACUUM CONTROL: Brand: ARGYLE

## (undated) DEVICE — ENDOSCOPY PORT CONNECTOR FOR OLYMPUS® SCOPES: Brand: ERBE

## (undated) DEVICE — SPNG LAP 18X18IN LF STRL PK/5

## (undated) DEVICE — GLOVE,SURG,SENSICARE SLT,LF,PF,8: Brand: MEDLINE

## (undated) DEVICE — HYBRID TUBING/CAP SET FOR OLYMPUS® SCOPES: Brand: ERBE

## (undated) DEVICE — DRSNG WND BORDR/ADHS NONADHR/GZ LF 4X10IN STRL

## (undated) DEVICE — LUBE JELLY PK/2.75GM STRL BX/144

## (undated) DEVICE — WRAP KNEE COLD THERAPY

## (undated) DEVICE — FRCP BX RADJAW4 NDL 2.8 240 STD OG

## (undated) DEVICE — SLV SCD CALF HEMOFORCE DVT THERP REPROC MD

## (undated) DEVICE — NEEDLE, QUINCKE 22GX3.5": Brand: MEDLINE INDUSTRIES, INC.

## (undated) DEVICE — DRP SURG U/DRP INVISISHIELD PA 48X52IN

## (undated) DEVICE — SYR LUERLOK 30CC

## (undated) DEVICE — DRSNG SURG AQUACEL AG 9X25CM